# Patient Record
Sex: MALE | Race: WHITE | Employment: OTHER | URBAN - METROPOLITAN AREA
[De-identification: names, ages, dates, MRNs, and addresses within clinical notes are randomized per-mention and may not be internally consistent; named-entity substitution may affect disease eponyms.]

---

## 2017-06-02 ENCOUNTER — TRANSFERRED RECORDS (OUTPATIENT)
Dept: HEALTH INFORMATION MANAGEMENT | Facility: CLINIC | Age: 57
End: 2017-06-02

## 2017-10-30 ENCOUNTER — TRANSFERRED RECORDS (OUTPATIENT)
Dept: HEALTH INFORMATION MANAGEMENT | Facility: CLINIC | Age: 57
End: 2017-10-30

## 2017-11-29 ENCOUNTER — TRANSFERRED RECORDS (OUTPATIENT)
Dept: HEALTH INFORMATION MANAGEMENT | Facility: CLINIC | Age: 57
End: 2017-11-29

## 2017-12-05 ENCOUNTER — TRANSFERRED RECORDS (OUTPATIENT)
Dept: HEALTH INFORMATION MANAGEMENT | Facility: CLINIC | Age: 57
End: 2017-12-05

## 2018-01-30 ENCOUNTER — TRANSFERRED RECORDS (OUTPATIENT)
Dept: HEALTH INFORMATION MANAGEMENT | Facility: CLINIC | Age: 58
End: 2018-01-30

## 2018-02-27 ENCOUNTER — TRANSFERRED RECORDS (OUTPATIENT)
Dept: HEALTH INFORMATION MANAGEMENT | Facility: CLINIC | Age: 58
End: 2018-02-27

## 2018-02-28 ENCOUNTER — TRANSFERRED RECORDS (OUTPATIENT)
Dept: HEALTH INFORMATION MANAGEMENT | Facility: CLINIC | Age: 58
End: 2018-02-28

## 2018-03-12 ENCOUNTER — TRANSFERRED RECORDS (OUTPATIENT)
Dept: HEALTH INFORMATION MANAGEMENT | Facility: CLINIC | Age: 58
End: 2018-03-12

## 2018-03-22 ENCOUNTER — TRANSFERRED RECORDS (OUTPATIENT)
Dept: HEALTH INFORMATION MANAGEMENT | Facility: CLINIC | Age: 58
End: 2018-03-22

## 2018-04-23 ENCOUNTER — TRANSFERRED RECORDS (OUTPATIENT)
Dept: HEALTH INFORMATION MANAGEMENT | Facility: CLINIC | Age: 58
End: 2018-04-23

## 2018-05-18 ENCOUNTER — TRANSFERRED RECORDS (OUTPATIENT)
Dept: HEALTH INFORMATION MANAGEMENT | Facility: CLINIC | Age: 58
End: 2018-05-18

## 2018-05-21 ENCOUNTER — TRANSFERRED RECORDS (OUTPATIENT)
Dept: HEALTH INFORMATION MANAGEMENT | Facility: CLINIC | Age: 58
End: 2018-05-21

## 2018-05-23 ENCOUNTER — TRANSFERRED RECORDS (OUTPATIENT)
Dept: HEALTH INFORMATION MANAGEMENT | Facility: CLINIC | Age: 58
End: 2018-05-23

## 2018-08-03 ENCOUNTER — TRANSFERRED RECORDS (OUTPATIENT)
Dept: HEALTH INFORMATION MANAGEMENT | Facility: CLINIC | Age: 58
End: 2018-08-03

## 2018-08-10 ENCOUNTER — TRANSFERRED RECORDS (OUTPATIENT)
Dept: HEALTH INFORMATION MANAGEMENT | Facility: CLINIC | Age: 58
End: 2018-08-10

## 2018-08-16 ENCOUNTER — TRANSFERRED RECORDS (OUTPATIENT)
Dept: HEALTH INFORMATION MANAGEMENT | Facility: CLINIC | Age: 58
End: 2018-08-16

## 2018-08-17 ENCOUNTER — TRANSFERRED RECORDS (OUTPATIENT)
Dept: HEALTH INFORMATION MANAGEMENT | Facility: CLINIC | Age: 58
End: 2018-08-17

## 2018-08-20 ENCOUNTER — TRANSFERRED RECORDS (OUTPATIENT)
Dept: HEALTH INFORMATION MANAGEMENT | Facility: CLINIC | Age: 58
End: 2018-08-20

## 2018-08-22 ENCOUNTER — TRANSFERRED RECORDS (OUTPATIENT)
Dept: HEALTH INFORMATION MANAGEMENT | Facility: CLINIC | Age: 58
End: 2018-08-22

## 2018-08-28 ENCOUNTER — PRE VISIT (OUTPATIENT)
Dept: ORTHOPEDICS | Facility: CLINIC | Age: 58
End: 2018-08-28

## 2018-08-28 NOTE — TELEPHONE ENCOUNTER
RECORDS RECEIVED FROM:[facility]   DATE RECEIVED: 08/29/2018   NOTES STATUS DETAILS   OFFICE NOTE from referring provider Received    OFFICE NOTE from other specialist Received    DISCHARGE SUMMARY from hospital N/A    DISCHARGE REPORT from the ER N/A    OPERATIVE REPORT Received    MEDICATION LIST Received    IMPLANT RECORD/STICKER N/A    LABS     CBC/DIFF Received    CULTURES N/A    INJECTIONS DONE IN RADIOLOGY Received    MRI Received    CT SCAN received    XRAYS (IMAGES & REPORTS) N/A    TUMOR     PATHOLOGY  Slides & report N/A

## 2018-10-11 DIAGNOSIS — M54.6 PAIN IN THORACIC SPINE: Primary | ICD-10-CM

## 2018-10-12 ENCOUNTER — RADIANT APPOINTMENT (OUTPATIENT)
Dept: GENERAL RADIOLOGY | Facility: CLINIC | Age: 58
End: 2018-10-12
Attending: ORTHOPAEDIC SURGERY
Payer: OTHER MISCELLANEOUS

## 2018-10-12 ENCOUNTER — OFFICE VISIT (OUTPATIENT)
Dept: ORTHOPEDICS | Facility: CLINIC | Age: 58
End: 2018-10-12
Payer: OTHER MISCELLANEOUS

## 2018-10-12 VITALS — WEIGHT: 275.2 LBS | HEIGHT: 70 IN | BODY MASS INDEX: 39.4 KG/M2

## 2018-10-12 DIAGNOSIS — Z13.228 SCREENING FOR ENDOCRINE, NUTRITIONAL, METABOLIC AND IMMUNITY DISORDER: ICD-10-CM

## 2018-10-12 DIAGNOSIS — E66.01 CLASS 3 SEVERE OBESITY DUE TO EXCESS CALORIES WITH SERIOUS COMORBIDITY AND BODY MASS INDEX (BMI) OF 40.0 TO 44.9 IN ADULT (H): ICD-10-CM

## 2018-10-12 DIAGNOSIS — Z13.29 SCREENING FOR ENDOCRINE, NUTRITIONAL, METABOLIC AND IMMUNITY DISORDER: ICD-10-CM

## 2018-10-12 DIAGNOSIS — Z13.21 SCREENING FOR ENDOCRINE, NUTRITIONAL, METABOLIC AND IMMUNITY DISORDER: ICD-10-CM

## 2018-10-12 DIAGNOSIS — E66.813 CLASS 3 SEVERE OBESITY DUE TO EXCESS CALORIES WITH SERIOUS COMORBIDITY AND BODY MASS INDEX (BMI) OF 40.0 TO 44.9 IN ADULT (H): ICD-10-CM

## 2018-10-12 DIAGNOSIS — M54.16 LUMBAR RADICULOPATHY: ICD-10-CM

## 2018-10-12 DIAGNOSIS — Z13.0 SCREENING FOR ENDOCRINE, NUTRITIONAL, METABOLIC AND IMMUNITY DISORDER: ICD-10-CM

## 2018-10-12 DIAGNOSIS — M54.6 PAIN IN THORACIC SPINE: ICD-10-CM

## 2018-10-12 DIAGNOSIS — M85.9 LOW BONE DENSITY: Primary | ICD-10-CM

## 2018-10-12 LAB
ALBUMIN SERPL-MCNC: 3.4 G/DL (ref 3.4–5)
HBA1C MFR BLD: 6.2 % (ref 0–5.6)
MRSA DNA SPEC QL NAA+PROBE: NEGATIVE
SPECIMEN SOURCE: NORMAL

## 2018-10-12 RX ORDER — GABAPENTIN 300 MG/1
800 TABLET, FILM COATED ORAL 4 TIMES DAILY
COMMUNITY
End: 2019-01-11

## 2018-10-12 RX ORDER — AMOXICILLIN 250 MG
1 CAPSULE ORAL 4 TIMES DAILY PRN
COMMUNITY

## 2018-10-12 RX ORDER — PROMETHAZINE HYDROCHLORIDE 25 MG/1
25 TABLET ORAL EVERY MORNING
COMMUNITY

## 2018-10-12 ASSESSMENT — ENCOUNTER SYMPTOMS
POLYDIPSIA: 1
JAUNDICE: 0
DOUBLE VISION: 0
HEARTBURN: 1
BLOATING: 0
EYE PAIN: 0
NAUSEA: 1
FATIGUE: 1
MUSCLE WEAKNESS: 1
MYALGIAS: 1
LOSS OF CONSCIOUSNESS: 0
CHILLS: 0
ABDOMINAL PAIN: 0
CONSTIPATION: 0
PANIC: 0
ALTERED TEMPERATURE REGULATION: 1
INCREASED ENERGY: 1
EYE IRRITATION: 0
WEIGHT GAIN: 1
EYE REDNESS: 0
SEIZURES: 0
MEMORY LOSS: 0
DECREASED CONCENTRATION: 1
POLYPHAGIA: 1
SPEECH CHANGE: 0
TINGLING: 1
ARTHRALGIAS: 0
DISTURBANCES IN COORDINATION: 0
DIZZINESS: 0
BACK PAIN: 1
INSOMNIA: 1
NERVOUS/ANXIOUS: 1
DECREASED APPETITE: 0
HALLUCINATIONS: 0
PARALYSIS: 0
DIARRHEA: 1
HEADACHES: 1
DEPRESSION: 1
NIGHT SWEATS: 0
NUMBNESS: 1
JOINT SWELLING: 0
FEVER: 0
VOMITING: 0
BOWEL INCONTINENCE: 0
NECK PAIN: 0
TREMORS: 0
BLOOD IN STOOL: 0
WEAKNESS: 1
RECTAL PAIN: 0
MUSCLE CRAMPS: 1
STIFFNESS: 1
WEIGHT LOSS: 0

## 2018-10-12 ASSESSMENT — PATIENT HEALTH QUESTIONNAIRE - PHQ9
SUM OF ALL RESPONSES TO PHQ QUESTIONS 1-9: 7
10. IF YOU CHECKED OFF ANY PROBLEMS, HOW DIFFICULT HAVE THESE PROBLEMS MADE IT FOR YOU TO DO YOUR WORK, TAKE CARE OF THINGS AT HOME, OR GET ALONG WITH OTHER PEOPLE: NOT DIFFICULT AT ALL
SUM OF ALL RESPONSES TO PHQ QUESTIONS 1-9: 7

## 2018-10-12 NOTE — NURSING NOTE
"Reason For Visit:   Chief Complaint   Patient presents with     Consult     talk about thoracic spine revision, patient stated there is a screw stuck in the nerve.        Primary MD: Sarah Daly  Ref. MD: Self    ?  No  Occupation disability.  Currently working? No.  Work status?  On disability.  Date of injury: may 1999  Type of injury: acute bent over picking up a case of bleach   Date of surgery: February 27 2018,   Type of surgery: L3- T10. Then screw removal recently   Smoker: Yes  Request smoking cessation information: No    Ht 1.765 m (5' 9.5\")  Wt 124.8 kg (275 lb 3.2 oz)  BMI 40.06 kg/m2    Pain Assessment  Patient Currently in Pain: Yes  0-10 Pain Scale: 9  Primary Pain Location: Back  Pain Orientation: Lower, Right  Pain Descriptors: Throbbing, Aching  Aggravating Factors: Stairs (going to the restroom, twisting)    Oswestry (ANNITA) Questionnaire    OSWESTRY DISABILITY INDEX 10/12/2018   Count 9   Sum 16   Oswestry Score (%) 35.56   Some recent data might be hidden            Neck Disability Index (NDI) Questionnaire    No flowsheet data found.           Visual Analog Pain Scale  Back Pain Scale 0-10: 7.5  Right leg pain: 6  Left leg pain: 3    Promis 10 Assessment    PROMIS 10 10/12/2018   In general, would you say your health is: Good   In general, would you say your quality of life is: Good   In general, how would you rate your physical health? Good   In general, how would you rate your mental health, including your mood and your ability to think? Good   In general, how would you rate your satisfaction with your social activities and relationships? Fair   In general, please rate how well you carry out your usual social activities and roles Good   To what extent are you able to carry out your everyday physical activities such as walking, climbing stairs, carrying groceries, or moving a chair? Moderately   How often have you been bothered by emotional problems such as feeling anxious, " depressed or irritable? Sometimes   How would you rate your fatigue on average? Moderate   How would you rate your pain on average?   0 = No Pain  to  10 = Worst Imaginable Pain 6   In general, would you say your health is: 3   In general, would you say your quality of life is: 3   In general, how would you rate your physical health? 3   In general, how would you rate your mental health, including your mood and your ability to think? 3   In general, how would you rate your satisfaction with your social activities and relationships? 2   In general, please rate how well you carry out your usual social activities and roles. (This includes activities at home, at work and in your community, and responsibilities as a parent, child, spouse, employee, friend, etc.) 3   To what extent are you able to carry out your everyday physical activities such as walking, climbing stairs, carrying groceries, or moving a chair? 3   In the past 7 days, how often have you been bothered by emotional problems such as feeling anxious, depressed, or irritable? 3   In the past 7 days, how would you rate your fatigue on average? 3   In the past 7 days, how would you rate your pain on average, where 0 means no pain, and 10 means worst imaginable pain? 6   Global Mental Health Score 11   Global Physical Health Score 12   PROMIS TOTAL - SUBSCORES 23   Some recent data might be hidden                Kali Ortiz ATC

## 2018-10-12 NOTE — NURSING NOTE
John lives in [facility], and the following referrals were printed out for him to do locally: CT myelograms Thoracic and Lumbar, Dexa, P.T./Pool therapy, Lumbar BRITTNY, Nutritionist.  He will get those done, hand carry a disk of images as backup, and return to see Dr Sebastian after the above same day as PAC for anesthesia workup.  MRSA screen swab was sent to lab, and pt will have endocrine screen labs drawn today.  Pt presently takes 5000u Vit D/day.  Namita Flowers RN

## 2018-10-12 NOTE — LETTER
10/12/2018       RE: John Olvera  [address] [address]     Dear Colleague,    Thank you for referring your patient, John Olvera, to [facility]. Please see a copy of my visit note below.    Spine Surgery Consultation    REFERRING PHYSICIAN: Justin Cisse   PRIMARY CARE PHYSICIAN: Sarah Daly           Chief Complaint:   Consult (talk about thoracic spine revision, patient stated there is a screw stuck in the nerve. )      History of Present Illness:  Symptom Profile Including: location of symptoms, onset, severity, exacerbating/alleviating factors, previous treatments:        John Olvera is a 58 year old male who is referred to me by Dr. Zuhair Cisse in [place] for evaluation of lumbar flat back with a very complicated past procedure.  He has had multiple previous lumbar operations.  Originally an L3 to the S1 level which was subsequently complicated by adjacent segment fracture which was treated with vertebroplasty.  He went on to have continuing pain and subsequently then in February 2018 had an extension of the previous fusion up to the T10 level.  Unfortunately this most recent procedure was complicated in multiple ways.  He did develop a deep infection and had 6 weeks of IV antibiotics.  Furthermore he had missed placed screws and he did have to return to the operating room for screw removal and ever since that time he has had severe right thoracic rib cage radicular complaints.    On interview today he is bothered by 2 things.  First the right thoracic rib cage pain is never gotten better.  He says it was present as soon as he woke up after the February surgery.  In addition he cannot stand up straight.  He feels like he bends forward when he walks.  He has severe low back pain from this.  He denies any radicular complaints into the legs.    He is tried multiple oral medications including gabapentin anti-inflammatories  "and is been doing some physical therapy.  He thinks he is gained 30-40 pounds since the surgery because of inactivity.  He has started smoking again.         Past Medical History:   No past medical history on file.         Past Surgical History:   No past surgical history on file.         Social History:     Social History   Substance Use Topics     Smoking status: Not on file     Smokeless tobacco: Not on file     Alcohol use Not on file            Family History:   No family history on file.         Allergies:   Not on File         Medications:     Current Outpatient Prescriptions   Medication     tiZANidine (ZANAFLEX) 4 MG tablet     No current facility-administered medications for this visit.              Review of Systems:     A 10 point ROS was performed and reviewed. Specific responses to these questions are noted at the end of the document.         Physical Exam:   Vitals: Ht 1.765 m (5' 9.5\")  Wt 124.8 kg (275 lb 3.2 oz)  BMI 40.06 kg/m2  Constitutional: awake, alert, cooperative, no apparent distress, appears stated age.    Eyes: The sclera are white.  Ears, Nose, Throat: The trachea is midline.  Psychiatric: The patient has a normal affect.  Respiratory: breathing non-labored  Cardiovascular: The extremities are warm and perfused.  Skin: no obvious rashes or lesions.  Musculoskeletal, Neurologic, and Spine:      Lumbar Spine:    Appearance - No gross stepoffs or deformities    Motor -     L2-3: Hip flexion 5/5 L and 5/5 R strength          L3/4:  Knee extension L 5/5 and R 5/5 strength         L4/5:  Foot dorsiflexion R 5/5 L 5/5 and       EHL dorsiflexion R 4/5 L 4/5 strength         S1:  Plantarflexion/Peroneal Muscles  L 5/5 and R 5/5 strength    Sensation: intact to light touch L3-S1 distribution BLE      Neurologic:      REFLEXES Left Right                  Patella 1+ 1+   Ankle jerk 1+ 1+   Babinski No upgoing great toe No upgoing great toe   Clonus 0 beats 0 beats     Hip Exam:  No pain with hip " log roll and no tenderness over the greater trochanters.    Alignment:  Patient stands with a neutral standing sagittal balance.           Imaging:   We ordered and independently reviewed new radiographs at this clinic visit. The results were discussed with the patient.  Findings include:    October 12, 2018 AP lateral scoliosis radiographs show evidence of previous extensive thoracolumbar fusion from T10-S1 with a multi-luis angel construct and evidence of fracture at L2 with cement augmentation.  Severe sagittal imbalance with 16 cm positive based on the C7 soraya line.  Lumbar lordosis measures 26 degrees, pelvic incidence measures 40 degrees.  Thoracic kyphosis measures 54 degrees.    August 9, 2018 thoracic level MRI shows no obvious severe stenosis.    August 9, 2018 thoracic and lumbar CT scans: Evidence of previous medially placed screws which are subsequently removed on the right at T12 and L2.  There is also a medial breach at L1 with residual screw at that level which was not removed.  Solid-appearing fusion from L3-S1 but indeterminant fusion above that.         Assessment and Plan:   Assessment:  58 year old male with a right medial screw breach at L1 as well as lumbar flat back after multiply complicated previous lumbar surgeries.     Plan:  1. I do not know for sure if the right L1 screw is the cause of his continued right-sided pain.  To help sort this out I would like him to get an L1 to transforaminal epidural steroid injection.  2. I am also not sure if he could tolerate a major thoracolumbar reconstruction at this point.  If we were to address his problems he would likely need a pedicle subtraction osteotomy.  Certainly it would be very high risk in him.  I think he needs to quit smoking and lose weight before he would be a candidate.  I am going to order a bone density study and anesthesia screening visit for him.  I also sent him for aquatic-based physical therapy.  3. Follow-up with me in 6-8 weeks so  I can assess his response to the injection and follow his progress with weight loss and therapy as well as smoking cessation.      Again, thank you for allowing me to participate in the care of your patient.      Sincerely,    Salazar Sebastian MD

## 2018-10-12 NOTE — PROGRESS NOTES
Spine Surgery Consultation    REFERRING PHYSICIAN: Justin Cisse   PRIMARY CARE PHYSICIAN: Sarah Daly           Chief Complaint:   Consult (talk about thoracic spine revision, patient stated there is a screw stuck in the nerve. )      History of Present Illness:  Symptom Profile Including: location of symptoms, onset, severity, exacerbating/alleviating factors, previous treatments:        John Olvera is a 58 year old male who is referred to me by Dr. Zuhair Cisse in [place] for evaluation of lumbar flat back with a very complicated past procedure.  He has had multiple previous lumbar operations.  Originally an L3 to the S1 level which was subsequently complicated by adjacent segment fracture which was treated with vertebroplasty.  He went on to have continuing pain and subsequently then in February 2018 had an extension of the previous fusion up to the T10 level.  Unfortunately this most recent procedure was complicated in multiple ways.  He did develop a deep infection and had 6 weeks of IV antibiotics.  Furthermore he had missed placed screws and he did have to return to the operating room for screw removal and ever since that time he has had severe right thoracic rib cage radicular complaints.    On interview today he is bothered by 2 things.  First the right thoracic rib cage pain is never gotten better.  He says it was present as soon as he woke up after the February surgery.  In addition he cannot stand up straight.  He feels like he bends forward when he walks.  He has severe low back pain from this.  He denies any radicular complaints into the legs.    He is tried multiple oral medications including gabapentin anti-inflammatories and is been doing some physical therapy.  He thinks he is gained 30-40 pounds since the surgery because of inactivity.  He has started smoking again.         Past Medical History:   No past medical history on file.         Past Surgical History:   No past surgical history  "on file.         Social History:     Social History   Substance Use Topics     Smoking status: Not on file     Smokeless tobacco: Not on file     Alcohol use Not on file            Family History:   No family history on file.         Allergies:   Not on File         Medications:     Current Outpatient Prescriptions   Medication     tiZANidine (ZANAFLEX) 4 MG tablet     No current facility-administered medications for this visit.              Review of Systems:     A 10 point ROS was performed and reviewed. Specific responses to these questions are noted at the end of the document.         Physical Exam:   Vitals: Ht 1.765 m (5' 9.5\")  Wt 124.8 kg (275 lb 3.2 oz)  BMI 40.06 kg/m2  Constitutional: awake, alert, cooperative, no apparent distress, appears stated age.    Eyes: The sclera are white.  Ears, Nose, Throat: The trachea is midline.  Psychiatric: The patient has a normal affect.  Respiratory: breathing non-labored  Cardiovascular: The extremities are warm and perfused.  Skin: no obvious rashes or lesions.  Musculoskeletal, Neurologic, and Spine:      Lumbar Spine:    Appearance - No gross stepoffs or deformities    Motor -     L2-3: Hip flexion 5/5 L and 5/5 R strength          L3/4:  Knee extension L 5/5 and R 5/5 strength         L4/5:  Foot dorsiflexion R 5/5 L 5/5 and       EHL dorsiflexion R 4/5 L 4/5 strength         S1:  Plantarflexion/Peroneal Muscles  L 5/5 and R 5/5 strength    Sensation: intact to light touch L3-S1 distribution BLE      Neurologic:      REFLEXES Left Right                  Patella 1+ 1+   Ankle jerk 1+ 1+   Babinski No upgoing great toe No upgoing great toe   Clonus 0 beats 0 beats     Hip Exam:  No pain with hip log roll and no tenderness over the greater trochanters.    Alignment:  Patient stands with a neutral standing sagittal balance.           Imaging:   We ordered and independently reviewed new radiographs at this clinic visit. The results were discussed with the patient.  " Findings include:    October 12, 2018 AP lateral scoliosis radiographs show evidence of previous extensive thoracolumbar fusion from T10-S1 with a multi-luis angel construct and evidence of fracture at L2 with cement augmentation.  Severe sagittal imbalance with 16 cm positive based on the C7 soraya line.  Lumbar lordosis measures 26 degrees, pelvic incidence measures 40 degrees.  Thoracic kyphosis measures 54 degrees.    August 9, 2018 thoracic level MRI shows no obvious severe stenosis.    August 9, 2018 thoracic and lumbar CT scans: Evidence of previous medially placed screws which are subsequently removed on the right at T12 and L2.  There is also a medial breach at L1 with residual screw at that level which was not removed.  Solid-appearing fusion from L3-S1 but indeterminant fusion above that.         Assessment and Plan:   Assessment:  58 year old male with a right medial screw breach at L1 as well as lumbar flat back after multiply complicated previous lumbar surgeries.     Plan:  1. I do not know for sure if the right L1 screw is the cause of his continued right-sided pain.  To help sort this out I would like him to get an L1 to transforaminal epidural steroid injection.  2. I am also not sure if he could tolerate a major thoracolumbar reconstruction at this point.  If we were to address his problems he would likely need a pedicle subtraction osteotomy.  Certainly it would be very high risk in him.  I think he needs to quit smoking and lose weight before he would be a candidate.  I am going to order a bone density study and anesthesia screening visit for him.  I also sent him for aquatic-based physical therapy.  3. Follow-up with me in 6-8 weeks so I can assess his response to the injection and follow his progress with weight loss and therapy as well as smoking cessation.      Respectfully,  Salazar Sebastian MD  Spine Surgery  [facility]      Answers for HPI/ROS submitted by the patient on  10/12/2018   General Symptoms: Yes  Skin Symptoms: No  HENT Symptoms: No  EYE SYMPTOMS: Yes  HEART SYMPTOMS: No  LUNG SYMPTOMS: No  INTESTINAL SYMPTOMS: Yes  URINARY SYMPTOMS: No  REPRODUCTIVE SYMPTOMS: No  SKELETAL SYMPTOMS: Yes  BLOOD SYMPTOMS: No  NERVOUS SYSTEM SYMPTOMS: Yes  MENTAL HEALTH SYMPTOMS: Yes  Fever: No  Loss of appetite: No  Weight loss: No  Weight gain: Yes  Fatigue: Yes  Night sweats: No  Chills: No  Increased stress: Yes  Excessive hunger: Yes  Excessive thirst: Yes  Feeling hot or cold when others believe the temperature is normal: Yes  Loss of height: No  Post-operative complications: No  Surgical site pain: Yes  Hallucinations: No  Change in or Loss of Energy: Yes  Hyperactivity: No  Confusion: No  Eye pain: No  Vision loss: Yes  Dry eyes: No  Eye bulging: No  Double vision: No  Flashing of lights: No  Spots: No  Floaters: No  Redness: No  Crossed eyes: No  Tunnel Vision: No  Yellowing of eyes: No  Eye irritation: No  Heart burn or indigestion: Yes  Nausea: Yes  Vomiting: No  Abdominal pain: No  Bloating: No  Constipation: No  Diarrhea: Yes  Blood in stool: No  Black stools: No  Rectal or Anal pain: No  Fecal incontinence: No  Yellowing of skin or eyes: No  Vomit with blood: No  Change in stools: No  Back pain: Yes  Muscle aches: Yes  Neck pain: No  Swollen joints: No  Joint pain: No  Bone pain: No  Muscle cramps: Yes  Muscle weakness: Yes  Joint stiffness: Yes  Bone fracture: No  Trouble with coordination: No  Dizziness or trouble with balance: No  Fainting or black-out spells: No  Memory loss: No  Headache: Yes  Seizures: No  Speech problems: No  Tingling: Yes  Tremor: No  Weakness: Yes  Difficulty walking: Yes  Paralysis: No  Numbness: Yes  Nervous or Anxious: Yes  Depression: Yes  Trouble sleeping: Yes  Trouble thinking or concentrating: Yes  Mood changes: Yes  Panic attacks: No  PHQ-2 Score: 4  If you checked off any problems, how difficult have these problems made it for you to do your  work, take care of things at home, or get along with other people?: Not difficult at all  PHQ9 TOTAL SCORE: 7

## 2018-10-12 NOTE — MR AVS SNAPSHOT
After Visit Summary   10/12/2018    John Olvera    MRN: [id number]           Patient Information     Date Of Birth          1960        Visit Information        Provider Department      10/12/2018 11:20 AM Salazar Sebastian [facility]        Today's Diagnoses     Low bone density    -  1    Screening for endocrine, nutritional, metabolic and immunity disorder        Lumbar radiculopathy        Class 3 severe obesity due to excess calories with serious comorbidity and body mass index (BMI) of 40.0 to 44.9 in adult (H)           Follow-ups after your visit        Additional Services     NUTRITION REFERRAL       Your provider has referred you to: OTHER PROVIDERS: Weight loss management with nutritionist    Please be aware that coverage of these services is subject to the terms and limitations of your health insurance plan.  Call member services at your health plan with any benefit or coverage questions.      Please bring the following with you to your appointment:    (1) This referral request  (2) Any documents given to you regarding this referral  (3) Any specific questions you have about diet and/or food choices            PAC Visit Referral (For [facility] Only)       Does this visit require an Anesthesia consult?  Yes - Evaluate for medical necessity related to one of the following conditions:  Morbid Obesity and opioid tolerance    H&P done by:  N/A      Please be aware that coverage of these services is subject to the terms and limitations of your health insurance plan.  Call member services at your health plan with any benefit or coverage questions.      Please bring the following to your appointment:  >>   Any x-rays, CTs or MRIs which have been performed.  Contact the facility where they were done to arrange for  prior to your scheduled appointment.  Any new CT, MRI or other procedures ordered by your specialist must be performed at a [facility] or  coordinated by your clinic's referral office.    >>   List of current medications  >>   This referral request   >>   Any documents/labs given to you for this referral            PHYSICAL THERAPY REFERRAL (External-Prints)       Physical Therapy Referral                  Future tests that were ordered for you today     Open Future Orders        Priority Expected Expires Ordered    X-ray Transforaminal lumbar single inj Routine 10/12/2018 10/12/2019 10/12/2018    PHYSICAL THERAPY REFERRAL (External-Prints) Routine  10/12/2019 10/12/2018    XR Myelogram Thoracic Spine Routine 10/12/2018 10/12/2019 10/12/2018    XR Myelography Lumbar Spine Routine 10/12/2018 10/12/2019 10/12/2018    CT Thoracic spine w/o contrast Routine  10/12/2019 10/12/2018    CT lumbar spine without contrast Routine  10/12/2019 10/12/2018    DX Hip/Pelvis/Spine Routine  10/12/2019 10/12/2018            Who to contact     Please call your clinic at [phone] to:    Ask questions about your health    Make or cancel appointments    Discuss your medicines    Learn about your test results    Speak to your doctor            Additional Information About Your Visit        [employer] Information     [employer] gives you secure access to your electronic health record. If you see a primary care provider, you can also send messages to your care team and make appointments. If you have questions, please call your primary care clinic.  If you do not have a primary care provider, please call [phone] and they will assist you.      [employer] is an electronic gateway that provides easy, online access to your medical records. With [employer], you can request a clinic appointment, read your test results, renew a prescription or communicate with your care team.     To access your existing account, please contact your [facility] or call [phone] for assistance.        Care EveryWhere ID     This is your Care EveryWhere ID. This could be  "used by other organizations to access your [facility] medical records  [id number]        Your Vitals Were     Height BMI (Body Mass Index)                1.765 m (5' 9.5\") 40.06 kg/m2           Blood Pressure from Last 3 Encounters:   No data found for BP    Weight from Last 3 Encounters:   10/12/18 124.8 kg (275 lb 3.2 oz)              We Performed the Following     NUTRITION REFERRAL     PAC Visit Referral (For [facility] Only)       Information about OPIOIDS     PRESCRIPTION OPIOIDS: WHAT YOU NEED TO KNOW   We gave you an opioid (narcotic) pain medicine. It is important to manage your pain, but opioids are not always the best choice. You should first try all the other options your care team gave you. Take this medicine for as short a time (and as few doses) as possible.    Some activities can increase your pain, such as bandage changes or therapy sessions. It may help to take your pain medicine 30 to 60 minutes before these activities. Reduce your stress by getting enough sleep, working on hobbies you enjoy and practicing relaxation or meditation. Talk to your care team about ways to manage your pain beyond prescription opioids.    These medicines have risks:    DO NOT drive when on new or higher doses of pain medicine. These medicines can affect your alertness and reaction times, and you could be arrested for driving under the influence (DUI). If you need to use opioids long-term, talk to your care team about driving.    DO NOT operate heavy machinery    DO NOT do any other dangerous activities while taking these medicines.    DO NOT drink any alcohol while taking these medicines.     If the opioid prescribed includes acetaminophen, DO NOT take with any other medicines that contain acetaminophen. Read all labels carefully. Look for the word  acetaminophen  or  Tylenol.  Ask your pharmacist if you have questions or are unsure.    You can get addicted to pain medicines, especially if you have a history of addiction " (chemical, alcohol or substance dependence). Talk to your care team about ways to reduce this risk.    All opioids tend to cause constipation. Drink plenty of water and eat foods that have a lot of fiber, such as fruits, vegetables, prune juice, apple juice and high-fiber cereal. Take a laxative (Miralax, milk of magnesia, Colace, Senna) if you don t move your bowels at least every other day. Other side effects include upset stomach, sleepiness, dizziness, throwing up, tolerance (needing more of the medicine to have the same effect), physical dependence and slowed breathing.    Store your pills in a secure place, locked if possible. We will not replace any lost or stolen medicine. If you don t finish your medicine, please throw away (dispose) as directed by your pharmacist. The [place] [employer]ution [employer] has more information about safe disposal: https://www.pca.[facility].[place].us/living-green/managing-unwanted-medications         Primary Care Provider Office Phone # Fax #    Sarah Daly [phone]       Aayush Guzmán [facility] [address] [place] [address]        Equal Access to Services     [facility]: Hadii keyon ku hadasho Soomaali, waaxda luqadaha, qaybta kaalmada adelulyada, kristopher méndez . So [facility] [phone].    ATENCIÓN: Si habla español, tiene a greer disposición servicios gratuitos de asistencia lingüística. Arlen al [phone].    We comply with applicable federal civil rights laws and [place] laws. We do not discriminate on the basis of race, color, national origin, age, disability, sex, sexual orientation, or gender identity.            Thank you!     Thank you for choosing [facility]  for your care. Our goal is always to provide you with excellent care. Hearing back from our patients is one way we can continue to improve our services. Please take a few minutes to complete the written survey that you may receive in the mail  after your visit with us. Thank you!             Your Updated Medication List - Protect others around you: Learn how to safely use, store and throw away your medicines at www.disposemymeds.org.          This list is accurate as of 10/12/18  1:53 PM.  Always use your most recent med list.                   Brand Name Dispense Instructions for use Diagnosis    * AMBIEN PO           * AMBIEN CR PO      Take 12.5 mg by mouth At Bedtime        BUSPAR PO      Take 15 mg by mouth daily (with dinner)        DILAUDID PO      Take 8 mg by mouth 4 times daily        gabapentin 300 MG 24 hr tablet    GRALISE     Take 800 mg by mouth 4 times daily        LEXAPRO PO      Take 20 mg by mouth daily        OMEPRAZOLE PO      Take 20 mg by mouth every morning        promethazine 25 MG tablet    PHENERGAN     Take 25 mg by mouth every morning        senna-docusate 8.6-50 MG per tablet    SENOKOT-S;PERICOLACE     Take 1 tablet by mouth 4 times daily as needed for constipation        tiZANidine 4 MG tablet    ZANAFLEX     8 mg 2 times daily        TRAZODONE HCL PO      Take 150 mg by mouth At Bedtime        VITAMIN D (CHOLECALCIFEROL) PO      Take 5,000 Units by mouth daily        * Notice:  This list has 2 medication(s) that are the same as other medications prescribed for you. Read the directions carefully, and ask your doctor or other care provider to review them with you.

## 2018-10-13 LAB — DEPRECATED CALCIDIOL+CALCIFEROL SERPL-MC: 47 UG/L (ref 20–75)

## 2018-10-13 ASSESSMENT — PATIENT HEALTH QUESTIONNAIRE - PHQ9: SUM OF ALL RESPONSES TO PHQ QUESTIONS 1-9: 7

## 2018-10-15 ENCOUNTER — TELEPHONE (OUTPATIENT)
Dept: ORTHOPEDICS | Facility: CLINIC | Age: 58
End: 2018-10-15

## 2018-10-15 NOTE — TELEPHONE ENCOUNTER
John was seen with Dr Sebastian 10/12/18 for his thoracic and lumbar spine, work comp.    Orders for Dexa, Thoracic and Lumbar CT myelograms, Epidural steroid injection.  Rebecca from [facility] phoned to state all orders need work comp approval from our clinic.    [facility] [employer]  Phone [phone], fax [phone].  Forms were filled out for pt's above tests and injection and sent along with clinic note and above orders to  for follow up.    Namita Flowers RN

## 2018-10-17 ENCOUNTER — DOCUMENTATION ONLY (OUTPATIENT)
Dept: ORTHOPEDICS | Facility: CLINIC | Age: 58
End: 2018-10-17

## 2018-10-18 NOTE — TELEPHONE ENCOUNTER
[facility] Call Center    Phone Message    May a detailed message be left on voicemail: yes    Reason for Call: Other: Needs clarification on pre certification form. Please call number provided to discuss.     Action Taken: Message routed to:  [facility] ([facility]): Orthopedics

## 2018-11-27 ENCOUNTER — TRANSFERRED RECORDS (OUTPATIENT)
Dept: HEALTH INFORMATION MANAGEMENT | Facility: CLINIC | Age: 58
End: 2018-11-27

## 2018-12-23 ENCOUNTER — MYC MEDICAL ADVICE (OUTPATIENT)
Dept: ORTHOPEDICS | Facility: CLINIC | Age: 58
End: 2018-12-23

## 2019-01-01 ASSESSMENT — ENCOUNTER SYMPTOMS
PANIC: 1
SPEECH CHANGE: 0
RECTAL PAIN: 0
MUSCLE WEAKNESS: 1
ABDOMINAL PAIN: 1
NIGHT SWEATS: 1
DISTURBANCES IN COORDINATION: 0
DYSURIA: 0
DEPRESSION: 1
WEIGHT LOSS: 1
CHILLS: 0
HYPOTENSION: 0
EYE PAIN: 0
NECK PAIN: 0
EYE IRRITATION: 0
STIFFNESS: 1
HEMATURIA: 0
INCREASED ENERGY: 1
HEADACHES: 0
HYPERTENSION: 0
PARALYSIS: 0
NAUSEA: 0
TINGLING: 0
POLYPHAGIA: 0
LIGHT-HEADEDNESS: 0
ORTHOPNEA: 0
MEMORY LOSS: 1
PALPITATIONS: 0
EXERCISE INTOLERANCE: 1
DECREASED CONCENTRATION: 1
BACK PAIN: 1
NERVOUS/ANXIOUS: 1
DIZZINESS: 1
HEARTBURN: 0
JOINT SWELLING: 0
POOR WOUND HEALING: 0
POLYDIPSIA: 0
DIARRHEA: 1
SYNCOPE: 0
SKIN CHANGES: 0
MYALGIAS: 1
FLANK PAIN: 0
DECREASED APPETITE: 0
WEAKNESS: 1
DOUBLE VISION: 0
LEG PAIN: 1
ARTHRALGIAS: 0
LOSS OF CONSCIOUSNESS: 0
EYE WATERING: 1
TREMORS: 0
FEVER: 0
INSOMNIA: 1
SLEEP DISTURBANCES DUE TO BREATHING: 0
BLOOD IN STOOL: 0
BOWEL INCONTINENCE: 0
NUMBNESS: 1
VOMITING: 0
SEIZURES: 0
NAIL CHANGES: 0
CONSTIPATION: 1
BLOATING: 0
WEIGHT GAIN: 0
JAUNDICE: 0
EYE REDNESS: 0
MUSCLE CRAMPS: 1
ALTERED TEMPERATURE REGULATION: 1
FATIGUE: 1
DIFFICULTY URINATING: 0
HALLUCINATIONS: 0

## 2019-01-11 ENCOUNTER — OFFICE VISIT (OUTPATIENT)
Dept: SURGERY | Facility: CLINIC | Age: 59
End: 2019-01-11
Payer: OTHER MISCELLANEOUS

## 2019-01-11 ENCOUNTER — OFFICE VISIT (OUTPATIENT)
Dept: ORTHOPEDICS | Facility: CLINIC | Age: 59
End: 2019-01-11
Payer: OTHER MISCELLANEOUS

## 2019-01-11 ENCOUNTER — ANESTHESIA EVENT (OUTPATIENT)
Dept: SURGERY | Facility: CLINIC | Age: 59
End: 2019-01-11

## 2019-01-11 VITALS
HEART RATE: 78 BPM | TEMPERATURE: 98.4 F | DIASTOLIC BLOOD PRESSURE: 76 MMHG | OXYGEN SATURATION: 95 % | BODY MASS INDEX: 37.25 KG/M2 | WEIGHT: 260.2 LBS | SYSTOLIC BLOOD PRESSURE: 131 MMHG | HEIGHT: 70 IN

## 2019-01-11 VITALS — HEIGHT: 69 IN | BODY MASS INDEX: 38.44 KG/M2 | WEIGHT: 259.5 LBS

## 2019-01-11 DIAGNOSIS — M40.30 FLATBACK SYNDROME: Primary | ICD-10-CM

## 2019-01-11 DIAGNOSIS — Z87.891 SMOKING HISTORY: ICD-10-CM

## 2019-01-11 DIAGNOSIS — Z01.818 PREOP EXAMINATION: Primary | ICD-10-CM

## 2019-01-11 DIAGNOSIS — M40.36 FLATBACK SYNDROME OF LUMBAR REGION: ICD-10-CM

## 2019-01-11 DIAGNOSIS — M54.16 LUMBAR RADICULOPATHY: Primary | ICD-10-CM

## 2019-01-11 RX ORDER — GABAPENTIN 800 MG/1
800 TABLET ORAL 4 TIMES DAILY
COMMUNITY

## 2019-01-11 RX ORDER — IBUPROFEN 200 MG
400 TABLET ORAL EVERY 8 HOURS PRN
Status: ON HOLD | COMMUNITY
End: 2019-06-17

## 2019-01-11 ASSESSMENT — LIFESTYLE VARIABLES: TOBACCO_USE: 1

## 2019-01-11 ASSESSMENT — MIFFLIN-ST. JEOR
SCORE: 1992.07
SCORE: 2006.51

## 2019-01-11 NOTE — PHARMACY - PREOPERATIVE ASSESSMENT CENTER
PREOPERATIVE PAIN CONSULT FOR POSTOPERATIVE PAIN MANAGEMENT  John Olvera was seen and interviewed during PAC Clinic appointment on January 11, 2019 in preparation for a possible revision T11-pelvis surgery with pedicle subtraction osteotomy.  Patient is here for an anesthesia visit to discuss risk of  Medical morbidtiy with this operation.  Per discussion with patient he definitely will not have surgery in the next month and likely not for ~4 months, but this is yet to be determined.  Given this will not be within 30 days of the operation we spoke briefly about what he can expect for perioperative pain management, but that we would speak in more specifics if the surgery gets planned and is seen in PAC prior to the operation.      - OUTPATIENT MEDICATIONS (related to pain management):  -- Long-acting opioid: none  -- Short-acting opioid: hydromorphone 8 mg PO QID scheduled  -- Oral adjuvant(s): gabapentin 800 mg PO QID, tizanidine 4 mg PO BID PRN, ibuprofen PRN (uses rarely for headaches)  -- Topicals: none  -- Bowel regimen: senna-docusate 1 tab QID PRN   -- Other relevant medications: escitalopram 20 mg daily, buspar 15 mg daily with supper, trazodone 150 mg PO at bedtime, zolpidem CR 12.5 mg at bedtime     ASSESSMENT:    John Olvera has a history of back injury in 1999 after picking up a box of bleach containers at his job.  He has had 5 previous spinal fusion surgeries most recently about 11 months ago which was complicated by infection as well as ongoing R sided radicular back pain that has not resolved.  He is opioid tolerant, on relatively high doses of opioid analgesic at stable doses. Outpatient opioids prescribed by pain specialist in [facility] Dr. Sal Ramírez.  He has been on daily opioid therapy since about 2000 and he has been on dilaudid for the majority of that time.  He is able to walk and does this every day (2-3 miles/day) in an effort to lose weight.  He has not been able to work since the  original accident and he has a hard time getting to sleep, but sleeps well when he does get to sleep (~6-7 hr).  He denies any sedation with the opioids and he does have constipation but this is well managed with his senna regimen.  He denies any history of NICOLE but does snore and has risk factors for NICOLE.  He rarely (1x/month) drinks any alcohol, he denies any recreational drug abuse, and denies any opioid abuse/misuse/diversion.  He is still smoking 3/4 pack per day and this along with his desire to lose more weight prior to surgery are what is pushing the operation back as he needs to quit smoking before surgery.  He has worked with his pain specialist to reduce doses of his opioids prior to surgery in the past and was open to doing this again.  I suggested he discuss this with his pain specialist at their next visit and devise a plan to attempt to reduce opioid doses as able prior to surgery.  Generally John reports he has not had too many issues with postoperative pain control after his other back surgeries and is open to a multimodal pain management strategy.      Outpatient opioid oral morphine equivalent (OME): 128 mg OME/day    Other pain therapies tried in the past (not an all inclusive list):   PCA - patient has received PCA in the past without complication  Oxycodone - doesn't recall exactly, but doesn't think that this worked that well for him in the past  Morphine - tolerated via PCA in the past  Does not report any adverse effects from opioids in the past.   Dilaudid - has worked the best for his pain, would prefer to stay with this medication perioperatively    Pain therapies never tried (not an all inclusive list):   Ketamine - denies seizures, uncontrolled hypertension, cardiac arrhythmias, PTSD, BPAD, schizophrenia, psychiatric disorders (eg. hallucinations/delirium), history of brain cancer, cardiac failure, previous CVA and increased IOP/glaucoma and is open to using ketamine for pain control.      RECOMMENDATIONS:     - PREOPERATIVE:  - Continue to follow with your pain provider and discuss possibility of reducing opioid doses prior to surgery as you have done in the past.   - Recommend being seen in PAC clinic again once surgery scheduled and when within 30 days of the operation to develop formal plan based on current medication regimen.     Derian Soria RPH  January 11, 2019  1:00 PM

## 2019-01-11 NOTE — LETTER
1/11/2019       RE: John Olvera  [address] [facility] [place] [address]     Dear Colleague,    Thank you for referring your patient, John Olvera, to the [facility]. Please see a copy of my visit note below.    Spine Surgery Return Clinic Visit      Chief Complaint:   Follow Up (Thoracic and lumbar spine, work comp. Thoracic spine revision consult. S/P L3-T10 fusion. Patient has a same day PAC appointment  ) and Results (Dexa, Thoracic and Lumbar CT myelograms, Epidural steroid injection.)      Interval HPI:  Symptom Profile Including: location of symptoms, onset, severity, exacerbating/alleviating factors, previous treatments:        John Olvera is a 58 year old male who is referred to me by Dr. Zuhair Cisse in [place] for evaluation of lumbar flat back with a very complicated past procedure.  He has had multiple previous lumbar operations.  Originally an L3 to the S1 level which was subsequently complicated by adjacent segment fracture which was treated with vertebroplasty.  He went on to have continuing pain and subsequently then in February 2018 had an extension of the previous fusion up to the T10 level.  Unfortunately this most recent procedure was complicated in multiple ways.  He did develop a deep infection and had 6 weeks of IV antibiotics.  Furthermore he had missed placed screws and he did have to return to the operating room for screw removal and ever since that time he has had severe right thoracic rib cage radicular complaints.     On interview today he is bothered by 2 things.  First the right thoracic rib cage pain is never gotten better.  He says it was present as soon as he woke up after the February surgery.  In addition he cannot stand up straight.  He feels like he bends forward when he walks.  He has severe low back pain from this.  He denies any radicular complaints into the legs.     He is tried multiple oral medications  including gabapentin anti-inflammatories and is been doing some physical therapy.  He thinks he is gained 30-40 pounds since the surgery because of inactivity.  He has started smoking again.    I last saw him a couple months ago and sent him for nutrition counseling and smoking cessation.  He has lost 13 pounds but has not quit smoking.  He did have a lumbar epidural injection which was a right-sided transforaminal injection.  I order this at L1 and he is not sure if it was done at that level but he thinks that it was.  He says this helped him for about 12 hours and then the symptoms returned.  He is still quite bothered by the burning nerve type pain.              Past Medical History:     Past Medical History:   Diagnosis Date     Anxiety      Chronic back pain      CKD (chronic kidney disease)      Class 3 severe obesity due to excess calories with serious comorbidity and body mass index (BMI) of 40.0 to 44.9 in adult (H) 10/12/2018     Depression      GERD (gastroesophageal reflux disease)      Lumbar radiculopathy 10/12/2018     Prediabetes             Past Surgical History:     Past Surgical History:   Procedure Laterality Date     BACK SURGERY      laminectomy     BACK SURGERY      fixation kyphoplasty lumbar spine     BACK SURGERY      spinal fusion     COLONOSCOPY       EPIDIDYMECTOMY              Social History:     Social History     Tobacco Use     Smoking status: Former Smoker     Packs/day: 1.00     Years: 40.00     Pack years: 40.00     Last attempt to quit: 2017     Years since quittin.1   Substance Use Topics     Alcohol use: Yes     Comment: 1-2 drinks monthly            Family History:     Family History   Problem Relation Age of Onset     Pancreatic Cancer Father      Bladder Cancer Brother             Allergies:   No Known Allergies         Medications:     Current Outpatient Medications   Medication     BusPIRone HCl (BUSPAR PO)     Escitalopram Oxalate (LEXAPRO PO)      "gabapentin (GRALISE) 300 MG 24 hr tablet     HYDROmorphone HCl (DILAUDID PO)     OMEPRAZOLE PO     promethazine (PHENERGAN) 25 MG tablet     senna-docusate (SENOKOT-S;PERICOLACE) 8.6-50 MG per tablet     tiZANidine (ZANAFLEX) 4 MG tablet     TRAZODONE HCL PO     VITAMIN D, CHOLECALCIFEROL, PO     Zolpidem Tartrate (AMBIEN CR PO)     Zolpidem Tartrate (AMBIEN PO)     No current facility-administered medications for this visit.              Review of Systems:   A focused musculoskeletal and neurologic ROS was performed with pertinent positives and negatives noted in the HPI.  Additional systems were also reviewed and are documented at the bottom of the note.         Physical Exam:   Vitals: Ht 1.76 m (5' 9.29\")   Wt 117.7 kg (259 lb 8 oz)   BMI 38.00 kg/m     Musculoskeletal, Neurologic, and Spine:     Lumbar Spine:                          Appearance - No gross stepoffs or deformities                          Motor -                           L2-3: Hip flexion 5/5 L and 5/5 R strength                           L3/4:  Knee extension L 5/5 and R 5/5 strength                          L4/5:  Foot dorsiflexion R 5/5 L 5/5 and                                       EHL dorsiflexion R 4/5 L 4/5 strength                          S1:  Plantarflexion/Peroneal Muscles  L 5/5 and R 5/5 strength                          Sensation: intact to light touch L3-S1 distribution BLE                             Neurologic:                            REFLEXES Left Right                           Patella 1+ 1+   Ankle jerk 1+ 1+   Babinski No upgoing great toe No upgoing great toe   Clonus 0 beats 0 beats      Hip Exam:  No pain with hip log roll and no tenderness over the greater trochanters.     Alignment:  Patient stands with a neutral standing sagittal balance.                     Imaging:   We ordered and independently reviewed new radiographs at this clinic visit. The results were discussed with the patient. Findings include: "     October 12, 2018 AP lateral scoliosis radiographs show evidence of previous extensive thoracolumbar fusion from T10-S1 with a multi-luis angel construct and evidence of fracture at L2 with cement augmentation.  Severe sagittal imbalance with 16 cm positive based on the C7 soraya line.  Lumbar lordosis measures 26 degrees, pelvic incidence measures 40 degrees.  Thoracic kyphosis measures 54 degrees.     August 9, 2018 thoracic level MRI shows no obvious severe stenosis.     August 9, 2018 thoracic and lumbar CT scans: Evidence of previous medially placed screws which are subsequently removed on the right at T12 and L2.  There is also a medial breach at L1 with residual screw at that level which was not removed.  Solid-appearing fusion from L3-S1 but indeterminant fusion above that.           Assessment and Plan:     58 year old male with lumbar flat back and neuropathic type pain along the right lateral abdominal wall possibly from medial screw placement at the time of his previous surgery.    At this point he is not yet quit smoking.  I would like him to keep his anesthesia visit so we could give him an opinion on his medical morbidity risk with this operation.  Most likely this would require a revision T11 to the pelvis with a pedicle subtraction osteotomy.  I quoted him a 70% morbidity rate particularly risk of infection, neurologic injury based on the recent scoliosis 1 risk trial which might be as high as 10-15% at 12 months follow-up, as well as adjacent segment disease and other complications standard for any lumbar fusion.     Risks of this surgery include risk of infection, risk of dural tear resulting in CSF leak which might result in headaches, or possible need for lumbar drain, or possible revision surgery in the setting of a persistent leak. Risk of hematoma or seroma resulting in wound complications.  Possible nerve root injury resulting in numbness weakness or paralysis into the arms or legs. Possible  radiculitis which could result in similar symptoms or could result in significant neurogenic type pain. There is also a risk of delayed onset nerve root pals or radiculitis, which I explained is potentially due to stretch injury or possibly due to delayed onset swelling, and is sometimes temporary but can also be permanent.  Risk of incomplete decompression which might require revision surgery in the future.  Risk of adjacent segment problems requiring surgery in the future. Risk of incomplete relief of symptoms possibly requiring revision surgery in the future. Furthermore, although rare, there are risks of major vessel injury such as to the major vessels anteriorly or to the bowel from the surgery.  Sometimes this can happen if an instrument is passed anteriorly through the disc space. There is a risk of nonunion which might require revision surgery in the future, and risk of hardware complications from the instrumentation.  I do use imaging to help guide the placement of the instrumentation, but even with this there is a chance of implant malposition or problem.  There is a risk of blood clots in the legs or the lungs.  Lastly, although rare, there are certainly risks of the anesthetic including stroke heart attack and death.        We talked about the fact that it is 6-12-month recovery, possibly a 1 week hospital stay in 1 or 2 nights in the ICU and certainly it is a long road in a major surgery.    He is going to think about it.  If he would like to proceed with surgery he will quit smoking first and then contact me and then I would be happy to try and get things arranged for him.    Salazar Sebastian MD    Answers for HPI/ROS submitted by the patient on 1/1/2019   General Symptoms: Yes  Skin Symptoms: Yes  HENT Symptoms: No  EYE SYMPTOMS: Yes  HEART SYMPTOMS: Yes  LUNG SYMPTOMS: No  INTESTINAL SYMPTOMS: Yes  URINARY SYMPTOMS: Yes  REPRODUCTIVE SYMPTOMS: No  SKELETAL SYMPTOMS: Yes  BLOOD SYMPTOMS:  No  NERVOUS SYSTEM SYMPTOMS: Yes  MENTAL HEALTH SYMPTOMS: Yes  Fever: No  Loss of appetite: No  Weight loss: Yes  Weight gain: No  Fatigue: Yes  Night sweats: Yes  Chills: No  Increased stress: Yes  Excessive hunger: No  Excessive thirst: No  Feeling hot or cold when others believe the temperature is normal: Yes  Loss of height: No  Post-operative complications: No  Surgical site pain: No  Hallucinations: No  Change in or Loss of Energy: Yes  Hyperactivity: No  Confusion: No  Changes in hair: No  Changes in moles/birth marks: No  Itching: Yes  Rashes: No  Changes in nails: No  Acne: No  Change in facial hair: No  Warts: No  Non-healing sores: No  Scarring: No  Flaking of skin: No  Color changes of hands/feet in cold : No  Sun sensitivity: No  Skin thickening: No  Eye pain: No  Vision loss: Yes  Dry eyes: No  Watery eyes: Yes  Eye bulging: No  Double vision: No  Flashing of lights: No  Spots: No  Floaters: No  Redness: No  Crossed eyes: No  Tunnel Vision: No  Yellowing of eyes: No  Eye irritation: No  Chest pain or pressure: No  Fast or irregular heartbeat: No  Pain in legs with walking: Yes  Trouble breathing while lying down: No  Fingers or toes appear blue: No  High blood pressure: No  Low blood pressure: No  Fainting: No  Murmurs: No  Pacemaker: No  Varicose veins: No  Edema or swelling: No  Wake up at night with shortness of breath: No  Light-headedness: No  Exercise intolerance: Yes  Heart burn or indigestion: No  Nausea: No  Vomiting: No  Abdominal pain: Yes  Bloating: No  Constipation: Yes  Diarrhea: Yes  Blood in stool: No  Black stools: No  Rectal or Anal pain: No  Fecal incontinence: No  Yellowing of skin or eyes: No  Vomit with blood: No  Change in stools: No  Trouble holding urine or incontinence: No  Pain or burning: No  Trouble starting or stopping: Yes  Increased frequency of urination: No  Blood in urine: No  Decreased frequency of urination: No  Frequent nighttime urination: No  Flank pain:  No  Difficulty emptying bladder: No  Back pain: Yes  Muscle aches: Yes  Neck pain: No  Swollen joints: No  Joint pain: No  Bone pain: No  Muscle cramps: Yes  Muscle weakness: Yes  Joint stiffness: Yes  Bone fracture: No  Trouble with coordination: No  Dizziness or trouble with balance: Yes  Fainting or black-out spells: No  Memory loss: Yes  Headache: No  Seizures: No  Speech problems: No  Tingling: No  Tremor: No  Weakness: Yes  Difficulty walking: Yes  Paralysis: No  Numbness: Yes  Nervous or Anxious: Yes  Depression: Yes  Trouble sleeping: Yes  Trouble thinking or concentrating: Yes  Mood changes: Yes  Panic attacks: Yes

## 2019-01-11 NOTE — PROGRESS NOTES
Spine Surgery Return Clinic Visit      Chief Complaint:   Follow Up (Thoracic and lumbar spine, work comp. Thoracic spine revision consult. S/P L3-T10 fusion. Patient has a same day PAC appointment  ) and Results (Dexa, Thoracic and Lumbar CT myelograms, Epidural steroid injection.)      Interval HPI:  Symptom Profile Including: location of symptoms, onset, severity, exacerbating/alleviating factors, previous treatments:        John Olvera is a 58 year old male who is referred to me by Dr. Zuhair Cisse in [place] for evaluation of lumbar flat back with a very complicated past procedure.  He has had multiple previous lumbar operations.  Originally an L3 to the S1 level which was subsequently complicated by adjacent segment fracture which was treated with vertebroplasty.  He went on to have continuing pain and subsequently then in February 2018 had an extension of the previous fusion up to the T10 level.  Unfortunately this most recent procedure was complicated in multiple ways.  He did develop a deep infection and had 6 weeks of IV antibiotics.  Furthermore he had missed placed screws and he did have to return to the operating room for screw removal and ever since that time he has had severe right thoracic rib cage radicular complaints.     On interview today he is bothered by 2 things.  First the right thoracic rib cage pain is never gotten better.  He says it was present as soon as he woke up after the February surgery.  In addition he cannot stand up straight.  He feels like he bends forward when he walks.  He has severe low back pain from this.  He denies any radicular complaints into the legs.     He is tried multiple oral medications including gabapentin anti-inflammatories and is been doing some physical therapy.  He thinks he is gained 30-40 pounds since the surgery because of inactivity.  He has started smoking again.    I last saw him a couple months ago and sent him for nutrition counseling and smoking  cessation.  He has lost 13 pounds but has not quit smoking.  He did have a lumbar epidural injection which was a right-sided transforaminal injection.  I order this at L1 and he is not sure if it was done at that level but he thinks that it was.  He says this helped him for about 12 hours and then the symptoms returned.  He is still quite bothered by the burning nerve type pain.    Addendum: The patient contacted us to state that he quit smoking in February.              Past Medical History:     Past Medical History:   Diagnosis Date     Anxiety      Chronic back pain      CKD (chronic kidney disease)      Class 3 severe obesity due to excess calories with serious comorbidity and body mass index (BMI) of 40.0 to 44.9 in adult (H) 10/12/2018     Depression      GERD (gastroesophageal reflux disease)      Lumbar radiculopathy 10/12/2018     Prediabetes             Past Surgical History:     Past Surgical History:   Procedure Laterality Date     BACK SURGERY      laminectomy     BACK SURGERY      fixation kyphoplasty lumbar spine     BACK SURGERY      spinal fusion     COLONOSCOPY       EPIDIDYMECTOMY              Social History:     Social History     Tobacco Use     Smoking status: Former Smoker     Packs/day: 1.00     Years: 40.00     Pack years: 40.00     Last attempt to quit: 2017     Years since quittin.1   Substance Use Topics     Alcohol use: Yes     Comment: 1-2 drinks monthly            Family History:     Family History   Problem Relation Age of Onset     Pancreatic Cancer Father      Bladder Cancer Brother             Allergies:   No Known Allergies         Medications:     Current Outpatient Medications   Medication     BusPIRone HCl (BUSPAR PO)     Escitalopram Oxalate (LEXAPRO PO)     gabapentin (GRALISE) 300 MG 24 hr tablet     HYDROmorphone HCl (DILAUDID PO)     OMEPRAZOLE PO     promethazine (PHENERGAN) 25 MG tablet     senna-docusate (SENOKOT-S;PERICOLACE) 8.6-50 MG per tablet  "    tiZANidine (ZANAFLEX) 4 MG tablet     TRAZODONE HCL PO     VITAMIN D, CHOLECALCIFEROL, PO     Zolpidem Tartrate (AMBIEN CR PO)     Zolpidem Tartrate (AMBIEN PO)     No current facility-administered medications for this visit.              Review of Systems:   A focused musculoskeletal and neurologic ROS was performed with pertinent positives and negatives noted in the HPI.  Additional systems were also reviewed and are documented at the bottom of the note.         Physical Exam:   Vitals: Ht 1.76 m (5' 9.29\")   Wt 117.7 kg (259 lb 8 oz)   BMI 38.00 kg/m    Musculoskeletal, Neurologic, and Spine:     Lumbar Spine:                          Appearance - No gross stepoffs or deformities                          Motor -                           L2-3: Hip flexion 5/5 L and 5/5 R strength                           L3/4:  Knee extension L 5/5 and R 5/5 strength                          L4/5:  Foot dorsiflexion R 5/5 L 5/5 and                                       EHL dorsiflexion R 4/5 L 4/5 strength                          S1:  Plantarflexion/Peroneal Muscles  L 5/5 and R 5/5 strength                          Sensation: intact to light touch L3-S1 distribution BLE                             Neurologic:                            REFLEXES Left Right                           Patella 1+ 1+   Ankle jerk 1+ 1+   Babinski No upgoing great toe No upgoing great toe   Clonus 0 beats 0 beats      Hip Exam:  No pain with hip log roll and no tenderness over the greater trochanters.     Alignment:  Patient stands with a neutral standing sagittal balance.                     Imaging:   We ordered and independently reviewed new radiographs at this clinic visit. The results were discussed with the patient. Findings include:     October 12, 2018 AP lateral scoliosis radiographs show evidence of previous extensive thoracolumbar fusion from T10-S1 with a multi-luis angel construct and evidence of fracture at L2 with cement augmentation.  " Severe sagittal imbalance with 16 cm positive based on the C7 soraya line.  Lumbar lordosis measures 26 degrees, pelvic incidence measures 40 degrees.  Thoracic kyphosis measures 54 degrees.     August 9, 2018 thoracic level MRI shows no obvious severe stenosis.     August 9, 2018 thoracic and lumbar CT scans: Evidence of previous medially placed screws which are subsequently removed on the right at T12 and L2.  There is also a medial breach at L1 with residual screw at that level which was not removed.  Solid-appearing fusion from L3-S1 but indeterminant fusion above that.           Assessment and Plan:     58 year old male with lumbar flat back and neuropathic type pain along the right lateral abdominal wall possibly from medial screw placement at the time of his previous surgery.    At this point he is not yet quit smoking.  I would like him to keep his anesthesia visit so we could give him an opinion on his medical morbidity risk with this operation.  Most likely this would require a revision T11 to the pelvis with a pedicle subtraction osteotomy.  I quoted him a 70% morbidity rate particularly risk of infection, neurologic injury based on the recent scoliosis 1 risk trial which might be as high as 10-15% at 12 months follow-up, as well as adjacent segment disease and other complications standard for any lumbar fusion.     Risks of this surgery include risk of infection, risk of dural tear resulting in CSF leak which might result in headaches, or possible need for lumbar drain, or possible revision surgery in the setting of a persistent leak. Risk of hematoma or seroma resulting in wound complications.  Possible nerve root injury resulting in numbness weakness or paralysis into the arms or legs. Possible radiculitis which could result in similar symptoms or could result in significant neurogenic type pain. There is also a risk of delayed onset nerve root pals or radiculitis, which I explained is potentially due  to stretch injury or possibly due to delayed onset swelling, and is sometimes temporary but can also be permanent.  Risk of incomplete decompression which might require revision surgery in the future.  Risk of adjacent segment problems requiring surgery in the future. Risk of incomplete relief of symptoms possibly requiring revision surgery in the future. Furthermore, although rare, there are risks of major vessel injury such as to the major vessels anteriorly or to the bowel from the surgery.  Sometimes this can happen if an instrument is passed anteriorly through the disc space. There is a risk of nonunion which might require revision surgery in the future, and risk of hardware complications from the instrumentation.  I do use imaging to help guide the placement of the instrumentation, but even with this there is a chance of implant malposition or problem.  There is a risk of blood clots in the legs or the lungs.  Lastly, although rare, there are certainly risks of the anesthetic including stroke heart attack and death.        We talked about the fact that it is 6-12-month recovery, possibly a 1 week hospital stay in 1 or 2 nights in the ICU and certainly it is a long road in a major surgery.    He is going to think about it.  If he would like to proceed with surgery he will quit smoking first and then contact me and then I would be happy to try and get things arranged for him.    Addendum:  The patient contacted us and says that he quit smoking in February.  We are going to try to get surgery scheduled for him.    Respectfully,  Salazar Sebastian MD  Spine Surgery  [facility]    Answers for HPI/ROS submitted by the patient on 1/1/2019   General Symptoms: Yes  Skin Symptoms: Yes  HENT Symptoms: No  EYE SYMPTOMS: Yes  HEART SYMPTOMS: Yes  LUNG SYMPTOMS: No  INTESTINAL SYMPTOMS: Yes  URINARY SYMPTOMS: Yes  REPRODUCTIVE SYMPTOMS: No  SKELETAL SYMPTOMS: Yes  BLOOD SYMPTOMS: No  NERVOUS SYSTEM  SYMPTOMS: Yes  MENTAL HEALTH SYMPTOMS: Yes  Fever: No  Loss of appetite: No  Weight loss: Yes  Weight gain: No  Fatigue: Yes  Night sweats: Yes  Chills: No  Increased stress: Yes  Excessive hunger: No  Excessive thirst: No  Feeling hot or cold when others believe the temperature is normal: Yes  Loss of height: No  Post-operative complications: No  Surgical site pain: No  Hallucinations: No  Change in or Loss of Energy: Yes  Hyperactivity: No  Confusion: No  Changes in hair: No  Changes in moles/birth marks: No  Itching: Yes  Rashes: No  Changes in nails: No  Acne: No  Change in facial hair: No  Warts: No  Non-healing sores: No  Scarring: No  Flaking of skin: No  Color changes of hands/feet in cold : No  Sun sensitivity: No  Skin thickening: No  Eye pain: No  Vision loss: Yes  Dry eyes: No  Watery eyes: Yes  Eye bulging: No  Double vision: No  Flashing of lights: No  Spots: No  Floaters: No  Redness: No  Crossed eyes: No  Tunnel Vision: No  Yellowing of eyes: No  Eye irritation: No  Chest pain or pressure: No  Fast or irregular heartbeat: No  Pain in legs with walking: Yes  Trouble breathing while lying down: No  Fingers or toes appear blue: No  High blood pressure: No  Low blood pressure: No  Fainting: No  Murmurs: No  Pacemaker: No  Varicose veins: No  Edema or swelling: No  Wake up at night with shortness of breath: No  Light-headedness: No  Exercise intolerance: Yes  Heart burn or indigestion: No  Nausea: No  Vomiting: No  Abdominal pain: Yes  Bloating: No  Constipation: Yes  Diarrhea: Yes  Blood in stool: No  Black stools: No  Rectal or Anal pain: No  Fecal incontinence: No  Yellowing of skin or eyes: No  Vomit with blood: No  Change in stools: No  Trouble holding urine or incontinence: No  Pain or burning: No  Trouble starting or stopping: Yes  Increased frequency of urination: No  Blood in urine: No  Decreased frequency of urination: No  Frequent nighttime urination: No  Flank pain: No  Difficulty emptying  bladder: No  Back pain: Yes  Muscle aches: Yes  Neck pain: No  Swollen joints: No  Joint pain: No  Bone pain: No  Muscle cramps: Yes  Muscle weakness: Yes  Joint stiffness: Yes  Bone fracture: No  Trouble with coordination: No  Dizziness or trouble with balance: Yes  Fainting or black-out spells: No  Memory loss: Yes  Headache: No  Seizures: No  Speech problems: No  Tingling: No  Tremor: No  Weakness: Yes  Difficulty walking: Yes  Paralysis: No  Numbness: Yes  Nervous or Anxious: Yes  Depression: Yes  Trouble sleeping: Yes  Trouble thinking or concentrating: Yes  Mood changes: Yes  Panic attacks: Yes

## 2019-01-11 NOTE — ANESTHESIA PREPROCEDURE EVALUATION
Anesthesia Pre-Procedure Evaluation    Patient: John Olvera   MRN:     [id number] Gender:   male   Age:    58 year old :      1960        Preoperative Diagnosis: * No surgery found *        Past Medical History:   Diagnosis Date     Anxiety      Chronic back pain      CKD (chronic kidney disease)      Class 3 severe obesity due to excess calories with serious comorbidity and body mass index (BMI) of 40.0 to 44.9 in adult (H) 10/12/2018     Depression      GERD (gastroesophageal reflux disease)      Lumbar radiculopathy 10/12/2018     Prediabetes       Past Surgical History:   Procedure Laterality Date     BACK SURGERY      laminectomy     BACK SURGERY      fixation kyphoplasty lumbar spine     BACK SURGERY      spinal fusion     COLONOSCOPY       EPIDIDYMECTOMY            Anesthesia Evaluation     . Pt has had prior anesthetic. Type: General    No history of anesthetic complications          ROS/MED HX    ENT/Pulmonary:     (+)NICOLE risk factors obese, tobacco use, Current use 3/4 PPD packs/day  , . .    Neurologic:     (+)other neuro Lumbar radiculopathy    Cardiovascular:  - neg cardiovascular ROS   (+) ----. : . . . :. . Previous cardiac testing date:results:date: results:ECG reviewed date:19 results:Normal sinus rhythm date: results:          METS/Exercise Tolerance:  >4 METS   Hematologic:  - neg hematologic  ROS       Musculoskeletal:   (+) , , other musculoskeletal- Low back pain, inability to stand straight      GI/Hepatic:     (+) GERD Asymptomatic on medication,       Renal/Genitourinary:     (+) chronic renal disease, type: CRI, Pt does not require dialysis, Pt has no history of transplant,       Endo:     (+) Obesity, .      Psychiatric:     (+) psychiatric history anxiety and depression      Infectious Disease:  - neg infectious disease ROS       Malignancy:      - no malignancy   Other:    (+) No chance of pregnancy C-spine cleared: N/A, H/O Chronic Pain,H/O chronic opiod use ,  "no other significant disability                        PHYSICAL EXAM:   Mental Status/Neuro: A/A/O; Age Appropriate   Airway: Facies: Feasible  Mallampati: II  Mouth/Opening: Full  TM distance: > 6 cm  Neck ROM: Limited   Respiratory: Auscultation: CTAB     Resp. Rate: Normal     Resp. Effort: Normal      CV: Rhythm: Regular  Heart: Normal Sounds   Comments:      Dental: Normal                  Lab Results   Component Value Date    ALBUMIN 3.4 10/12/2018       Preop Vitals  BP Readings from Last 3 Encounters:   01/11/19 131/76    Pulse Readings from Last 3 Encounters:   01/11/19 78      Resp Readings from Last 3 Encounters:   No data found for Resp    SpO2 Readings from Last 3 Encounters:   01/11/19 95%      Temp Readings from Last 1 Encounters:   01/11/19 98.4  F (36.9  C) (Oral)    Ht Readings from Last 1 Encounters:   01/11/19 1.778 m (5' 10\")      Wt Readings from Last 1 Encounters:   01/11/19 118 kg (260 lb 3.2 oz)    Estimated body mass index is 37.33 kg/m  as calculated from the following:    Height as of this encounter: 1.778 m (5' 10\").    Weight as of this encounter: 118 kg (260 lb 3.2 oz).     LDA:            JJAYROG FV AN PLAN NO PONV RULE         PAC Discussion and Assessment    ASA Classification: 3  [place] [facility] [place]esthetic techniques and relevant risks discussed: GA  Invasive monitoring and risk discussed: No  Types:   Possibility and Risk of blood transfusion discussed: Yes  NPO instructions given:   Additional anesthetic preparation and risks discussed:   Needs early admission to pre-op area:   Other:     PAC Resident/NP Anesthesia Assessment:  See consult note    Reviewed and Signed by PAC Mid-Level Provider/Resident  Mid-Level Provider/Resident: SHIVANI Kulkarni, CNS  Date: 1/11/19  Time: 1:04pm    Attending Anesthesiologist Anesthesia Assessment:  I saw the patient and discussed with the TELLO as above.  Mr. Olvera is a 58 year old male who is being evaluated for possible spine " surgery with Dr. Sebastian. He has a history of obesity (BMI 37), anxiety/depression, CKD (stable), chronic pain, and tobacco abuse. He is relatively active and is able to easily achieve 4 METS of activity. He appears to be optimized at this time from an anesthetic perspective.     Reviewed and Signed by PAC Anesthesiologist  Anesthesiologist: MELISSA  Date: 1/11/19  Time:   Pass/Fail: Pass  Disposition:     PAC Pharmacist Assessment:  PREOPERATIVE PAIN CONSULT FOR POSTOPERATIVE PAIN MANAGEMENT  John Olvera was seen and interviewed during PAC Clinic appointment on January 11, 2019 in preparation for a possible revision T11-pelvis surgery with pedicle subtraction osteotomy.  Patient is here for an anesthesia visit to discuss risk of  Medical morbidtiy with this operation.  Per discussion with patient he definitely will not have surgery in the next month and likely not for ~4 months, but this is yet to be determined.  Given this will not be within 30 days of the operation we spoke briefly about what he can expect for perioperative pain management, but that we would speak in more specifics if the surgery gets planned and is seen in PAC prior to the operation.      - OUTPATIENT MEDICATIONS (related to pain management):  -- Long-acting opioid: none  -- Short-acting opioid: hydromorphone 8 mg PO QID scheduled  -- Oral adjuvant(s): gabapentin 800 mg PO QID, tizanidine 4 mg PO BID PRN, ibuprofen PRN (uses rarely for headaches)  -- Topicals: none  -- Bowel regimen: senna-docusate 1 tab QID PRN   -- Other relevant medications: escitalopram 20 mg daily, buspar 15 mg daily with supper, trazodone 150 mg PO at bedtime, zolpidem CR 12.5 mg at bedtime     ASSESSMENT:    John Olvera has a history of back injury in 1999 after picking up a box of bleach containers at his job.  He has had 5 previous spinal fusion surgeries most recently about 11 months ago which was complicated by infection as well as ongoing R sided radicular back pain  that has not resolved.  He is opioid tolerant, on relatively high doses of opioid analgesic at stable doses. Outpatient opioids prescribed by pain specialist in [facility] Dr. Sal Ramírez.  He has been on daily opioid therapy since about 2000 and he has been on dilaudid for the majority of that time.  He is able to walk and does this every day (2-3 miles/day) in an effort to lose weight.  He has not been able to work since the original accident and he has a hard time getting to sleep, but sleeps well when he does get to sleep (~6-7 hr).  He denies any sedation with the opioids and he does have constipation but this is well managed with his senna regimen.  He denies any history of NICOLE but does snore and has risk factors for NICOLE.  He rarely (1x/month) drinks any alcohol, he denies any recreational drug abuse, and denies any opioid abuse/misuse/diversion.  He is still smoking 3/4 pack per day and this along with his desire to lose more weight prior to surgery are what is pushing the operation back as he needs to quit smoking before surgery.  He has worked with his pain specialist to reduce doses of his opioids prior to surgery in the past and was open to doing this again.  I suggested he discuss this with his pain specialist at their next visit and devise a plan to attempt to reduce opioid doses as able prior to surgery.  Generally John reports he has not had too many issues with postoperative pain control after his other back surgeries and is open to a multimodal pain management strategy.      Outpatient opioid oral morphine equivalent (OME): 128 mg OME/day    Other pain therapies tried in the past (not an all inclusive list):   PCA - patient has received PCA in the past without complication  Oxycodone - doesn't recall exactly, but doesn't think that this worked that well for him in the past  Morphine - tolerated via PCA in the past  Does not report any adverse effects from opioids in the past.   Dilaudid - has  worked the best for his pain, would prefer to stay with this medication perioperatively    Pain therapies never tried (not an all inclusive list):   Ketamine - denies seizures, uncontrolled hypertension, cardiac arrhythmias, PTSD, BPAD, schizophrenia, psychiatric disorders (eg. hallucinations/delirium), history of brain cancer, cardiac failure, previous CVA and increased IOP/glaucoma and is open to using ketamine for pain control.     RECOMMENDATIONS:     - PREOPERATIVE:  - Continue to follow with your pain provider and discuss possibility of reducing opioid doses prior to surgery as you have done in the past.   - Recommend being seen in PAC clinic again once surgery scheduled and when within 30 days of the operation to develop formal plan based on current medication regimen.     Derian Soria [facility]  January 11, 2019  1:00 PM      Reviewed and Signed by PAC Pharmacist  Pharmacist: KATHIA  Date: 1/11/19  Time:1400        SHIVANI Witt

## 2019-01-11 NOTE — CONSULTS
Anesthesia Consult Note      Reason for consult: Flatback syndrome    Date of Encounter: 1/11/2019  Referring Physician: Dr. Sebastian  Primary Care Physician:  Sarah Daly  John Olvera is a 58 year old male who presents for anesthesia consult in preparation for complex thoracolumbar reconstructive surgery with Dr. Sebastian, ameena TBD in the next few months at [facility]. History is obtained from the patient.     Patient was referred to Dr. Sebastian with concerns for persistent right rib pain and inability to stand up straight. This also causes back pain. He is s/p multiple and complicated lumbar procedures in [place]. His surgery in February 2018 was complicated by infection.   He is not able to be active and has gained 30-40 pounds. He has tried multiple conservative measures including medications, physical therapy and more recent right-sided transforaminal injection. None of these have provided lasting relief and he continues to have a burning nerve pain.     Dr. Sebastian is discussing a possible complex spine procedure with patient but has also asked patient to work on weight loss, smoking cessation and nutrition.     His history is otherwise significant for GERD, preDM, CKD, anxiety and depression.     Past Medical History  Past Medical History:   Diagnosis Date     Anxiety      Chronic back pain      CKD (chronic kidney disease)      Class 3 severe obesity due to excess calories with serious comorbidity and body mass index (BMI) of 40.0 to 44.9 in adult (H) 10/12/2018     Depression      GERD (gastroesophageal reflux disease)      Lumbar radiculopathy 10/12/2018     Prediabetes        Past Surgical History  Past Surgical History:   Procedure Laterality Date     BACK SURGERY  2015    laminectomy     BACK SURGERY      fixation kyphoplasty lumbar spine     BACK SURGERY  2001    spinal fusion     COLONOSCOPY       EPIDIDYMECTOMY  1998         Prior to  Admission Medications  Current Outpatient Medications   Medication Sig Dispense Refill     BusPIRone HCl (BUSPAR PO) Take 15 mg by mouth daily (with dinner)       Escitalopram Oxalate (LEXAPRO PO) Take 20 mg by mouth daily       gabapentin (NEURONTIN) 800 MG tablet Take 800 mg by mouth 4 times daily       HYDROmorphone HCl (DILAUDID PO) Take 8 mg by mouth 4 times daily       ibuprofen (ADVIL/MOTRIN) 200 MG tablet Take 400 mg by mouth every 8 hours as needed for mild pain       OMEPRAZOLE PO Take 20 mg by mouth every morning       promethazine (PHENERGAN) 25 MG tablet Take 25 mg by mouth every morning       senna-docusate (SENOKOT-S;PERICOLACE) 8.6-50 MG per tablet Take 1 tablet by mouth 4 times daily as needed for constipation       tiZANidine (ZANAFLEX) 4 MG tablet Take 4 mg by mouth 2 times daily as needed        TRAZODONE HCL PO Take 150 mg by mouth At Bedtime       VITAMIN D, CHOLECALCIFEROL, PO Take 5,000 Units by mouth daily       Zolpidem Tartrate (AMBIEN CR PO) Take 12.5 mg by mouth At Bedtime         Allergies  No Known Allergies    Social History  Social History     Socioeconomic History     Marital status:      Spouse name: Not on file     Number of children: Not on file     Years of education: Not on file     Highest education level: Not on file   Social Needs     Financial resource strain: Not on file     Food insecurity - worry: Not on file     Food insecurity - inability: Not on file     Transportation needs - medical: Not on file     Transportation needs - non-medical: Not on file   Occupational History     Not on file   Tobacco Use     Smoking status: Current Every Day Smoker     Packs/day: 1.00     Years: 40.00     Pack years: 40.00     Last attempt to quit: 2017     Years since quittin.1     Smokeless tobacco: Never Used     Tobacco comment: Now 3/4 PPD   Substance and Sexual Activity     Alcohol use: Yes     Comment: 1-2 drinks monthly     Drug use: No     Sexual activity: Not  "on file   Other Topics Concern     Not on file   Social History Narrative     Not on file       Family History  Family History   Problem Relation Age of Onset     Pancreatic Cancer Father      Bladder Cancer Brother        Review of Systems    The complete review of systems is negative other than noted in the HPI or here.        ROS/MED HX     ENT/Pulmonary:     (+)NICOLE risk factors obese, tobacco use, Current use 3/4 PPD packs/day  , . .    Neurologic:     (+)other neuro Lumbar radiculopathy    Cardiovascular:  - neg cardiovascular ROS   (+) ----. : . . . :. . Previous cardiac testing date:results:date: results:ECG reviewed date:1/11/19 results:Normal sinus rhythm date: results:           METS/Exercise Tolerance:  >4 METS   Hematologic:  - neg hematologic  ROS        Musculoskeletal:   (+) , , other musculoskeletal- Low back pain, inability to stand straight       GI/Hepatic:     (+) GERD Asymptomatic on medication,        Renal/Genitourinary:     (+) chronic renal disease, type: CRI, Pt does not require dialysis, Pt has no history of transplant,        Endo:     (+) Obesity, .       Psychiatric:     (+) psychiatric history anxiety and depression       Infectious Disease:  - neg infectious disease ROS        Malignancy:      - no malignancy   Other:    (+) No chance of pregnancy C-spine cleared: N/A, H/O Chronic Pain,H/O chronic opiod use , no other significant disability               PHYSICAL EXAM:   Mental Status/Neuro: A/A/O; Age Appropriate   Airway: Facies: Feasible  Mallampati: II  Mouth/Opening: Full  TM distance: > 6 cm  Neck ROM: Limited   Respiratory: Auscultation: CTAB     Resp. Rate: Normal     Resp. Effort: Normal      CV: Rhythm: Regular  Heart: Normal Sounds   Comments:        Dental: Normal                     Temp: 98.4  F (36.9  C) Temp src: Oral BP: 131/76 Pulse: 78     SpO2: 95 %         260 lbs 3.2 oz  5' 10\"   Body mass index is 37.33 kg/m .       Physical Exam    Constitutional: Awake, alert, " cooperative, no apparent distress, and appears stated age.  Eyes: Pupils equal, round and reactive to light, extra ocular muscles intact, sclera clear, conjunctiva normal.  HENT: Normocephalic, oral pharynx with moist mucus membranes. Glasses on.  Respiratory: Clear to auscultation bilaterally, no crackles or wheezing. No cough or obvious dyspnea.  Cardiovascular: Regular rate and rhythm, normal S1 and S2, and no murmur noted.  Carotids +2, no bruits. No edema. Palpable pulses to radial  DP and PT arteries.   GI: Normal bowel sounds, soft, non-distended, non-tender, no masses palpated. Difficult exam due to obese abdomen.  Skin: Warm and dry.    Musculoskeletal: There is no redness, warmth, or swelling of the joints. Gross motor strength is normal. Some grimacing with position changes.   Neurologic: Awake, alert, oriented to name, place and time. Cranial nerves II-XII are grossly intact. Gait is cautious with head down posture.   Neuropsychiatric: Calm, cooperative. Normal affect.     Labs / testing: (personally reviewed) OSH 11/27/18  Na 140  K 4.2  Cl 105  Glu 154  Cr 1.30  GFR 57  WBC 6.2  Hgb 13.4  hematocrit 40.1  Platelets 154  INR 0.96  PTT 26  Xray spine complete 10/12/18                                                                   Impression:  1. Postsurgical changes of fusion instrumentation at T10 to S1.  Hardware grossly intact. Compression deformity L2 with cement which is  unchanged.  2. Very positive global sagittal imbalance.    CT Thoracic spine 11/27/18  Impression:   1. Postoperative changes of lower thoracic and lumbar spine with pedicle screws and stabilization rods in place.  2. No high-grade central canal nor neural foraminal stenosis.  3. Previously described minimal compression fractures are stable.  4. Degenerative changes are noted.    CT Lumbar spine 11/17/18  Impression:  1. No high-grade central canal nor neural foraminal stenosis is apparent.  2. There are degenerative changes  present, most prominent at L2-L3.  3. T12 to S1 fusion via pedicle screws and stabilization rods.    MRI thoracic spine 8/9/18  Impression: T9 and T10 interspace significant neural foraminal narrowing.    Imaging reviewed by this provider    Outside records reviewed from: Care Everywhere    ASSESSMENT and PLAN  John Olvera is a 58 year old male being considered for complex thoracolumbar reconstructive surgery with Dr. Sebastian, date TBD. He has the following specific operative considerations:   RCRI: No serious cardiac risks  VTE: 1.8%  NICOLE RF 3/8=Intermediate risk  PONV: 2    Chronic back pain with inability to stand up straight, s/p multiple procedures. Above procedure being considered. Currently on Dilaudid and opioid tolerant. Discussed with Pharm D today. Patient plans to work with local pain specialist to attempt to decrease amount of pain medication prior to a planned surgery. Zanaflex prn. Will take Gabapentin on DOS.  No cardiac history, symptoms or meds. EKG NSR. Activity limited by pain but is walking 2-3 miles daily.    Current smoker.40 pack years. Now 3/4 PPD. Denies pulmonary symptoms. Has a smoking cessation plan prior to surgery.   GERD. Will take Omeprazole on DOS.   Pre DIABETES MELLITUS. Last A1C 6.2. Has lost 17 pounds to date.  CKD. Cr. 1.30. GFR 57.   No history of blood transfusion. Will need type and screen prior to surgery.   Anxiety/depression. Will take Lexapro on DOS. Buspar at HS.     Patient discussed with Dr. Posada.    SHIVANI Witt CNS      Preoperative Assessment Center  [facility]  Office phone: [phone]  Fax: [phone]

## 2019-01-11 NOTE — NURSING NOTE
"Reason For Visit:   Chief Complaint   Patient presents with     Follow Up     Thoracic and lumbar spine, work comp. Thoracic spine revision consult. S/P L3-T10 fusion. Patient has a same day PAC appointment       Results     Dexa, Thoracic and Lumbar CT myelograms, Epidural steroid injection.       Primary MD: Sarah Daly  Ref. MD: Self     ?  No    Occupation disability.  Currently working? No.  Work status?  On disability.    Date of injury: may 1999  Type of injury: acute bent over picking up a case of bleach     Date of surgery: February 27 2018,   Type of surgery: L3- T10. Then screw removal recently   Patient stated that he has a history of 4 back surgeries.     Smoker: Yes  Request smoking cessation information: No      Ht 1.76 m (5' 9.29\")   Wt 117.7 kg (259 lb 8 oz)   BMI 38.00 kg/m      Pain Assessment  Patient Currently in Pain: Yes  0-10 Pain Scale: 8  Primary Pain Location: Back(Right lower-mid)  Pain Descriptors: Aching, Throbbing, Constant  Alleviating Factors: (Sleeping)  Aggravating Factors: (\"Everything\")    Oswestry (ANNITA) Questionnaire    OSWESTRY DISABILITY INDEX 1/1/2019   Count 9   Sum 21   Oswestry Score (%) 46.67   Some recent data might be hidden            Neck Disability Index (NDI) Questionnaire    No flowsheet data found.                Promis 10 Assessment    PROMIS 10 1/1/2019   In general, would you say your health is: Fair   In general, would you say your quality of life is: Fair   In general, how would you rate your physical health? Poor   In general, how would you rate your mental health, including your mood and your ability to think? Fair   In general, how would you rate your satisfaction with your social activities and relationships? Fair   In general, please rate how well you carry out your usual social activities and roles Poor   To what extent are you able to carry out your everyday physical activities such as walking, climbing stairs, carrying groceries, or " moving a chair? A little   How often have you been bothered by emotional problems such as feeling anxious, depressed or irritable? Sometimes   How would you rate your fatigue on average? Severe   How would you rate your pain on average?   0 = No Pain  to  10 = Worst Imaginable Pain 7   In general, would you say your health is: 2   In general, would you say your quality of life is: 2   In general, how would you rate your physical health? 1   In general, how would you rate your mental health, including your mood and your ability to think? 2   In general, how would you rate your satisfaction with your social activities and relationships? 2   In general, please rate how well you carry out your usual social activities and roles. (This includes activities at home, at work and in your community, and responsibilities as a parent, child, spouse, employee, friend, etc.) 1   To what extent are you able to carry out your everyday physical activities such as walking, climbing stairs, carrying groceries, or moving a chair? 2   In the past 7 days, how often have you been bothered by emotional problems such as feeling anxious, depressed, or irritable? 3   In the past 7 days, how would you rate your fatigue on average? 4   In the past 7 days, how would you rate your pain on average, where 0 means no pain, and 10 means worst imaginable pain? 7   Global Mental Health Score 9   Global Physical Health Score 7   PROMIS TOTAL - SUBSCORES 16   Some recent data might be hidden                Roxanna Diaz LPN

## 2019-01-11 NOTE — PROGRESS NOTES
Preoperative Assessment Center medication history for January 11, 2019 is complete.    See Epic admission navigator for prior to admission medications.   Operating room staff will still need to confirm medications and last dose information on day of surgery.     Medication history interview sources:  patient, patient's handwritten med list    Changes made to PTA medication list (reason)  Added: none  Deleted: duplicate ambien, ibuprofen PRN.   Changed: gabapentin dosage form updated    Additional medication history information (including reliability of information, actions taken by pharmacist):  -- No recent (within 30 days) course of antibiotics  -- No recent (within 30 days) course of steroids  -- No recent (within 30 days) chronic daily medications stopped   -- Patient declines being on any other prescription or over-the-counter medications       Prior to Admission medications    Medication Sig Last Dose Taking? Auth Provider   BusPIRone HCl (BUSPAR PO) Take 15 mg by mouth daily (with dinner) Taking Yes Reported, Patient   Escitalopram Oxalate (LEXAPRO PO) Take 20 mg by mouth daily Taking Yes Reported, Patient   gabapentin (NEURONTIN) 800 MG tablet Take 800 mg by mouth 4 times daily Taking Yes Unknown, Entered By History   HYDROmorphone HCl (DILAUDID PO) Take 8 mg by mouth 4 times daily Taking Yes Reported, Patient   ibuprofen (ADVIL/MOTRIN) 200 MG tablet Take 400 mg by mouth every 8 hours as needed for mild pain Taking Yes Unknown, Entered By History   OMEPRAZOLE PO Take 20 mg by mouth every morning Taking Yes Reported, Patient   promethazine (PHENERGAN) 25 MG tablet Take 25 mg by mouth every morning Taking Yes Reported, Patient   senna-docusate (SENOKOT-S;PERICOLACE) 8.6-50 MG per tablet Take 1 tablet by mouth 4 times daily as needed for constipation Taking Yes Reported, Patient   tiZANidine (ZANAFLEX) 4 MG tablet Take 4 mg by mouth 2 times daily as needed  Taking Yes Reported, Patient   TRAZODONE HCL PO Take  150 mg by mouth At Bedtime Taking Yes Reported, Patient   VITAMIN D, CHOLECALCIFEROL, PO Take 5,000 Units by mouth daily Taking Yes Reported, Patient   Zolpidem Tartrate (AMBIEN CR PO) Take 12.5 mg by mouth At Bedtime Taking Yes Reported, Patient          Medication history completed by: Derian Soria [facility]

## 2019-01-15 LAB — INTERPRETATION ECG - MUSE: NORMAL

## 2019-03-02 ENCOUNTER — MYC MEDICAL ADVICE (OUTPATIENT)
Dept: ORTHOPEDICS | Facility: CLINIC | Age: 59
End: 2019-03-02

## 2019-03-05 DIAGNOSIS — F17.200 TOBACCO USE DISORDER: Primary | ICD-10-CM

## 2019-03-05 NOTE — TELEPHONE ENCOUNTER
Left  for patient to call back and schedule PAC and Dr. Sebastian visit on the same day to discuss surgery. Left [phone]

## 2019-03-06 NOTE — TELEPHONE ENCOUNTER
Patient returned call to schedule clinic visits with Dr. Sebastian and PAC clinics to discuss surgery on 4/12/19

## 2019-03-08 PROBLEM — E03.9 HYPOTHYROIDISM: Status: ACTIVE | Noted: 2018-05-21

## 2019-03-08 PROBLEM — M79.606 CHRONIC LEG PAIN: Status: ACTIVE | Noted: 2019-03-08

## 2019-03-08 PROBLEM — M54.40 CHRONIC BILATERAL LOW BACK PAIN WITH SCIATICA: Status: ACTIVE | Noted: 2017-07-10

## 2019-03-08 PROBLEM — G47.00 INSOMNIA: Status: ACTIVE | Noted: 2019-03-08

## 2019-03-08 PROBLEM — G89.29 CHRONIC RIGHT-SIDED THORACIC BACK PAIN: Status: ACTIVE | Noted: 2018-08-16

## 2019-03-08 PROBLEM — M54.9 CHRONIC BACK PAIN: Status: ACTIVE | Noted: 2019-03-08

## 2019-03-08 PROBLEM — T81.49XA POSTOPERATIVE WOUND INFECTION: Status: ACTIVE | Noted: 2018-05-18

## 2019-03-08 PROBLEM — N18.30 CKD (CHRONIC KIDNEY DISEASE), STAGE III (H): Status: ACTIVE | Noted: 2019-03-08

## 2019-03-08 PROBLEM — K21.9 GASTROESOPHAGEAL REFLUX DISEASE: Status: ACTIVE | Noted: 2019-03-08

## 2019-03-08 PROBLEM — E78.5 HYPERLIPIDEMIA: Status: ACTIVE | Noted: 2019-03-08

## 2019-03-08 PROBLEM — Z87.898 HISTORY OF PREDIABETES: Status: ACTIVE | Noted: 2019-03-08

## 2019-03-08 PROBLEM — G89.29 CHRONIC BACK PAIN: Status: ACTIVE | Noted: 2019-03-08

## 2019-03-08 PROBLEM — M54.6 CHRONIC RIGHT-SIDED THORACIC BACK PAIN: Status: ACTIVE | Noted: 2018-08-16

## 2019-03-08 PROBLEM — M47.816 LUMBAR SPONDYLOSIS: Status: ACTIVE | Noted: 2018-02-27

## 2019-03-08 PROBLEM — F32.9 DEPRESSION, MAJOR: Status: ACTIVE | Noted: 2019-03-08

## 2019-03-08 PROBLEM — Z98.1 HISTORY OF LUMBAR FUSION: Status: ACTIVE | Noted: 2017-07-10

## 2019-03-08 PROBLEM — G89.29 CHRONIC BILATERAL LOW BACK PAIN WITH SCIATICA: Status: ACTIVE | Noted: 2017-07-10

## 2019-03-08 PROBLEM — M51.369 DEGENERATIVE DISC DISEASE, LUMBAR: Status: ACTIVE | Noted: 2017-07-10

## 2019-03-08 PROBLEM — G89.29 CHRONIC LEG PAIN: Status: ACTIVE | Noted: 2019-03-08

## 2019-03-08 PROBLEM — F41.9 ANXIETY: Status: ACTIVE | Noted: 2019-03-08

## 2019-03-14 ENCOUNTER — TELEPHONE (OUTPATIENT)
Dept: ORTHOPEDICS | Facility: CLINIC | Age: 59
End: 2019-03-14

## 2019-03-14 NOTE — TELEPHONE ENCOUNTER
The proposed surgery for this patient was denied by his insurance company.  The purpose of this note is to document the circumstances of our appeal.    This patient has had multiple previous spinal operations, culminating most recently in an T10 to the pelvis operation by an outside surgeon.  Unfortunately this surgery by the most recent surgeon was multiply complicated.  They had misplaced hardware which resulted in neurologic injury, as well as infection, and most severely the patient was fused in a position of positive sagittal deformity.  This deformity has resulted in severe sagittal imbalance.  On the patient's standing radiographs dated October 12, 2018 They have 16 cm of positive sagittal imbalance based on a C7 soraya line.    Positive sagittal imbalance is a well known and well except a  of disability, with multiple publications showing that this magnitude of disability approaches the disability seen by patient's on dialysis or those requiring treatment for cancer.  So it has a major impact on the patient's quality of life.  In support of this, his January 2019 ANNITA was 46.67, which correlates with a severe level of disability. Furthermore, the ODG guidelines note that sagittal imbalance is an accepted indication for surgery.  So, the very guidelines which the reviewer cites here do in fact accept this indication.    The letter sent by the insurance company lists a number of conditions which the patient does not have.  This is fine.  He does not need to have multiple reasons for surgery.  He only needs to have one valid reason, and in this case the valid reason is his positive sagittal imbalance which is again a well accepted indication for surgery, and is accepted by the ODG guidelines.  Thus, the fact that the letter states that he does not have a tumor or spondylolisthesis or infection etc., etc., is totally irrelevant, because none of those things have anything to do with the reason we would be  operating on this patient.    Letter also states that removal of the hardware is not indicated.  This is a rediculous statement and indicates that the reviewer has no knowledge of how this procedure is performed.  Revising the patient's previous fusion will require taking out the hardware, because in order to correct sagittal imbalance we will have to do an osteotomy.  The osteotomy can only be done once the hardware has been taken out.  Once it is taken out, and after the osteotomy is complete, we will then put it back in, which is the reason the surgical indication states removal and then reinsertion of instrumentation.  So yes, while taking out the hardware alone would not be indicated, it is absolutely indicated as a part of the entire procedure.  Thus, the reviewer for this case cannot look at the billing codes in isolation, but needs to think about the entire operation, and all of the steps that would be involved and necessary for completing that operation.  Removal and then reinsertion of the instrumentation would be the initial and final steps of this complicated procedure, and would absolutely be necessary.    Letter states that the patient has nonspecific back pain and therefore he is not a candidate for surgery.  This is totally false.  The patient has severe positive sagittal imbalance, which as noted above is a clear indication for surgery and a major  of back pain.  Although he had a previous spine surgeon who did surgery on him, and I agree that those previous surgeries did not help him, it is because those previous surgeries were done in a technically in-adequate way.  Just because those previous surgeries were done poorly, does not preclude the possibility of a well done surgery actually helping him.  The surgery we are proposing for him is a well thought out, well indicated procedure with substantial support in the literature.  It is being offered to correct the deficiencies that are in place  because of the previous operations.  So again, just because the previous surgeries did not help him, that fact alone does not preclude this surgery being successful.    The letter then makes note of a survey of surgeons who are asked about fusions for low nonspecific back pain.  This is totally irrelevant.  As I noted above, this patient does not have nonspecific low back pain.  The patient has a deformity, and again deformity is a well accepted indication.  So, yes, it is true that surgeons do not recommend fusions for nonspecific pain, but that statement has absolutely no bearing for this patient, who in fact has a severe deformity, which is a clear pain generator.     The letter notes that the patient has a BMI over 35.  It is true that he is obese, but this is not a contraindication to surgery.  The patient cannot lose weight because of his severe deformity and disability and he cannot be active in a way that would be necessary for him to burn off calories.  Our institution considers a BMI of 40 to be an acceptable cutoff.  This patient has a BMI of 38, which is within well accepted regional and national norms for this type of operation.     So, in summary, the letter sent by the insurance reviewer makes absolutely no sense.  It indicates a lack of understanding of this patient's condition.  It references many conditions which the patient does not have and which have no applicability to the current situation.  The indication for surgery in this case is positive sagittal imbalance, which will require an initial removal of the prior instrumentation to allow access to the spine, then a pedicle subtraction osteotomy with navigation guidance to help guide the deformity correction, and then finally reinsertion of the instrumentation and a revision fusion to try to get it to heal.  This would all be done in the same operation.      I hope this clarifies the indications for surgery.    Salazar Sebastian MD

## 2019-03-14 NOTE — TELEPHONE ENCOUNTER
Dr Sebastian sent message that a urine nicotine study is needed as evidence that he is off nicotine.  Pt was phoned and voicemail left that the order has been entered.  He can call RN directly with fax where the order can be sent.  Pt was also advised to call us if he has had the nicotine screen already done. We will need the lab results to submit to insurance regarding spine surgery.  Namita Flowers RN  [phone]

## 2019-03-18 ENCOUNTER — TELEPHONE (OUTPATIENT)
Dept: ORTHOPEDICS | Facility: CLINIC | Age: 59
End: 2019-03-18

## 2019-03-19 NOTE — TELEPHONE ENCOUNTER
Pt phoned back to RN and left voicemail asking us to call him regarding the fax for his primary MD.  Pt was phoned by RN and his home number had no voicemail.  Voicemail was left on pt's mobile phone with message to have nicotine screen results faxed to our clinic [phone].  He as also asked to leave fax number of his primary clinic if the urine nicotine screen order is necessary to come from Dr Sebastian.  Namita Flowers RN

## 2019-03-26 ENCOUNTER — TELEPHONE (OUTPATIENT)
Dept: ORTHOPEDICS | Facility: CLINIC | Age: 59
End: 2019-03-26

## 2019-03-27 NOTE — TELEPHONE ENCOUNTER
John is planning lumbar fusion with Dr Sebastian.  Pt called clinic and left voicemail that he had a nicotine screen done at his local clinic on 3/22/19.  Pt states the clinic was to send Dr Sebastian the results.  The lab results have not arrived to our clinic fax.  We will continue to watch for the results and get back to John when they have been received.  Namita Flowers RN

## 2019-04-02 ENCOUNTER — DOCUMENTATION ONLY (OUTPATIENT)
Dept: CARE COORDINATION | Facility: CLINIC | Age: 59
End: 2019-04-02

## 2019-04-05 ENCOUNTER — TELEPHONE (OUTPATIENT)
Dept: ORTHOPEDICS | Facility: CLINIC | Age: 59
End: 2019-04-05

## 2019-04-05 DIAGNOSIS — M54.16 LUMBAR RADICULOPATHY: Primary | ICD-10-CM

## 2019-04-05 NOTE — TELEPHONE ENCOUNTER
"John is awaiting Work Comp approval for spine surgery.  Urine Nicotine screen was done at his local lab in [place] phone [phone], fax [phone].  The Urine Nicotine done 3/22/19 shows \"Consistent with use of a nicotine-containing product within 1 week of specimen collection.\"  Pt states it has been about two months since he smoked, and he denies breathing second hand smoke.  Pt was advised to repeat the urine nicotine screen and have results faxed to Dr Sebastian.  Pt agreed to the plan and an order was faxed to the above lab fax number.  Namita Flowers RN  "

## 2019-04-10 ENCOUNTER — TRANSFERRED RECORDS (OUTPATIENT)
Dept: HEALTH INFORMATION MANAGEMENT | Facility: CLINIC | Age: 59
End: 2019-04-10

## 2019-04-15 ENCOUNTER — PRE VISIT (OUTPATIENT)
Dept: SURGERY | Facility: CLINIC | Age: 59
End: 2019-04-15

## 2019-04-15 ENCOUNTER — PRENATAL OFFICE VISIT (OUTPATIENT)
Dept: SURGERY | Facility: CLINIC | Age: 59
End: 2019-04-15

## 2019-04-15 NOTE — TELEPHONE ENCOUNTER
FUTURE VISIT INFORMATION      SURGERY INFORMATION:  WC- Revision T10-Pelvis Posterior Instrumented fusion with O-Arm/Stealth, BMP, L4 Pedicle Subtraction Osteotomy, removal of prior instrumentation. Dr. Romulo CRAWFORD  RECORDS REQUESTED FROM:       Primary Care Provider:    Pertinent Medical History: Chronic kidney disease    Most recent EKG+ [id number]

## 2019-04-18 ENCOUNTER — TELEPHONE (OUTPATIENT)
Dept: ORTHOPEDICS | Facility: CLINIC | Age: 59
End: 2019-04-18

## 2019-04-18 NOTE — TELEPHONE ENCOUNTER
Left VM for patient to call back [phone] to clarify if appointments for 4/19 need to be canceled or rescheduled due to work comp denining case.

## 2019-04-18 NOTE — TELEPHONE ENCOUNTER
Lab results were received from[facility] In [facility], specimen obtained 4/10/19 showing NEGATIVE nicotine screen.  Lab report was scanned into system and Dr Sebastian and surgery scheduler Costa were informed.  Namita Flowers RN

## 2019-05-06 ENCOUNTER — TELEPHONE (OUTPATIENT)
Dept: ORTHOPEDICS | Facility: CLINIC | Age: 59
End: 2019-05-06

## 2019-05-15 ASSESSMENT — ENCOUNTER SYMPTOMS
NECK MASS: 0
SORE THROAT: 0
MYALGIAS: 1
SINUS PAIN: 0
STIFFNESS: 1
DECREASED CONCENTRATION: 1
FATIGUE: 1
HOARSE VOICE: 0
MUSCLE WEAKNESS: 1
WEIGHT GAIN: 1
DEPRESSION: 1
JOINT SWELLING: 0
INCREASED ENERGY: 1
SMELL DISTURBANCE: 0
HALLUCINATIONS: 0
WEIGHT LOSS: 0
POLYDIPSIA: 1
INSOMNIA: 1
NIGHT SWEATS: 1
TROUBLE SWALLOWING: 1
POLYPHAGIA: 1
TASTE DISTURBANCE: 0
PANIC: 0
NECK PAIN: 0
ARTHRALGIAS: 1
SINUS CONGESTION: 0
CHILLS: 0
FEVER: 0
BACK PAIN: 1
ALTERED TEMPERATURE REGULATION: 1
NERVOUS/ANXIOUS: 1
DECREASED APPETITE: 0
MUSCLE CRAMPS: 1

## 2019-05-28 ENCOUNTER — ANCILLARY PROCEDURE (OUTPATIENT)
Dept: GENERAL RADIOLOGY | Facility: CLINIC | Age: 59
End: 2019-05-28
Attending: ORTHOPAEDIC SURGERY
Payer: OTHER MISCELLANEOUS

## 2019-05-28 ENCOUNTER — ANESTHESIA EVENT (OUTPATIENT)
Dept: SURGERY | Facility: CLINIC | Age: 59
DRG: 457 | End: 2019-05-28
Payer: OTHER MISCELLANEOUS

## 2019-05-28 ENCOUNTER — OFFICE VISIT (OUTPATIENT)
Dept: SURGERY | Facility: CLINIC | Age: 59
End: 2019-05-28
Payer: OTHER MISCELLANEOUS

## 2019-05-28 ENCOUNTER — OFFICE VISIT (OUTPATIENT)
Dept: ORTHOPEDICS | Facility: CLINIC | Age: 59
End: 2019-05-28
Payer: OTHER MISCELLANEOUS

## 2019-05-28 VITALS
TEMPERATURE: 98 F | HEIGHT: 70 IN | OXYGEN SATURATION: 93 % | SYSTOLIC BLOOD PRESSURE: 134 MMHG | DIASTOLIC BLOOD PRESSURE: 85 MMHG | WEIGHT: 279 LBS | BODY MASS INDEX: 39.94 KG/M2 | HEART RATE: 73 BPM

## 2019-05-28 DIAGNOSIS — Z01.818 PREOP EXAMINATION: ICD-10-CM

## 2019-05-28 DIAGNOSIS — M54.16 LUMBAR RADICULOPATHY: Primary | ICD-10-CM

## 2019-05-28 DIAGNOSIS — M54.16 LUMBAR RADICULOPATHY: ICD-10-CM

## 2019-05-28 DIAGNOSIS — Z01.818 PREOP EXAMINATION: Primary | ICD-10-CM

## 2019-05-28 DIAGNOSIS — M40.36 FLATBACK SYNDROME OF LUMBAR REGION: ICD-10-CM

## 2019-05-28 LAB
ALBUMIN SERPL-MCNC: 3.7 G/DL (ref 3.4–5)
ANION GAP SERPL CALCULATED.3IONS-SCNC: 6 MMOL/L (ref 3–14)
BUN SERPL-MCNC: 16 MG/DL (ref 7–30)
CALCIUM SERPL-MCNC: 9 MG/DL (ref 8.5–10.1)
CHLORIDE SERPL-SCNC: 106 MMOL/L (ref 94–109)
CO2 SERPL-SCNC: 27 MMOL/L (ref 20–32)
CREAT SERPL-MCNC: 1.3 MG/DL (ref 0.66–1.25)
ERYTHROCYTE [DISTWIDTH] IN BLOOD BY AUTOMATED COUNT: 13.1 % (ref 10–15)
GFR SERPL CREATININE-BSD FRML MDRD: 60 ML/MIN/{1.73_M2}
GLUCOSE SERPL-MCNC: 109 MG/DL (ref 70–99)
HBA1C MFR BLD: 6.2 % (ref 0–5.6)
HCT VFR BLD AUTO: 42.9 % (ref 40–53)
HGB BLD-MCNC: 14 G/DL (ref 13.3–17.7)
MCH RBC QN AUTO: 29.1 PG (ref 26.5–33)
MCHC RBC AUTO-ENTMCNC: 32.6 G/DL (ref 31.5–36.5)
MCV RBC AUTO: 89 FL (ref 78–100)
MRSA DNA SPEC QL NAA+PROBE: NEGATIVE
PLATELET # BLD AUTO: 150 10E9/L (ref 150–450)
POTASSIUM SERPL-SCNC: 4.1 MMOL/L (ref 3.4–5.3)
RBC # BLD AUTO: 4.81 10E12/L (ref 4.4–5.9)
SODIUM SERPL-SCNC: 139 MMOL/L (ref 133–144)
SPECIMEN SOURCE: NORMAL
WBC # BLD AUTO: 7.1 10E9/L (ref 4–11)

## 2019-05-28 RX ORDER — SODIUM CHLORIDE, SODIUM LACTATE, POTASSIUM CHLORIDE, CALCIUM CHLORIDE 600; 310; 30; 20 MG/100ML; MG/100ML; MG/100ML; MG/100ML
1-3 INJECTION, SOLUTION INTRAVENOUS CONTINUOUS
Status: CANCELLED | OUTPATIENT
Start: 2019-05-28

## 2019-05-28 RX ORDER — PROPOFOL 10 MG/ML
25-150 INJECTION, EMULSION INTRAVENOUS CONTINUOUS
Status: CANCELLED | OUTPATIENT
Start: 2019-05-28

## 2019-05-28 RX ORDER — GABAPENTIN 300 MG/1
300 CAPSULE ORAL ONCE
Status: CANCELLED | OUTPATIENT
Start: 2019-05-28 | End: 2019-05-28

## 2019-05-28 RX ORDER — ALBUTEROL SULFATE 0.83 MG/ML
2.5 SOLUTION RESPIRATORY (INHALATION) ONCE
Status: CANCELLED | OUTPATIENT
Start: 2019-05-28 | End: 2019-05-28

## 2019-05-28 RX ORDER — SODIUM CHLORIDE, SODIUM GLUCONATE, SODIUM ACETATE, POTASSIUM CHLORIDE AND MAGNESIUM CHLORIDE 526; 502; 368; 37; 30 MG/100ML; MG/100ML; MG/100ML; MG/100ML; MG/100ML
1-3 INJECTION, SOLUTION INTRAVENOUS CONTINUOUS
Status: CANCELLED | OUTPATIENT
Start: 2019-05-28

## 2019-05-28 RX ORDER — ACETAMINOPHEN 325 MG/1
975 TABLET ORAL ONCE
Status: CANCELLED | OUTPATIENT
Start: 2019-05-28 | End: 2019-05-28

## 2019-05-28 RX ORDER — MAGNESIUM SULFATE HEPTAHYDRATE 40 MG/ML
2 INJECTION, SOLUTION INTRAVENOUS ONCE
Status: CANCELLED | OUTPATIENT
Start: 2019-05-28 | End: 2019-05-28

## 2019-05-28 SDOH — HEALTH STABILITY: MENTAL HEALTH: HOW OFTEN DO YOU HAVE 6 OR MORE DRINKS ON ONE OCCASION?: LESS THAN MONTHLY

## 2019-05-28 ASSESSMENT — MIFFLIN-ST. JEOR: SCORE: 2086.79

## 2019-05-28 ASSESSMENT — LIFESTYLE VARIABLES: TOBACCO_USE: 1

## 2019-05-28 ASSESSMENT — PAIN SCALES - GENERAL: PAINLEVEL: EXTREME PAIN (8)

## 2019-05-28 NOTE — PATIENT INSTRUCTIONS
Preparing for Your Surgery      Name:  John Olvera   MRN:  [id number]   :  1960   Today's Date:  2019     Arriving for surgery:  Surgery date:  6/10/19  Arrival time:  5:30AM  Please come to:     [facility] [address]  [address] [place]  [address]    - parking is available in front of [facility] from 5:15AM to 8:00PM. If you prefer, park your car in the Green Lot.    -Proceed to the 3rd floor, check in at the Adult Surgery Waiting Lounge. [phone]    If an escort is needed stop at the Information Desk in the lobby. Inform the information person that you are here for surgery. An escort to the Adult Surgery Waiting Lounge will be provided.        What can I eat or drink?  -  You may have solid food or milk products until 8 hours prior to your surgery. 19, 11PM  -  You may have water, apple juice or 7up/Sprite until 2 hours prior to your surgery. 6/10/19, 5AM    Which medicines can I take?  Stop Aspirin, vitamins and supplements one week prior to surgery.  Hold Ibuprofen for 24 hours and/or Naproxen for 48 hours prior to surgery.   -  Do NOT take these medications in the morning, the day of surgery:    Vitamin D      -  Please take these medications the day of surgery:    Escitalopram(Lexapro)  Gabapentin(Neurontin)   Hydromorphone(Dilaudid)  Omeprazole(Prilosec)   Promethazine(Phenergan)     Tizanidine as needed    How do I prepare myself?  -  Take two showers: one the night before surgery; and one the morning of surgery.         Use Scrubcare or Hibiclens to wash from neck down.  You may use your own shampoo and conditioner. No other hair products.   -  Do NOT use lotion, powder, deodorant, or antiperspirant the day of your surgery.  -  Do NOT wear jewelry.  -  Begin using Incentive Spirometer 1 week prior to surgery.  Use 4 times per day, up to 5 breaths each time.  Bring Incentive Spirometer to hospital.  - Do not bring your own  medications to the hospital, except for inhalers and eye drops.  -  Bring your ID and insurance card.    Questions or Concerns:  -If you have questions or concerns regarding the day of surgery, please call [phone].     -For questions after surgery please call your surgeons office.           Enhanced Recovery After Surgery     This is a team effort, including you, to get you back on your feet, eating and drinking normally and out of the hospital as quickly as possible.  The goals are: 1) NO INFECTIONS and   2) RETURN TO NORMAL DIET    How can we achieve these goals?  1) STAY ACTIVE: Walk every day before your surgery; try to increase the amount every day.  Walk after surgery as much as you can-the nurses will help you.  Walking speeds healing and gets you home quicker, you heal better at home and have less risk of infection.     2) INCENTIVE SPIROMETER: Practice your incentive spirometer 4 times per day with 5 repetitions each time.  Using the incentive spirometer can strengthen your muscles between your ribs and help you have a strong cough after surgery.  A more effective cough can help prevent problems with your lungs.    3) STAY HYDRATED: Drink clear liquids up until 2 hours before your surgery. We would like you to purchase a drink such as Gatorade or Ensure Clear (not the milkshake type).  Drink this before bedtime and on the way into the hospital, drink between 8-10 ounces or until you feel hydrated.  Keeping well hydrated leads to your veins being plump, you wake up faster, and you are less likely to be nauseated. Start drinking water as soon as you can after surgery and advance to clear liquids and food as tolerated.  IV fluids contain salt, drinking fluids will minimize the amount of IV fluids you need and decrease the amount of salt you get.    The most common reason for the patient to be readmitted is dehydration. Staying hydrated after you go home from the hospital is very important.  Ensure or  Ensure Clear are good options to keep you hydrated.     4) PAIN MANAGEMENT: If we minimize the amount of opioids and narcotics, and use regional blocks (which numb the area where your surgery is) along with oral pain medications; you will have less side effects of nausea and constipation. Narcotics can slow down your bowels and cause you to stay in the hospital longer.     Our goal is to keep you comfortable; eating and drinking normally and back home safely.     AFTER YOUR SURGERY  Breathing exercises   Breathing exercises help you recover faster. Take deep breaths and let the air out slowly. This will:     Help you wake up after surgery.    Help prevent complications like pneumonia.  Preventing complications will help you go home sooner.   We may give you a breathing device (incentive spirometer) to encourage you to breathe deeply.   Nausea and vomiting   You may feel sick to your stomach after surgery; if so, let your nurse know.    Pain control:  After surgery, you may have pain. Our goal is to help you manage your pain. Pain medicine will help you feel comfortable enough to do activities that will help you heal.  These activities may include breathing exercises, walking and physical therapy.   To help your health care team treat your pain we will ask: 1) If you have pain  2) where it is located 3) describe your pain in your words  Methods of pain control include medications given by mouth, vein or by nerve block for some surgeries.  Sequential Compression Device (SCD) or Pneumo Boots:  You may need to wear SCD S on your legs or feet. These are wraps connected to a machine that pumps in air and releases it. The repeated pumping helps prevent blood clots from forming.     Using an Incentive Spirometer    An incentive spirometer is a device that helps you do deep breathing exercises. These exercises expand your lungs, aid in circulation, and help prevent pneumonia. Deep breathing exercises also help you breathe  better and improve the function of your lungs by:    Keeping your lungs clear    Strengthening your breathing muscles    Helping prevent respiratory complications or problems  The incentive spirometer gives you a way to take an active part in your care. A nurse or therapist will teach you breathing exercises. To do these exercises, you will breathe in through your mouth and not your nose. The incentive spirometer only works correctly if you breathe in through your mouth.    Steps to clear lungs  Step 1. Exhale normally. Then, inhale normally.    Relax and breathe out.  Step 2. Place your lips tightly around the mouthpiece.    Make sure the device is upright and not tilted.  Step 3. Inhale as much air as you can through the mouthpiece (don't breath through your nose).    Inhale slowly and deeply.    Hold your breath long enough to keep the balls or disk raised for at least 3 to 5 seconds, or as instructed by your healthcare provider.  Step 4. Repeat the exercise regularly.  Begin using the Incentive Spirometer one week prior to your surgery, 4 times per day-5 times each.

## 2019-05-28 NOTE — H&P
Pre-Operative H & P     CC:  Preoperative exam to assess for increased cardiopulmonary risk while undergoing surgery and anesthesia.    Date of Encounter: 5/28/2019  Primary Care Physician:  Sarah Daly  Reason for Visit: Flatback syndrome of lumbar region [M40.36]    HPI  John Olvera is a 60 y/o male who presents for pre-operative H&P in preparation for Revision Thoracic 10-Pelvis Posterior Instrumented Fusion With O-Arn.Stealth, Lumbar 4 Pedicle Subtraction Osteotomy, Removal Of Prior Instrumentation with Salazar Sebastian MD, Ubaldo Orellana MD, Jose Armando Ellis MD, Dr. Burns & Dr. Zapata on 6/10/19 at  [facility] for treatment of Flatback syndrome of lumbar region.    Mr. Olvera has a history of back injury in 1999 after picking up a box of bleach containers at his job.  He has had 5 previous spinal fusion surgeries, most recently in early 2018, which was complicated by infection & persistent R sided radicular back pain.  He is opioid tolerant, on dilaudid, since about 2000.  Outpatient opioid oral morphine equivalent (OME) is currently 128 mg OME/day of Dilaudid.    His activity is limited by pain. He had been walking 2-3 miles/day in an effort to lose weight, but now is only able to walk one block or less due to increased pain. He has a 40 pk yr smoking history, quit February 2019 in anticipation of upcoming surgery.    PMH is also significant for GERD (stable on omeprazole), preDM (A1c  on 10/12/18 was 6.2), CKD (creatinine on 8/31/18 was 1.3), anxiety/depression (stable on Buspar & Lexapro).    History was obtained from patient & chart review.    Past Medical History  Past Medical History:   Diagnosis Date     Anxiety      Chronic back pain      CKD (chronic kidney disease)      Class 3 severe obesity due to excess calories with serious comorbidity and body mass index (BMI) of 40.0 to 44.9 in adult (H) 10/12/2018     Depression      GERD (gastroesophageal  "reflux disease)      Lumbar radiculopathy 10/12/2018     Prediabetes        Past Surgical History  Past Surgical History:   Procedure Laterality Date     BACK SURGERY  2015    laminectomy     BACK SURGERY      fixation kyphoplasty lumbar spine     BACK SURGERY  2001    spinal fusion     COLONOSCOPY       EPIDIDYMECTOMY  1998       Hx of Blood transfusions/reactions: no     Hx of abnormal bleeding or anti-platelet use: no    Menstrual history: No LMP for male patient.: N/A    Steroid use in the last year: no    Personal or FH with difficulty with Anesthesia:  Pt has no hx complications. His daughter has PONV & states, \"My heart stopped\" during a procedure.    Prior to Admission Medications  Current Outpatient Medications   Medication Sig Dispense Refill     BusPIRone HCl (BUSPAR PO) Take 15 mg by mouth daily (with dinner)       Escitalopram Oxalate (LEXAPRO PO) Take 20 mg by mouth daily       gabapentin (NEURONTIN) 800 MG tablet Take 800 mg by mouth 4 times daily       HYDROmorphone HCl (DILAUDID PO) Take 8 mg by mouth 4 times daily       ibuprofen (ADVIL/MOTRIN) 200 MG tablet Take 400 mg by mouth every 8 hours as needed for mild pain       OMEPRAZOLE PO Take 20 mg by mouth every morning       promethazine (PHENERGAN) 25 MG tablet Take 25 mg by mouth every morning       senna-docusate (SENOKOT-S;PERICOLACE) 8.6-50 MG per tablet Take 1 tablet by mouth 4 times daily as needed for constipation       tiZANidine (ZANAFLEX) 4 MG tablet Take 4 mg by mouth 2 times daily as needed        TRAZODONE HCL PO Take 150 mg by mouth At Bedtime       VITAMIN D, CHOLECALCIFEROL, PO Take 5,000 Units by mouth daily       Zolpidem Tartrate (AMBIEN CR PO) Take 12.5 mg by mouth At Bedtime         Allergies  Allergies   Allergen Reactions     Diagnostic X-Ray Materials Nausea and Vomiting     Patient vomits every time he has the contrast. Has tried medication for nausea and it did not work     Silver Nitrate Rash       Social History  Social " History     Socioeconomic History     Marital status:      Spouse name: Not on file     Number of children: Not on file     Years of education: Not on file     Highest education level: Not on file   Occupational History     Not on file   Social Needs     Financial resource strain: Not on file     Food insecurity:     Worry: Not on file     Inability: Not on file     Transportation needs:     Medical: Not on file     Non-medical: Not on file   Tobacco Use     Smoking status: Former Smoker     Packs/day: 1.00     Years: 40.00     Pack years: 40.00     Last attempt to quit: 2019     Years since quittin.3     Smokeless tobacco: Never Used     Tobacco comment: Quit 2019   Substance and Sexual Activity     Alcohol use: Yes     Binge frequency: Less than monthly     Drug use: No     Sexual activity: Not on file   Lifestyle     Physical activity:     Days per week: Not on file     Minutes per session: Not on file     Stress: Not on file   Relationships     Social connections:     Talks on phone: Not on file     Gets together: Not on file     Attends [employer] service: Not on file     Active member of club or organization: Not on file     Attends meetings of clubs or organizations: Not on file     Relationship status: Not on file     Intimate partner violence:     Fear of current or ex partner: Not on file     Emotionally abused: Not on file     Physically abused: Not on file     Forced sexual activity: Not on file   Other Topics Concern     Not on file   Social History Narrative     Not on file       Family History  Family History   Problem Relation Age of Onset     Pancreatic Cancer Father      Bladder Cancer Brother      Anesthesia Reaction Daughter         PONV     Deep Vein Thrombosis (DVT) No family hx of        ROS/MED HX  The complete review of systems is negative other than noted in the HPI or here.  Patient denies recent illness, fever and respiratory infection during past month.  Pt denies steroid  use during past year.    ENT/Pulmonary:     (+)tobacco use, Past use 40 pk yr hx, quit 2/2019 packs/day  , . .    Neurologic:  - neg neurologic ROS     Cardiovascular:     (+) ----. : . . . :. . Previous cardiac testing date:results:date: results: date: results:Cath date: 2014 results:[place], symptoms felt to be anxiety          METS/Exercise Tolerance:  1, able to walk 1/2 to 1 block, activity limited by severe back pain. Was walking 2-3 miles/day in Jan/Feb 2019, but now unable to do so. Denies CP.   Hematologic:  - neg hematologic  ROS       Musculoskeletal: Comment: S/P T10-pelvis fusion w/ lumbar radiculopathy        GI/Hepatic: Comment: Taking omeprazole    (+) GERD Asymptomatic on medication,       Renal/Genitourinary: Comment: Creatinine 1.3 on 8/31/18        Endo: Comment: A1c on 10/12/18 was 6.2    (+) Obesity, .      Psychiatric:     (+) psychiatric history depression and anxiety (On Buspar & Lexapro)      Infectious Disease:   (+) Other Infectious Disease 3/2018 spine surgery infection, PICC x6 wks.      Malignancy:      - no malignancy   Other: Comment: Taking 128mg/OME/day of Dilaudid   (+) No chance of pregnancy C-spine cleared: N/A, H/O Chronic Pain,H/O chronic opiod use ,                PHYSICAL EXAM:   Mental Status/Neuro: A/A/O; Age Appropriate   Airway: Facies: Beard (Neck extension minimal due to severe pain)  Mallampati: II  Mouth/Opening: Full  TM distance: > 6 cm  Neck ROM: Immobile   Respiratory: Auscultation: CTAB     Resp. Rate: Normal     Resp. Effort: Normal      CV: Rhythm: Regular  Rate: Age appropriate  Heart: Normal Sounds   Comments:      Dental: Normal                Preop Vitals  BP Readings from Last 3 Encounters:   05/28/19 134/85   01/11/19 131/76    Pulse Readings from Last 3 Encounters:   05/28/19 73   01/11/19 78      Resp Readings from Last 3 Encounters:   No data found for Resp    SpO2 Readings from Last 3 Encounters:   05/28/19 93%   01/11/19 95%      Temp Readings  "from Last 1 Encounters:   05/28/19 98  F (36.7  C) (Oral)    Ht Readings from Last 1 Encounters:   05/28/19 1.778 m (5' 10\")      Wt Readings from Last 1 Encounters:   05/28/19 126.6 kg (279 lb)    Estimated body mass index is 40.03 kg/m  as calculated from the following:    Height as of this encounter: 1.778 m (5' 10\").    Weight as of this encounter: 126.6 kg (279 lb).     Temp: 98  F (36.7  C) Temp src: Oral BP: 134/85 Pulse: 73     SpO2: 93 %         279 lbs 0 oz  5' 10\"   Body mass index is 40.03 kg/m .    Physical Exam  Constitutional: Awake, alert, cooperative, and appears stated age. Seated upright in chair, slightly leaning forward, bracing self on arm rest w/ elbow.  Eyes: Pupils equal, round and reactive to light, extra ocular muscles intact, sclera clear, conjunctiva normal.  HENT: Normocephalic, oral pharynx with moist mucus membranes, good dentition. No goiter appreciated. No removable dental hardware.  Respiratory: Clear to auscultation bilaterally, no crackles or wheezing. BS somewhat distant.  Cardiovascular: Regular rate and rhythm, normal S1 and S2, and no murmur noted.  Carotids +2, no bruits. No edema. Palpable pulses to radial, DP and PT arteries.   GI: Normal bowel sounds, soft, obese, non-tender, no masses palpated.   Lymph/Hematologic: No cervical lymphadenopathy and no supraclavicular lymphadenopathy. Pt winces in pain w/ light palpation of cervical nodes, stating \"it's nerve pain.\"  Genitourinary:  deferred  Skin: Warm and dry.  No rashes at anticipated surgical site. Prior surgical scar well healed.  Musculoskeletal: No extension ROM of neck, normal flexion ROM. There is no redness, warmth, or swelling of the joints. Gross motor strength is normal.    Neurologic: Awake, alert, oriented to name, place and time. Cranial nerves II-XII are grossly intact. Gait is antalgic. Unable to lie supine.  Neuropsychiatric: Calm, cooperative. Normal affect. Pleasant. Answers questions appropriately, " follows commands w/o difficulty.    PRIOR LABS/DIAGNOSTIC STUDIES:  All labs and imaging personally reviewed    Xray spine complete 10/12/18:     Impression:  1. Postsurgical changes of fusion instrumentation at T10 to S1.  Hardware grossly intact. Compression deformity L2 with cement which is  unchanged.  2. Very positive global sagittal imbalance.     CT Thoracic spine 11/27/18:  Impression:   1. Postoperative changes of lower thoracic and lumbar spine with pedicle screws and stabilization rods in place.  2. No high-grade central canal nor neural foraminal stenosis.  3. Previously described minimal compression fractures are stable.  4. Degenerative changes are noted.     CT Lumbar spine 11/17/18:  Impression:  1. No high-grade central canal nor neural foraminal stenosis is apparent.  2. There are degenerative changes present, most prominent at L2-L3.  3. T12 to S1 fusion via pedicle screws and stabilization rods.     MRI thoracic spine 8/9/18:  Impression: T9 and T10 interspace significant neural foraminal narrowing.     Labs today: (personally reviewed)  BMP, CBC, A1c, albumin, T&S, MRSA    Outside records reviewed from: Care Everywhere    ASSESSMENT and PLAN  John Olvera is a 59 year old male scheduled to undergo Revision Thoracic 10-Pelvis Posterior Instrumented Fusion With O-Arn.Stealth, Lumbar 4 Pedicle Subtraction Osteotomy, Removal Of Prior Instrumentation with Salazar Sebastian MD, Ubaldo Orellana MD, Jose Armando Ellis MD, Dr. Burns & Dr. Zapata on 6/10/19 at  [facility] for treatment of Flatback syndrome of lumbar region.     He has the following specific operative considerations:      - Pt has had prior anesthetic. Type: Regional and General - no complications  - Anesthesia considerations:  Refer to PAC assessment in anesthesia records  - Risk of PONV score = 1.  If > 2, anti-emetic intervention recommended.  - Morphine equivalent = 128mg/OME/day of Dilaudid (if  > 60mg/24 hr--> needs pain team consult)    CARDIAC: METS 1,able to walk 1/2 to 1 block, activity limited by severe back pain. Was walking 2-3 miles/day in Jan/Feb 2019, but now unable to do so. Denies CP.       RCRI : No serious cardiac risks.  0.4% risk of major adverse cardiac event.     PULMONARY: NICOLE # of risks 4/8 = intermediate risk     Former smoker, 40 pk yr hx, quit 2/2019     No asthma or inhaler use    GI: GERD, taking omeprazole    RENAL: CKD per chart review (creatinine on 8/31/18 was 1.3). Will recheck today.    ENDO: BMI 40     PreDM, A1c on 10/12/18 was 6.2. Will recheck today.     Obesity: Recommend careful positioning to prevent airway/ventilatory compromise, or tissue injury.     HEME: VTE risk: 1.8%       ORTHO: Minimal extension Neck ROM due to pain, no TMJ      S/P T10-pelvis fusion complicated by infection & ongoing radiculopathy.     Chronic pain: Recommend careful positioning throughout the perioperative period to prevent injury or exacerbation of pain.       Patient was discussed with Dr Ellis. Patient is optimized and is acceptable candidate for the proposed procedure, provided labs today are within acceptable range. No further diagnostic evaluation is needed.    Arrival time, NPO, shower and medication instructions provided by nursing staff today.  Preparing For Your Surgery handout given.      Lety Cadena PA-C  Preoperative Assessment Center  [facility]  Clinic and Surgery Center  Phone: [phone]  Fax: [phone]

## 2019-05-28 NOTE — PROGRESS NOTES
Preoperative Assessment Center medication history for May 28, 2019 is complete.    See Epic admission navigator for prior to admission medications.   Operating room staff will still need to confirm medications and last dose information on day of surgery.     Medication history interview sources:  Patient Interview with his medication list    Changes made to PTA medication list (reason)  Added: None    Deleted: None    Changed: None    Additional medication history information (including reliability of information, actions taken by pharmacist):    -- No recent (within 30 days) course of antibiotics  -- No recent (within 30 days) course of steroids  -- No recent (within 30 days) chronic daily medications stopped   -- Patient declines being on any other prescription or over-the-counter medications    Prior to Admission medications    Medication Sig Last Dose Taking? Auth Provider   BusPIRone HCl (BUSPAR PO) Take 15 mg by mouth daily (with dinner) Taking Yes Reported, Patient   Escitalopram Oxalate (LEXAPRO PO) Take 20 mg by mouth daily Taking Yes Reported, Patient   gabapentin (NEURONTIN) 800 MG tablet Take 800 mg by mouth 4 times daily Taking Yes Unknown, Entered By History   HYDROmorphone HCl (DILAUDID PO) Take 8 mg by mouth 4 times daily Taking Yes Reported, Patient   ibuprofen (ADVIL/MOTRIN) 200 MG tablet Take 400 mg by mouth every 8 hours as needed for mild pain Taking Yes Unknown, Entered By History   OMEPRAZOLE PO Take 20 mg by mouth every morning Taking Yes Reported, Patient   promethazine (PHENERGAN) 25 MG tablet Take 25 mg by mouth every morning Taking Yes Reported, Patient   senna-docusate (SENOKOT-S;PERICOLACE) 8.6-50 MG per tablet Take 1 tablet by mouth 4 times daily as needed for constipation Taking Yes Reported, Patient   tiZANidine (ZANAFLEX) 4 MG tablet Take 4 mg by mouth 2 times daily as needed  Taking Yes Reported, Patient   TRAZODONE HCL PO Take 150 mg by mouth At Bedtime Taking Yes Reported,  Patient   VITAMIN D, CHOLECALCIFEROL, PO Take 5,000 Units by mouth daily Taking Yes Reported, Patient   Zolpidem Tartrate (AMBIEN CR PO) Take 12.5 mg by mouth At Bedtime Taking Yes Reported, Patient         Medication history completed by: Ritchie Davis RPH

## 2019-05-28 NOTE — PHARMACY - PREOPERATIVE ASSESSMENT CENTER
PREOPERATIVE PAIN CONSULT FOR POSTOPERATIVE PAIN MANAGEMENT  John Olvera was seen and interviewed during PAC Clinic appointment on May 28, 2019 in preparation for the planned procedure with Dr. Sebastian on 6/10/19 at the [facility] for Revision Thoracic 10-Pelvis Posterior Instrumented Fusion With O-Arn.Stealth, Lumbar 4 Pedicle Subtraction Osteotomy, Removal Of Prior Instrumentation.    These recommendations are intended for patients admitted to the hospital after a procedure and are only valid for 30 days from the date of service. If there are significant changes in opioid dosing between today and day of procedure the below recommendations may have to be adjusted. Based on [place] Prescription Monitoring Profile and patient interview:     - OUTPATIENT MEDICATIONS (related to pain management):  -- Long-acting opioid: None  -- Short-acting opioid: HYDROmorphone 8mg by mouth four times daily  -- Intrathecal pump: None  -- Oral adjuvant(s): gabapentin 800mg by mouth four times daily, tizanidine 4mg by mouth twice daily prn   -- Topicals: None  -- Bowel regimen: Senokot-S, 1 tablet by mouth four times daily as needed      Verbal consent was given by patient to access pharmacy records and [place] Prescription Monitoring Profile: Yes    ASSESSMENT:    John Olvera has a history of depression and chronic back pain. He has been dealing with back pain since 1999 when he was picking up a box of bleach containers at his job. He has undergone numerous back surgeries with the most recent on in 2018. He has tried many opioids in the past and the most effective was the HYDROmorphone. Today, he rated his pain at a 8/10 on a 0-10 scale, which has been the best it's gotten since his 2018 surgery. He describes the pain as predominantly nerve pain but doesn't radiate down his legs, it stays localized to his right back. He's able to walk and tries to walk about a block each day but sometimes is unable to  do so. He is opioid tolerant, on relatively high doses of opioid analgesic at stable doses. Outpatient opioids prescribed by Dr. Ramírez, his chronic pain provider.    He denies a history of NICOLE. He drinks very little and doesn't smoke tobacco. He denies any use of illicit drugs and reports never attending chemical dependency. He sleeps on average about 6 hours/night. He has a bowel movement almost every day.     Outpatient opioid oral morphine equivalent (OME): 128mg/day     Other pain therapies tried in the past (not an all inclusive list):   PCA - patient has received HYDROmorphone PCA in the past without complication  -- oxyCODONE = ineffective  -- morphine = tolearted  -- HYDROmorphone = works best    Pain therapies never tried (not an all inclusive list):   -- Ketamine - denies seizures, uncontrolled hypertension, cardiac arrhythmias, PTSD, BPAD, schizophrenia, psychiatric disorders (eg. hallucinations/delirium), history of brain cancer, cardiac failure, previous CVA and increased IOP/glaucoma and is open to using ketamine for pain control.     RECOMMENDATIONS:   The following pain management recommendations are made based on information from today's visit and should not replace medical decision-making based on patient condition at the time of procedure or postoperatively.      - PREOPERATIVE:  -- Short acting opioid - HYDROmorphone 8mg by mouth four times daily   -- Adjuvants/nonopioid analgesics - gabapentin 800mg by mouth four times daily, tizanidine 4mg by mouth twice daily as needed muscle spasms   --  Before surgery recommend gabapentin 300 mg PO x 1 dose in pre-op area (Written in pre-op by PAC TELLO)   -- Before procedure recommend acetaminophen 975 mg PO in pre-op (Written in pre-op by PAC TELLO)    - INTRAOPERATIVE (Anesthesiologist/CRNA to consider):   -- Regional anesthesia - Defer to RAPS team   -- Ketamine IV intraoperatively  -- Avoid remifentanil - to reduce risk of developing hyperalgesia    -  POSTOPERATIVE MANAGEMENT:  Place inpatient pain management consult to assist with acute postoperative pain management.    Opioid analgesic:  + Note: if neuraxial opioid or other long acting opioid is given during procedure contact on-call pain provider for adjustment in opioid plan  + Note if regional approach includes an opioid via the epidural then defer opioid management to RAPS team.            -- HYDROmorphone (Dilaudid) PCA dose range 0.3-0.5 mg Q 10 min lockout interval with continuous rate 0.1 mg/hr.  The 0.1 mg/hr continuous rate accounts for patient's outpatient opioid use.  The PCA doses are for acute postoperative pain.  Start with 0.3mg PCA dose and increase dose by 0.1 mg every 1 hour as necessary for pain control or improvement in physical function, hold or reduce dose for sedation. Notify provider to assess for uncontrolled pain or sedation and adjust dose as necessary. (will plan to keep him on for at leaset 24 hours then transition to oral HYDROmorphone) PLEASE Verify that patient did NOT receive any long acting opioid in OR before starting a continuous rate.    -- Initiate continuous IV ketamine at 5mg/hr with low tolerance to increase to 7.5mg/hr after 4 hours if patient tolerates         -- Hold other PO and IV opioids while on PCA    Nonopioid analgesics:   + Defer use of NSAID to surgeon  + Start acetaminophen 975 mg PO every 6 hr scheduled if no concern about masking fevers  + Continue gabapentin 800 mg PO every 8 hours scheduled    Muscle Relaxant:   + tizanidine 4 mg PO Q8 hr PRN muscle spasm     Stool softeners/Laxatives:   + When appropriate start senna-docusate 1-2 tabs PO BID and Miralax 17 g daily to prevent opioid induced constipation.     Other:  + Recommend close monitoring of respiratory status postoperatively with capnography and continuous SpO2 monitoring. Would recommend continuing capnography beyond the usual 24 hr due to patient's opioid tolerance.     + If patient gets  admitted to ICU, consider Dexmedetomidine 0.2-0.7 mcg/kg/hr IV continuous infusion. Goal for this is NOT sedation. Goal is to reduce opioid tolerance/needs. Patient needs to be awake and alert enough to effectively use their PCA. This can only be initiated if patient is in the ICU setting.    + Ketamine IV infusion.  If needed for acute postop uncontrolled pain, the primary service may decide to start ketamine infusion at 5mg/hr.  Ketamine for acute pain management will be used as an analgesic medication in addition to opioids when usual analgesics are suboptimal.Only start ketamine IV if patient is stable from a cardiovascular standpoint.    If using Sigma pump to deliver: Obtain acrylic lockbox (stored in med room) and obtain portless tubing [stored in med rooms or call sterile stores and ask for people soft product number [id number] (69 inch tube) or [id number] (102 inch tube)]    Low dose ketamine may be administered on a regular nursing unit according to [facility] ketamine-low dose policy.  Please see: [email or web address]     Absolute Contraindications (do not start ketamine):     Intracranial hypertension    Uncontrolled hypertension    Known or suspected schizophrenia (even if currently stable or controlled with medications)  Relative contraindications:     Cardiac failure    Previous cerebrovascular accidents    Severe psychiatric disorders associated with hallucinations or delirium  Monitoring while on IV ketamine:    Vital signs 15 min after first dose and after each dose increase    Reassess minimally every 4 hours while patient is receiving stable (unchanging) dose of ketamine  Common adverse effects of ketamine:  Psychotomimetic effects (hallucinations or dreamlike feelings), dysphoria, anxiousness, restlessness, cognitive impairment and mild sedation are most common adverse effect at these low doses, but even this adverse event doesn t  happen all the time.  The psychomimetic effects may be reduced or prevented by co-administration of a benzodiazepine:  e.g. lorazepam or diazepam    Dose related and therefore less common at  low doses: hypertension (or hypotension), excessive sedation    Monitor for respiratory depression when given with other medications that cause sedation.   Discontinuation of ketamine:    Tapering down the dose for pain control may be needed with higher doses prior to discontinuation, however the infusion can be stopped abruptly with no formal weaning if patient is experiencing side effects      The inpatient pain service will follow up on patient after consult is placed and offer further recommendations for pain management.  If immediate assistance is needed please contact the pain service at the number below.     Ritchie Davis RPH  May 28, 2019  11:42 AM    If questions or concerns, please contact the Inpatient Pain Management Service:  Call [phone] after hours, weekends and holidays.   Page [phone] from 8 AM - 3 PM Mon - Fri.

## 2019-05-28 NOTE — PROGRESS NOTES
Spine Surgery Return Clinic Visit      Chief Complaint:   RECHECK (PAC review and discuss surgery )      Interval HPI:  Symptom Profile Including: location of symptoms, onset, severity, exacerbating/alleviating factors, previous treatments:        John Olvera is a 58 year old male who is referred to me by Dr. Zuhair Cisse in [place] for evaluation of lumbar flat back with a very complicated past procedure.  He has had multiple previous lumbar operations.  Originally an L3 to the S1 level which was subsequently complicated by adjacent segment fracture which was treated with vertebroplasty.  He went on to have continuing pain and subsequently then in February 2018 had an extension of the previous fusion up to the T10 level.  Unfortunately this most recent procedure was complicated in multiple ways.  He did develop a deep infection and had 6 weeks of IV antibiotics.  Furthermore he had missed placed screws and he did have to return to the operating room for screw removal and ever since that time he has had severe right thoracic rib cage radicular complaints.     On interview today he is bothered by 2 things.  First the right thoracic rib cage pain is never gotten better.  He says it was present as soon as he woke up after the February surgery.  In addition he cannot stand up straight.  He feels like he bends forward when he walks.  He has severe low back pain from this.  He denies any radicular complaints into the legs.     He is tried multiple oral medications including gabapentin anti-inflammatories and is been doing some physical therapy.  He thinks he is gained 30-40 pounds since the surgery because of inactivity.  He has started smoking again.     I last saw him a couple months ago and sent him for nutrition counseling and smoking cessation.  He has lost 13 pounds but as of January had not quit smoking.  He did have a lumbar epidural injection which was a right-sided transforaminal injection.  I order this at  L1 and he is not sure if it was done at that level but he thinks that it was.  He says this helped him for about 12 hours and then the symptoms returned.  He is still quite bothered by the burning nerve type pain.    Since his last visit with me he reports that he quit smoking and he did complete a urine nicotine test which was negative.  He returns today for further imaging review and surgical decision making.            Past Medical History:     Past Medical History:   Diagnosis Date     Anxiety      Chronic back pain      CKD (chronic kidney disease)      Class 3 severe obesity due to excess calories with serious comorbidity and body mass index (BMI) of 40.0 to 44.9 in adult (H) 10/12/2018     Depression      GERD (gastroesophageal reflux disease)      Lumbar radiculopathy 10/12/2018     Prediabetes             Past Surgical History:     Past Surgical History:   Procedure Laterality Date     BACK SURGERY      laminectomy     BACK SURGERY      fixation kyphoplasty lumbar spine     BACK SURGERY      spinal fusion     COLONOSCOPY       EPIDIDYMECTOMY              Social History:     Social History     Tobacco Use     Smoking status: Former Smoker     Packs/day: 1.00     Years: 40.00     Pack years: 40.00     Last attempt to quit: 2019     Years since quittin.3     Smokeless tobacco: Never Used     Tobacco comment: Quit 2019   Substance Use Topics     Alcohol use: Yes     Binge frequency: Less than monthly            Family History:     Family History   Problem Relation Age of Onset     Pancreatic Cancer Father      Bladder Cancer Brother      Anesthesia Reaction Daughter         PONV     Deep Vein Thrombosis (DVT) No family hx of             Allergies:     Allergies   Allergen Reactions     Diagnostic X-Ray Materials Nausea and Vomiting     Patient vomits every time he has the contrast. Has tried medication for nausea and it did not work     Silver Nitrate Rash            Medications:      Current Outpatient Medications   Medication     BusPIRone HCl (BUSPAR PO)     Escitalopram Oxalate (LEXAPRO PO)     gabapentin (NEURONTIN) 800 MG tablet     HYDROmorphone HCl (DILAUDID PO)     ibuprofen (ADVIL/MOTRIN) 200 MG tablet     OMEPRAZOLE PO     promethazine (PHENERGAN) 25 MG tablet     senna-docusate (SENOKOT-S;PERICOLACE) 8.6-50 MG per tablet     tiZANidine (ZANAFLEX) 4 MG tablet     TRAZODONE HCL PO     VITAMIN D, CHOLECALCIFEROL, PO     Zolpidem Tartrate (AMBIEN CR PO)     No current facility-administered medications for this visit.              Review of Systems:   A focused musculoskeletal and neurologic ROS was performed with pertinent positives and negatives noted in the HPI.  Additional systems were also reviewed and are documented at the bottom of the note.         Physical Exam:   Vitals: There were no vitals taken for this visit.  Musculoskeletal, Neurologic, and Spine:     Lumbar Spine:                          Appearance - No gross stepoffs or deformities                          Motor -                           L2-3: Hip flexion 5/5 L and 5/5 R strength                           L3/4:  Knee extension L 5/5 and R 5/5 strength                          L4/5:  Foot dorsiflexion R 5/5 L 5/5 and                                       EHL dorsiflexion R 4/5 L 4/5 strength                          S1:  Plantarflexion/Peroneal Muscles  L 5/5 and R 5/5 strength                          Sensation: intact to light touch L3-S1 distribution BLE                             Neurologic:     REFLEXES Left Right                           Patella 1+ 1+   Ankle jerk 1+ 1+   Babinski No upgoing great toe No upgoing great toe   Clonus 0 beats 0 beats      Hip Exam:  No pain with hip log roll and no tenderness over the greater trochanters.     Alignment:  Patient stands with a neutral standing sagittal balance.    Neuropathic tenderness noted along right flank.             Imaging:   We ordered and independently  reviewed new radiographs at this clinic visit. The results were discussed with the patient. Findings include:      October 12, 2018 AP lateral scoliosis radiographs show evidence of previous extensive thoracolumbar fusion from T10-S1 with a multi-luis angel construct and evidence of fracture at L2 with cement augmentation.  Severe sagittal imbalance with 16 cm positive based on the C7 soraya line.  Lumbar lordosis measures 26 degrees, pelvic incidence measures 40 degrees.  Thoracic kyphosis measures 54 degrees.     August 9, 2018 thoracic level MRI shows no obvious severe stenosis.     August 9, 2018 thoracic and lumbar CT scans: Evidence of previous medially placed screws which are subsequently removed on the right at T12 and L2.  There is also a medial breach at L1 with residual screw at that level which was not removed.  Solid-appearing fusion from L3-S1 but indeterminant fusion above that.     Standing scoliosis radiographs May 28, 2019.  I ordered these to update his surgical plan before surgery.  Unchanged alignment compared to October 2018.         Assessment and Plan:      58 year old male with lumbar flat back and neuropathic type pain along the right lateral abdominal wall possibly from medial screw placement at the time of his previous surgery.    We again talked about options today.  At this point I think he has failed conservative management.  He has appropriately quit smoking.  If he were to proceed with surgery, most likely this would require a revision T11 to the pelvis with a pedicle subtraction osteotomy at L4.  I quoted him a 70% morbidity rate particularly risk of infection, neurologic injury based on the recent scoliosis 1 risk trial which might be as high as 10-15% at 12 months follow-up, as well as adjacent segment disease and other complications standard for any lumbar fusion.     Risks of this surgery include risk of infection, risk of dural tear resulting in CSF leak which might result in  headaches, or possible need for lumbar drain, or possible revision surgery in the setting of a persistent leak. Risk of hematoma or seroma resulting in wound complications.  Possible nerve root injury resulting in numbness weakness or paralysis into the arms or legs. Possible radiculitis which could result in similar symptoms or could result in significant neurogenic type pain. There is also a risk of delayed onset nerve root pals or radiculitis, which I explained is potentially due to stretch injury or possibly due to delayed onset swelling, and is sometimes temporary but can also be permanent.  Risk of incomplete decompression which might require revision surgery in the future.  Risk of adjacent segment problems requiring surgery in the future. Risk of incomplete relief of symptoms possibly requiring revision surgery in the future. Furthermore, although rare, there are risks of major vessel injury such as to the major vessels anteriorly or to the bowel from the surgery.  Sometimes this can happen if an instrument is passed anteriorly through the disc space. There is a risk of nonunion which might require revision surgery in the future, and risk of hardware complications from the instrumentation.  I do use imaging to help guide the placement of the instrumentation, but even with this there is a chance of implant malposition or problem.  There is a risk of blood clots in the legs or the lungs.  Lastly, although rare, there are certainly risks of the anesthetic including stroke heart attack and death.       I also spent time talking about the risk of spinal fluid leak and the need for lumbar drain placement given that this is a revision procedure.    We talked about the fact that it is 6-12-month recove furthermore I explained that our main goal of surgery is to help somewhat with the back pain.  He does have neuropathic pain on the right side from previous screw malposition and I am not sure that this will get  better or not.  We would certainly evaluate those screws and try to reposition any residual misplaced screws but this may not take away the right-sided flank pain.  Possibly a 1 week hospital stay in 1 or 2 nights in the ICU and certainly it is a long road in a major surgery.    He understands and he would like to proceed.  We will get this arranged for him.  I am going to do this as a co-surgeon case.    His previous implants are Globus screws and Synthes cross connectors and rods.     Respectfully,  Salazar Sebastian MD  Spine Surgery  [facility]    Answers for HPI/ROS submitted by the patient on 5/15/2019   General Symptoms: Yes  Skin Symptoms: No  HENT Symptoms: Yes  EYE SYMPTOMS: No  HEART SYMPTOMS: No  LUNG SYMPTOMS: No  INTESTINAL SYMPTOMS: No  URINARY SYMPTOMS: No  REPRODUCTIVE SYMPTOMS: No  SKELETAL SYMPTOMS: Yes  BLOOD SYMPTOMS: No  NERVOUS SYSTEM SYMPTOMS: No  MENTAL HEALTH SYMPTOMS: Yes  Fever: No  Loss of appetite: No  Weight loss: No  Weight gain: Yes  Fatigue: Yes  Night sweats: Yes  Chills: No  Increased stress: Yes  Excessive hunger: Yes  Excessive thirst: Yes  Feeling hot or cold when others believe the temperature is normal: Yes  Loss of height: No  Post-operative complications: No  Surgical site pain: No  Hallucinations: No  Change in or Loss of Energy: Yes  Hyperactivity: No  Confusion: No  Ear pain: No  Ear discharge: No  Hearing loss: No  Tinnitus: Yes  Nosebleeds: No  Congestion: No  Sinus pain: No  Trouble swallowing: Yes   Voice hoarseness: No  Mouth sores: No  Sore throat: No  Tooth pain: No  Gum tenderness: No  Bleeding gums: No  Change in taste: No  Change in sense of smell: No  Dry mouth: Yes  Hearing aid used: No  Neck lump: No  Back pain: Yes  Muscle aches: Yes  Neck pain: No  Swollen joints: No  Joint pain: Yes  Bone pain: Yes  Muscle cramps: Yes  Muscle weakness: Yes  Joint stiffness: Yes  Bone fracture: No  Nervous or Anxious: Yes  Depression: Yes  Trouble  sleeping: Yes  Trouble thinking or concentrating: Yes  Mood changes: Yes  Panic attacks: No

## 2019-05-28 NOTE — LETTER
5/28/2019       RE: John Olvera  [address] [facility] [address]     Dear Colleague,    Thank you for referring your patient, John Olvera, to the [facility]. Please see a copy of my visit note below.    Spine Surgery Return Clinic Visit      Chief Complaint:   RECHECK (PAC review and discuss surgery )      Interval HPI:  Symptom Profile Including: location of symptoms, onset, severity, exacerbating/alleviating factors, previous treatments:        John Olvera is a 58 year old male who is referred to me by Dr. Zuhair Cisse in [place] for evaluation of lumbar flat back with a very complicated past procedure.  He has had multiple previous lumbar operations.  Originally an L3 to the S1 level which was subsequently complicated by adjacent segment fracture which was treated with vertebroplasty.  He went on to have continuing pain and subsequently then in February 2018 had an extension of the previous fusion up to the T10 level.  Unfortunately this most recent procedure was complicated in multiple ways.  He did develop a deep infection and had 6 weeks of IV antibiotics.  Furthermore he had missed placed screws and he did have to return to the operating room for screw removal and ever since that time he has had severe right thoracic rib cage radicular complaints.     On interview today he is bothered by 2 things.  First the right thoracic rib cage pain is never gotten better.  He says it was present as soon as he woke up after the February surgery.  In addition he cannot stand up straight.  He feels like he bends forward when he walks.  He has severe low back pain from this.  He denies any radicular complaints into the legs.     He is tried multiple oral medications including gabapentin anti-inflammatories and is been doing some physical therapy.  He thinks he is gained 30-40 pounds since the surgery because of inactivity.  He has started smoking  again.     I last saw him a couple months ago and sent him for nutrition counseling and smoking cessation.  He has lost 13 pounds but as of January had not quit smoking.  He did have a lumbar epidural injection which was a right-sided transforaminal injection.  I order this at L1 and he is not sure if it was done at that level but he thinks that it was.  He says this helped him for about 12 hours and then the symptoms returned.  He is still quite bothered by the burning nerve type pain.    Since his last visit with me he reports that he quit smoking and he did complete a urine nicotine test which was negative.  He returns today for further imaging review and surgical decision making.            Past Medical History:     Past Medical History:   Diagnosis Date     Anxiety      Chronic back pain      CKD (chronic kidney disease)      Class 3 severe obesity due to excess calories with serious comorbidity and body mass index (BMI) of 40.0 to 44.9 in adult (H) 10/12/2018     Depression      GERD (gastroesophageal reflux disease)      Lumbar radiculopathy 10/12/2018     Prediabetes             Past Surgical History:     Past Surgical History:   Procedure Laterality Date     BACK SURGERY      laminectomy     BACK SURGERY      fixation kyphoplasty lumbar spine     BACK SURGERY      spinal fusion     COLONOSCOPY       EPIDIDYMECTOMY              Social History:     Social History     Tobacco Use     Smoking status: Former Smoker     Packs/day: 1.00     Years: 40.00     Pack years: 40.00     Last attempt to quit: 2019     Years since quittin.3     Smokeless tobacco: Never Used     Tobacco comment: Quit 2019   Substance Use Topics     Alcohol use: Yes     Binge frequency: Less than monthly            Family History:     Family History   Problem Relation Age of Onset     Pancreatic Cancer Father      Bladder Cancer Brother      Anesthesia Reaction Daughter         PONV     Deep Vein Thrombosis (DVT) No  family hx of             Allergies:     Allergies   Allergen Reactions     Diagnostic X-Ray Materials Nausea and Vomiting     Patient vomits every time he has the contrast. Has tried medication for nausea and it did not work     Silver Nitrate Rash            Medications:     Current Outpatient Medications   Medication     BusPIRone HCl (BUSPAR PO)     Escitalopram Oxalate (LEXAPRO PO)     gabapentin (NEURONTIN) 800 MG tablet     HYDROmorphone HCl (DILAUDID PO)     ibuprofen (ADVIL/MOTRIN) 200 MG tablet     OMEPRAZOLE PO     promethazine (PHENERGAN) 25 MG tablet     senna-docusate (SENOKOT-S;PERICOLACE) 8.6-50 MG per tablet     tiZANidine (ZANAFLEX) 4 MG tablet     TRAZODONE HCL PO     VITAMIN D, CHOLECALCIFEROL, PO     Zolpidem Tartrate (AMBIEN CR PO)     No current facility-administered medications for this visit.              Review of Systems:   A focused musculoskeletal and neurologic ROS was performed with pertinent positives and negatives noted in the HPI.  Additional systems were also reviewed and are documented at the bottom of the note.         Physical Exam:   Vitals: There were no vitals taken for this visit.  Musculoskeletal, Neurologic, and Spine:     Lumbar Spine:                          Appearance - No gross stepoffs or deformities                          Motor -                           L2-3: Hip flexion 5/5 L and 5/5 R strength                           L3/4:  Knee extension L 5/5 and R 5/5 strength                          L4/5:  Foot dorsiflexion R 5/5 L 5/5 and                                       EHL dorsiflexion R 4/5 L 4/5 strength                          S1:  Plantarflexion/Peroneal Muscles  L 5/5 and R 5/5 strength                          Sensation: intact to light touch L3-S1 distribution BLE                             Neurologic:     REFLEXES Left Right                           Patella 1+ 1+   Ankle jerk 1+ 1+   Babinski No upgoing great toe No upgoing great toe   Clonus 0 beats 0  beats      Hip Exam:  No pain with hip log roll and no tenderness over the greater trochanters.     Alignment:  Patient stands with a neutral standing sagittal balance.    Neuropathic tenderness noted along right flank.             Imaging:   We ordered and independently reviewed new radiographs at this clinic visit. The results were discussed with the patient. Findings include:      October 12, 2018 AP lateral scoliosis radiographs show evidence of previous extensive thoracolumbar fusion from T10-S1 with a multi-luis angel construct and evidence of fracture at L2 with cement augmentation.  Severe sagittal imbalance with 16 cm positive based on the C7 soraya line.  Lumbar lordosis measures 26 degrees, pelvic incidence measures 40 degrees.  Thoracic kyphosis measures 54 degrees.     August 9, 2018 thoracic level MRI shows no obvious severe stenosis.     August 9, 2018 thoracic and lumbar CT scans: Evidence of previous medially placed screws which are subsequently removed on the right at T12 and L2.  There is also a medial breach at L1 with residual screw at that level which was not removed.  Solid-appearing fusion from L3-S1 but indeterminant fusion above that.     Standing scoliosis radiographs May 28, 2019.  I ordered these to update his surgical plan before surgery.  Unchanged alignment compared to October 2018.         Assessment and Plan:      58 year old male with lumbar flat back and neuropathic type pain along the right lateral abdominal wall possibly from medial screw placement at the time of his previous surgery.    We again talked about options today.  At this point I think he has failed conservative management.  He has appropriately quit smoking.  If he were to proceed with surgery, most likely this would require a revision T11 to the pelvis with a pedicle subtraction osteotomy at L4.  I quoted him a 70% morbidity rate particularly risk of infection, neurologic injury based on the recent scoliosis 1 risk trial  which might be as high as 10-15% at 12 months follow-up, as well as adjacent segment disease and other complications standard for any lumbar fusion.     Risks of this surgery include risk of infection, risk of dural tear resulting in CSF leak which might result in headaches, or possible need for lumbar drain, or possible revision surgery in the setting of a persistent leak. Risk of hematoma or seroma resulting in wound complications.  Possible nerve root injury resulting in numbness weakness or paralysis into the arms or legs. Possible radiculitis which could result in similar symptoms or could result in significant neurogenic type pain. There is also a risk of delayed onset nerve root pals or radiculitis, which I explained is potentially due to stretch injury or possibly due to delayed onset swelling, and is sometimes temporary but can also be permanent.  Risk of incomplete decompression which might require revision surgery in the future.  Risk of adjacent segment problems requiring surgery in the future. Risk of incomplete relief of symptoms possibly requiring revision surgery in the future. Furthermore, although rare, there are risks of major vessel injury such as to the major vessels anteriorly or to the bowel from the surgery.  Sometimes this can happen if an instrument is passed anteriorly through the disc space. There is a risk of nonunion which might require revision surgery in the future, and risk of hardware complications from the instrumentation.  I do use imaging to help guide the placement of the instrumentation, but even with this there is a chance of implant malposition or problem.  There is a risk of blood clots in the legs or the lungs.  Lastly, although rare, there are certainly risks of the anesthetic including stroke heart attack and death.       I also spent time talking about the risk of spinal fluid leak and the need for lumbar drain placement given that this is a revision procedure.    We  talked about the fact that it is 6-12-month recove furthermore I explained that our main goal of surgery is to help somewhat with the back pain.  He does have neuropathic pain on the right side from previous screw malposition and I am not sure that this will get better or not.  We would certainly evaluate those screws and try to reposition any residual misplaced screws but this may not take away the right-sided flank pain.  Possibly a 1 week hospital stay in 1 or 2 nights in the ICU and certainly it is a long road in a major surgery.    He understands and he would like to proceed.  We will get this arranged for him.  I am going to do this as a co-surgeon case.    His previous implants are Globus screws and S[employer]s cross connectors and rods.     Respectfully,  Salazar Sebastian MD  Spine Surgery  [facility]

## 2019-05-28 NOTE — ANESTHESIA PREPROCEDURE EVALUATION
Anesthesia Pre-Procedure Evaluation    Patient: John Olvera   MRN:     [id number] Gender:   male   Age:    59 year old :      1960        Preoperative Diagnosis: Lumbar Stenosis and Neurogenic Claudication,Flatbak Syndrome Of Lumbar Region   Procedure(s):  Revision Thoracic 10-Pelvis Posterior Instrumented Fusion With O-Arn.Stealth, Lumbar 4 Pedicle Subtraction Osteotomy, Removal Of Prior Instrumentation     Past Medical History:   Diagnosis Date     Anxiety      Chronic back pain      CKD (chronic kidney disease)      Class 3 severe obesity due to excess calories with serious comorbidity and body mass index (BMI) of 40.0 to 44.9 in adult (H) 10/12/2018     Depression      GERD (gastroesophageal reflux disease)      Lumbar radiculopathy 10/12/2018     Prediabetes       Past Surgical History:   Procedure Laterality Date     BACK SURGERY      laminectomy     BACK SURGERY      fixation kyphoplasty lumbar spine     BACK SURGERY      spinal fusion     COLONOSCOPY       EPIDIDYMECTOMY            Anesthesia Evaluation     . Pt has had prior anesthetic. Type: Regional and General           ROS/MED HX    ENT/Pulmonary:     (+)tobacco use, Past use 40 pk yr hx, quit 2019 packs/day  , . .    Neurologic:  - neg neurologic ROS     Cardiovascular:     (+) ----. : . . . :. . Previous cardiac testing date:results:date: results: date: results:Cath date:  results:[facility], symptoms felt to be anxiety          METS/Exercise Tolerance: Comment: Was walking 2-3 miles/day in /Feb, but now unable due to pain. Able to walk 1/2-1 block. Denies CP. 1 - Eating, dressing   Hematologic:  - neg hematologic  ROS       Musculoskeletal: Comment: S/P T10-pelvis fusion w/ lumbar radiculopathy        GI/Hepatic: Comment: Taking omeprazole    (+) GERD Asymptomatic on medication,       Renal/Genitourinary: Comment: Creatinine 1.3 on 18        Endo: Comment: A1c on 10/12/18 was 6.2    (+) Obesity, .      Psychiatric:  "    (+) psychiatric history depression and anxiety (On Buspar & Lexapro)      Infectious Disease:   (+) Other Infectious Disease 3/2018 spine surgery infection, PICC x6 wks.      Malignancy:      - no malignancy   Other: Comment: Taking 128mg/OME/day of Dilaudid   (+) No chance of pregnancy C-spine cleared: N/A, H/O Chronic Pain,H/O chronic opiod use ,                        PHYSICAL EXAM:   Mental Status/Neuro: A/A/O; Age Appropriate   Airway: Facies: Beard (Neck extension minimal due to severe pain)  Mallampati: II  Mouth/Opening: Full  TM distance: > 6 cm  Neck ROM: Immobile   Respiratory: Auscultation: CTAB     Resp. Rate: Normal     Resp. Effort: Normal      CV: Rhythm: Regular  Rate: Age appropriate  Heart: Normal Sounds   Comments:      Dental: Normal                  Lab Results   Component Value Date    ALBUMIN 3.4 10/12/2018       Preop Vitals  BP Readings from Last 3 Encounters:   05/28/19 134/85   01/11/19 131/76    Pulse Readings from Last 3 Encounters:   05/28/19 73   01/11/19 78      Resp Readings from Last 3 Encounters:   No data found for Resp    SpO2 Readings from Last 3 Encounters:   05/28/19 93%   01/11/19 95%      Temp Readings from Last 1 Encounters:   05/28/19 98  F (36.7  C) (Oral)    Ht Readings from Last 1 Encounters:   05/28/19 1.778 m (5' 10\")      Wt Readings from Last 1 Encounters:   05/28/19 126.6 kg (279 lb)    Estimated body mass index is 40.03 kg/m  as calculated from the following:    Height as of this encounter: 1.778 m (5' 10\").    Weight as of this encounter: 126.6 kg (279 lb).     LDA:            Assessment:   ASA SCORE: 3    NPO Status: > 6 hours since completed [employer]   Documentation: H&P complete; Preop Testing complete; Consents complete   Proceeding: Proceed without further delay  Tobacco Use:  NO Active use of Tobacco/UNKNOWN Tobacco use status     Plan:   Anes. Type:  General   Pre-Induction: Acetaminophen PO; Gabapentin PO   Induction:  IV (Standard)   Airway: Oral " ETT; CMAC/VL   Access/Monitoring: PIV; 2nd PIV; A-Line   Maintenance: Balanced   Emergence: Procedure Site   Logistics: Same Day Surgery     Postop Pain/Sedation Strategy:  Standard-Options: Opioids PRN     PONV Management:  Adult Risk Factors:, Non-Smoker, Postop Opioids     CONSENT: Direct conversation   Plan and risks discussed with: Patient   Blood Products: Consented (ALL Blood Products)                  PAC Discussion and Assessment    ASA Classification: 3  Case is suitable for: [place]  Anesthetic techniques and relevant risks discussed: GA  Invasive monitoring and risk discussed: No  Types:   Possibility and Risk of blood transfusion discussed: No  NPO instructions given:   Additional anesthetic preparation and risks discussed:   Needs early admission to pre-op area:   Other:     PAC Resident/NP Anesthesia Assessment:  John Olvera is a 59 year old male scheduled to undergo Revision Thoracic 10-Pelvis Posterior Instrumented Fusion With DAVID-Arn.Stealth, Lumbar 4 Pedicle Subtraction Osteotomy, Removal Of Prior Instrumentation with Salazar Sebastian MD, Ubaldo Orellana MD, Jose Armando Ellis MD, Dr. Burns & Dr. Zapata on 6/10/19 at  [facility] for treatment of Flatback syndrome of lumbar region.     He has the following specific operative considerations:      - Pt has had prior anesthetic. Type: Regional and General - no complications  - Anesthesia considerations:  Refer to PAC assessment in anesthesia records  - Risk of PONV score = 1.  If > 2, anti-emetic intervention recommended.  - Morphine equivalent = 128mg/OME/day of Dilaudid (if > 60mg/24 hr--> needs pain team consult)    CARDIAC: METS 1,able to walk 1/2 to 1 block, activity limited by severe back pain. Was walking 2-3 miles/day in Jan/Feb 2019, but now unable to do so. Denies CP.       RCRI : No serious cardiac risks.  0.4% risk of major adverse cardiac event.     PULMONARY: NICOLE # of risks 4/8 = intermediate  risk     Former smoker, 40 pk yr hx, quit 2/2019     No asthma or inhaler use    GI: GERD, taking omeprazole    RENAL: CKD per chart review (creatinine on 8/31/18 was 1.3). Will recheck today.    ENDO: BMI 40     PreDM, A1c on 10/12/18 was 6.2. Will recheck today.     Obesity: Recommend careful positioning to prevent airway/ventilatory compromise, or tissue injury.     HEME: VTE risk: 1.8%       ORTHO: Minimal extension Neck ROM due to pain, no TMJ      S/P T10-pelvis fusion complicated by infection & ongoing radiculopathy.     Chronic pain: Recommend careful positioning throughout the perioperative period to prevent injury or exacerbation of pain.       Patient was discussed with Dr Ellis. Patient is optimized and is acceptable candidate for the proposed procedure, provided labs today are within acceptable range. No further diagnostic evaluation is needed.        Reviewed and Signed by PAC Mid-Level Provider/Resident  Mid-Level Provider/Resident: Lety Cadena PA-C  Date: 5/28/19  Time: 1248    Attending Anesthesiologist Anesthesia Assessment:  I have examined the patient and reviewed the medical record.  I have discussed the patient with the TELLO and concur with her assessment  The patient is scheduled for revision of recent T10-L3 fusion to T10-pelvis for chronic neuropathic pain  He has had no anesthesia complications in the past (records from two surgeries within last year were reviewed - no difficulty with intubation or hemodynamics noted.  The patient has very limited exercise tolerance secondary to pain but is asymptomatic and had cardiac cath for CP 5 years ago that demonstrated no obstruction (he has had no recurrence of chest pain)  He has significant opioid use secondary to back pain    PE:  Obese male in obvious pain.  Difficulty in extending neck secondary to pain.  MPC 3, six cm TMD, thick neck  Lungs clear  CV  RRR without murmur    No further testing necessary per protocol.  Complex spine orders  entered  Final plan per attending anesthesiologist the day of surgery.      Reviewed and Signed by PAC Anesthesiologist  Anesthesiologist: Oracio Ellis MD  Date: 5/28/2019  Time:   Pass/Fail:   Disposition:     PAC Pharmacist Assessment:  PREOPERATIVE PAIN CONSULT FOR POSTOPERATIVE PAIN MANAGEMENT  John Olvera was seen and interviewed during PAC Clinic appointment on May 28, 2019 in preparation for the planned procedure with Dr. Sebastian on 6/10/19 at the [facility] for Revision Thoracic 10-Pelvis Posterior Instrumented Fusion With O-Arn.Stealth, Lumbar 4 Pedicle Subtraction Osteotomy, Removal Of Prior Instrumentation.    These recommendations are intended for patients admitted to the hospital after a procedure and are only valid for 30 days from the date of service. If there are significant changes in opioid dosing between today and day of procedure the below recommendations may have to be adjusted. Based on [place] Prescription Monitoring Profile and patient interview:     - OUTPATIENT MEDICATIONS (related to pain management):  -- Long-acting opioid: None  -- Short-acting opioid: HYDROmorphone 8mg by mouth four times daily  -- Intrathecal pump: None  -- Oral adjuvant(s): gabapentin 800mg by mouth four times daily, tizanidine 4mg by mouth twice daily prn   -- Topicals: None  -- Bowel regimen: Senokot-S, 1 tablet by mouth four times daily as needed      Verbal consent was given by patient to access pharmacy records and [place] Prescription Monitoring Profile: Yes    ASSESSMENT:    John Olvera has a history of depression and chronic back pain. He has been dealing with back pain since 1999 when he was picking up a box of bleach containers at his job. He has undergone numerous back surgeries with the most recent on in 2018. He has tried many opioids in the past and the most effective was the HYDROmorphone. Today, he rated his pain at a 8/10 on a 0-10 scale, which has been the best  it's gotten since his 2018 surgery. He describes the pain as predominantly nerve pain but doesn't radiate down his legs, it stays localized to his right back. He's able to walk and tries to walk about a block each day but sometimes is unable to do so. He is opioid tolerant, on relatively high doses of opioid analgesic at stable doses. Outpatient opioids prescribed by Dr. Ramírez, his chronic pain provider.    He denies a history of NICOLE. He drinks very little and doesn't smoke tobacco. He denies any use of illicit drugs and reports never attending chemical dependency. He sleeps on average about 6 hours/night. He has a bowel movement almost every day.     Outpatient opioid oral morphine equivalent (OME): 128mg/day     Other pain therapies tried in the past (not an all inclusive list):   PCA - patient has received HYDROmorphone PCA in the past without complication  -- oxyCODONE = ineffective  -- morphine = tolearted  -- HYDROmorphone = works best    Pain therapies never tried (not an all inclusive list):   -- Ketamine - denies seizures, uncontrolled hypertension, cardiac arrhythmias, PTSD, BPAD, schizophrenia, psychiatric disorders (eg. hallucinations/delirium), history of brain cancer, cardiac failure, previous CVA and increased IOP/glaucoma and is open to using ketamine for pain control.     RECOMMENDATIONS:   The following pain management recommendations are made based on information from today's visit and should not replace medical decision-making based on patient condition at the time of procedure or postoperatively.      - PREOPERATIVE:  -- Short acting opioid - HYDROmorphone 8mg by mouth four times daily   -- Adjuvants/nonopioid analgesics - gabapentin 800mg by mouth four times daily, tizanidine 4mg by mouth twice daily as needed muscle spasms   --  Before surgery recommend gabapentin 300 mg PO x 1 dose in pre-op area (Written in pre-op by PAC TELLO)   -- Before procedure recommend acetaminophen 975 mg PO in  pre-op (Written in pre-op by PAC TELLO)    - INTRAOPERATIVE (Anesthesiologist/CRNA to consider):   -- Regional anesthesia - Defer to RAPS team   -- Ketamine IV intraoperatively  -- Avoid remifentanil - to reduce risk of developing hyperalgesia    - POSTOPERATIVE MANAGEMENT:  Place inpatient pain management consult to assist with acute postoperative pain management.    Opioid analgesic:  + Note: if neuraxial opioid or other long acting opioid is given during procedure contact on-call pain provider for adjustment in opioid plan  + Note if regional approach includes an opioid via the epidural then defer opioid management to RAPS team.            -- HYDROmorphone (Dilaudid) PCA dose range 0.3-0.5 mg Q 10 min lockout interval with continuous rate 0.1 mg/hr.  The 0.1 mg/hr continuous rate accounts for patient's outpatient opioid use.  The PCA doses are for acute postoperative pain.  Start with 0.3mg PCA dose and increase dose by 0.1 mg every 1 hour as necessary for pain control or improvement in physical function, hold or reduce dose for sedation. Notify provider to assess for uncontrolled pain or sedation and adjust dose as necessary. (will plan to keep him on for at leaset 24 hours then transition to oral HYDROmorphone)    -- Initiate continuous IV ketamine at 5mg/hr with low tolerance to increase to 7.5mg/hr after 4 hours if patient tolerates         -- Hold other PO and IV opioids while on PCA    Nonopioid analgesics:   + Defer use of NSAID to surgeon  + Start acetaminophen 975 mg PO every 6 hr scheduled if no concern about masking fevers  + Continue gabapentin 800 mg PO every 8 hours scheduled    Muscle Relaxant:   + tizanidine 4 mg PO Q8 hr PRN muscle spasm     Stool softeners/Laxatives:   + When appropriate start senna-docusate 1-2 tabs PO BID and Miralax 17 g daily to prevent opioid induced constipation.     Other:  + Recommend close monitoring of respiratory status postoperatively with capnography and continuous  SpO2 monitoring. Would recommend continuing capnography beyond the usual 24 hr due to patient's opioid tolerance.     + If patient gets admitted to ICU, consider Dexmedetomidine 0.2-0.7 mcg/kg/hr IV continuous infusion. Goal for this is NOT sedation. Goal is to reduce opioid tolerance/needs. Patient needs to be awake and alert enough to effectively use their PCA. This can only be initiated if patient is in the ICU setting.    + Ketamine IV infusion.  If needed for acute postop uncontrolled pain, the primary service may decide to start ketamine infusion at 5mg/hr.  Ketamine for acute pain management will be used as an analgesic medication in addition to opioids when usual analgesics are suboptimal.Only start ketamine IV if patient is stable from a cardiovascular standpoint.    If using [employer] pump to deliver: Obtain acrylic lockbox (stored in med room) and obtain portless tubing (stored in med rooms or call sterile stores and ask for people soft product number [id number] (69 inch tube) or [id number] (102 inch tube))    Low dose ketamine may be administered on a regular nursing unit according to [facility] ketamine-low dose policy.  Please see: [email or web address]     Absolute Contraindications (do not start ketamine):   Intracranial hypertension  Uncontrolled hypertension  Known or suspected schizophrenia (even if currently stable or controlled with medications)  Relative contraindications:   Cardiac failure  Previous cerebrovascular accidents  Severe psychiatric disorders associated with hallucinations or delirium  Monitoring while on IV ketamine:  Vital signs 15 min after first dose and after each dose increase  Reassess minimally every 4 hours while patient is receiving stable (unchanging) dose of ketamine  Common adverse effects of ketamine:  Psychotomimetic effects (hallucinations or dreamlike feelings), dysphoria, anxiousness, restlessness,  cognitive impairment and mild sedation are most common adverse effect at these low doses, but even this adverse event doesn't happen all the time.  The psychomimetic effects may be reduced or prevented by co-administration of a benzodiazepine:  e.g. lorazepam or diazepam  Dose related and therefore less common at  low doses: hypertension (or hypotension), excessive sedation  Monitor for respiratory depression when given with other medications that cause sedation.   Discontinuation of ketamine:  Tapering down the dose for pain control may be needed with higher doses prior to discontinuation, however the infusion can be stopped abruptly with no formal weaning if patient is experiencing side effects      The inpatient pain service will follow up on patient after consult is placed and offer further recommendations for pain management.  If immediate assistance is needed please contact the pain service at the number below.     Ritchie Davis RPH  May 28, 2019  11:42 AM    If questions or concerns, please contact the Inpatient Pain Management Service:  Call [phone] after hours, weekends and holidays.   Page [phone] from 8 AM - 3 PM Mon - Fri.        Reviewed and Signed by PAC Pharmacist  Pharmacist: Ritchie Davis PharmD  Date: 5/28/19  Time:12:39        Lety Cadena PA-C

## 2019-05-28 NOTE — NURSING NOTE
Reason For Visit:   Chief Complaint   Patient presents with     RECHECK     PAC review and discuss surgery        There were no vitals taken for this visit.         Kali Ortiz ATC

## 2019-05-30 LAB — COTININE UR QL: NEGATIVE NG/ML

## 2019-06-08 RX ORDER — ACETAMINOPHEN 325 MG/1
975 TABLET ORAL ONCE
Status: CANCELLED | OUTPATIENT
Start: 2019-06-08 | End: 2019-06-08

## 2019-06-08 RX ORDER — GABAPENTIN 300 MG/1
300 CAPSULE ORAL
Status: CANCELLED | OUTPATIENT
Start: 2019-06-08

## 2019-06-10 ENCOUNTER — ANESTHESIA (OUTPATIENT)
Dept: SURGERY | Facility: CLINIC | Age: 59
DRG: 457 | End: 2019-06-10
Payer: OTHER MISCELLANEOUS

## 2019-06-10 ENCOUNTER — HOSPITAL ENCOUNTER (INPATIENT)
Facility: CLINIC | Age: 59
LOS: 8 days | Discharge: HOME | DRG: 457 | End: 2019-06-18
Attending: ORTHOPAEDIC SURGERY | Admitting: INTERNAL MEDICINE
Payer: OTHER MISCELLANEOUS

## 2019-06-10 ENCOUNTER — APPOINTMENT (OUTPATIENT)
Dept: GENERAL RADIOLOGY | Facility: CLINIC | Age: 59
DRG: 457 | End: 2019-06-10
Attending: ORTHOPAEDIC SURGERY
Payer: OTHER MISCELLANEOUS

## 2019-06-10 DIAGNOSIS — Z98.890 S/P SPINAL SURGERY: Primary | ICD-10-CM

## 2019-06-10 LAB
ABO + RH BLD: NORMAL
ABO + RH BLD: NORMAL
ALBUMIN SERPL-MCNC: 2.9 G/DL (ref 3.4–5)
ALP SERPL-CCNC: 39 U/L (ref 40–150)
ALT SERPL W P-5'-P-CCNC: 42 U/L (ref 0–70)
ANION GAP SERPL CALCULATED.3IONS-SCNC: 8 MMOL/L (ref 3–14)
ANION GAP SERPL CALCULATED.3IONS-SCNC: 9 MMOL/L (ref 3–14)
APTT PPP: 30 SEC (ref 22–37)
AST SERPL W P-5'-P-CCNC: 71 U/L (ref 0–45)
BASE DEFICIT BLDA-SCNC: 1 MMOL/L
BASE DEFICIT BLDA-SCNC: 6 MMOL/L
BASE EXCESS BLDA CALC-SCNC: 1.5 MMOL/L
BILIRUB SERPL-MCNC: 0.6 MG/DL (ref 0.2–1.3)
BLD GP AB SCN SERPL QL: NORMAL
BLD PROD TYP BPU: NORMAL
BLOOD BANK CMNT PATIENT-IMP: NORMAL
BUN SERPL-MCNC: 15 MG/DL (ref 7–30)
CA-I BLD-MCNC: 3.8 MG/DL (ref 4.4–5.2)
CA-I BLD-MCNC: 4 MG/DL (ref 4.4–5.2)
CA-I BLD-MCNC: 4.2 MG/DL (ref 4.4–5.2)
CA-I BLD-MCNC: 4.3 MG/DL (ref 4.4–5.2)
CALCIUM SERPL-MCNC: 7.6 MG/DL (ref 8.5–10.1)
CHLORIDE SERPL-SCNC: 108 MMOL/L (ref 94–109)
CHLORIDE SERPL-SCNC: 110 MMOL/L (ref 94–109)
CO2 SERPL-SCNC: 22 MMOL/L (ref 20–32)
CO2 SERPL-SCNC: 24 MMOL/L (ref 20–32)
CREAT SERPL-MCNC: 1.23 MG/DL (ref 0.66–1.25)
FIBRINOGEN PPP-MCNC: 235 MG/DL (ref 200–420)
GFR SERPL CREATININE-BSD FRML MDRD: 64 ML/MIN/{1.73_M2}
GLUCOSE BLD-MCNC: 128 MG/DL (ref 70–99)
GLUCOSE BLD-MCNC: 159 MG/DL (ref 70–99)
GLUCOSE BLD-MCNC: 166 MG/DL (ref 70–99)
GLUCOSE BLDC GLUCOMTR-MCNC: 113 MG/DL (ref 70–99)
GLUCOSE SERPL-MCNC: 172 MG/DL (ref 70–99)
HCO3 BLD-SCNC: 20 MMOL/L (ref 21–28)
HCO3 BLD-SCNC: 23 MMOL/L (ref 21–28)
HCO3 BLD-SCNC: 26 MMOL/L (ref 21–28)
HCT VFR BLD AUTO: 35.7 % (ref 40–53)
HGB BLD-MCNC: 11.2 G/DL (ref 13.3–17.7)
HGB BLD-MCNC: 11.3 G/DL (ref 13.3–17.7)
HGB BLD-MCNC: 11.5 G/DL (ref 13.3–17.7)
HGB BLD-MCNC: 13.4 G/DL (ref 13.3–17.7)
INR PPP: 1.16 (ref 0.86–1.14)
LACTATE BLD-SCNC: 2.9 MMOL/L (ref 0.7–2)
LACTATE BLD-SCNC: 3.6 MMOL/L (ref 0.7–2)
LACTATE BLD-SCNC: 3.8 MMOL/L (ref 0.7–2)
LACTATE BLD-SCNC: 4.4 MMOL/L (ref 0.7–2)
NUM BPU REQUESTED: 2
O2/TOTAL GAS SETTING VFR VENT: 27 %
O2/TOTAL GAS SETTING VFR VENT: ABNORMAL %
O2/TOTAL GAS SETTING VFR VENT: ABNORMAL %
PCO2 BLD: 37 MM HG (ref 35–45)
PCO2 BLD: 38 MM HG (ref 35–45)
PCO2 BLD: 40 MM HG (ref 35–45)
PH BLD: 7.31 PH (ref 7.35–7.45)
PH BLD: 7.41 PH (ref 7.35–7.45)
PH BLD: 7.44 PH (ref 7.35–7.45)
PO2 BLD: 100 MM HG (ref 80–105)
PO2 BLD: 111 MM HG (ref 80–105)
PO2 BLD: 117 MM HG (ref 80–105)
POTASSIUM BLD-SCNC: 3.3 MMOL/L (ref 3.4–5.3)
POTASSIUM BLD-SCNC: 3.5 MMOL/L (ref 3.4–5.3)
POTASSIUM BLD-SCNC: 3.9 MMOL/L (ref 3.4–5.3)
POTASSIUM SERPL-SCNC: 3.8 MMOL/L (ref 3.4–5.3)
POTASSIUM SERPL-SCNC: 4.7 MMOL/L (ref 3.4–5.3)
PROT SERPL-MCNC: 5.2 G/DL (ref 6.8–8.8)
SODIUM BLD-SCNC: 138 MMOL/L (ref 133–144)
SODIUM BLD-SCNC: 139 MMOL/L (ref 133–144)
SODIUM BLD-SCNC: 140 MMOL/L (ref 133–144)
SODIUM SERPL-SCNC: 140 MMOL/L (ref 133–144)
SODIUM SERPL-SCNC: 141 MMOL/L (ref 133–144)
SPECIMEN EXP DATE BLD: NORMAL

## 2019-06-10 PROCEDURE — 25800030 ZZH RX IP 258 OP 636

## 2019-06-10 PROCEDURE — 8E0WXBF COMPUTER ASSISTED PROCEDURE OF TRUNK REGION, WITH FLUOROSCOPY: ICD-10-PCS | Performed by: ORTHOPAEDIC SURGERY

## 2019-06-10 PROCEDURE — 25000125 ZZHC RX 250: Performed by: PHYSICIAN ASSISTANT

## 2019-06-10 PROCEDURE — 40000170 ZZH STATISTIC PRE-PROCEDURE ASSESSMENT II: Performed by: ORTHOPAEDIC SURGERY

## 2019-06-10 PROCEDURE — 36415 COLL VENOUS BLD VENIPUNCTURE: CPT | Performed by: ANESTHESIOLOGY

## 2019-06-10 PROCEDURE — 0SG3071 FUSION OF LUMBOSACRAL JOINT WITH AUTOLOGOUS TISSUE SUBSTITUTE, POSTERIOR APPROACH, POSTERIOR COLUMN, OPEN APPROACH: ICD-10-PCS | Performed by: ORTHOPAEDIC SURGERY

## 2019-06-10 PROCEDURE — 87176 TISSUE HOMOGENIZATION CULTR: CPT | Performed by: ORTHOPAEDIC SURGERY

## 2019-06-10 PROCEDURE — 25000125 ZZHC RX 250: Performed by: ANESTHESIOLOGY

## 2019-06-10 PROCEDURE — 80053 COMPREHEN METABOLIC PANEL: CPT | Performed by: ANESTHESIOLOGY

## 2019-06-10 PROCEDURE — 0SG1071 FUSION OF 2 OR MORE LUMBAR VERTEBRAL JOINTS WITH AUTOLOGOUS TISSUE SUBSTITUTE, POSTERIOR APPROACH, POSTERIOR COLUMN, OPEN APPROACH: ICD-10-PCS | Performed by: ORTHOPAEDIC SURGERY

## 2019-06-10 PROCEDURE — 25000128 H RX IP 250 OP 636: Performed by: PHYSICIAN ASSISTANT

## 2019-06-10 PROCEDURE — 25000566 ZZH SEVOFLURANE, EA 15 MIN: Performed by: ORTHOPAEDIC SURGERY

## 2019-06-10 PROCEDURE — P9041 ALBUMIN (HUMAN),5%, 50ML: HCPCS | Performed by: NURSE ANESTHETIST, CERTIFIED REGISTERED

## 2019-06-10 PROCEDURE — 0SP004Z REMOVAL OF INTERNAL FIXATION DEVICE FROM LUMBAR VERTEBRAL JOINT, OPEN APPROACH: ICD-10-PCS | Performed by: ORTHOPAEDIC SURGERY

## 2019-06-10 PROCEDURE — 25000128 H RX IP 250 OP 636: Performed by: NURSE ANESTHETIST, CERTIFIED REGISTERED

## 2019-06-10 PROCEDURE — 84295 ASSAY OF SERUM SODIUM: CPT | Performed by: ORTHOPAEDIC SURGERY

## 2019-06-10 PROCEDURE — 37000009 ZZH ANESTHESIA TECHNICAL FEE, EACH ADDTL 15 MIN: Performed by: ORTHOPAEDIC SURGERY

## 2019-06-10 PROCEDURE — 25800030 ZZH RX IP 258 OP 636: Performed by: PHYSICIAN ASSISTANT

## 2019-06-10 PROCEDURE — C1713 ANCHOR/SCREW BN/BN,TIS/BN: HCPCS | Performed by: ORTHOPAEDIC SURGERY

## 2019-06-10 PROCEDURE — 85384 FIBRINOGEN ACTIVITY: CPT | Performed by: ORTHOPAEDIC SURGERY

## 2019-06-10 PROCEDURE — 25800030 ZZH RX IP 258 OP 636: Performed by: NURSE ANESTHETIST, CERTIFIED REGISTERED

## 2019-06-10 PROCEDURE — 71000017 ZZH RECOVERY PHASE 1 LEVEL 3 EA ADDTL HR: Performed by: ORTHOPAEDIC SURGERY

## 2019-06-10 PROCEDURE — 87075 CULTR BACTERIA EXCEPT BLOOD: CPT | Performed by: ORTHOPAEDIC SURGERY

## 2019-06-10 PROCEDURE — 82947 ASSAY GLUCOSE BLOOD QUANT: CPT | Performed by: ORTHOPAEDIC SURGERY

## 2019-06-10 PROCEDURE — 85730 THROMBOPLASTIN TIME PARTIAL: CPT | Performed by: ORTHOPAEDIC SURGERY

## 2019-06-10 PROCEDURE — 71000016 ZZH RECOVERY PHASE 1 LEVEL 3 FIRST HR: Performed by: ORTHOPAEDIC SURGERY

## 2019-06-10 PROCEDURE — 25000128 H RX IP 250 OP 636

## 2019-06-10 PROCEDURE — 40000278 XR SURGERY CARM FLUORO LESS THAN 5 MIN: Mod: TC

## 2019-06-10 PROCEDURE — 25800030 ZZH RX IP 258 OP 636: Performed by: ANESTHESIOLOGY

## 2019-06-10 PROCEDURE — 25000125 ZZHC RX 250: Performed by: NURSE ANESTHETIST, CERTIFIED REGISTERED

## 2019-06-10 PROCEDURE — 00000146 ZZHCL STATISTIC GLUCOSE BY METER IP

## 2019-06-10 PROCEDURE — 83605 ASSAY OF LACTIC ACID: CPT | Performed by: ORTHOPAEDIC SURGERY

## 2019-06-10 PROCEDURE — 0QH304Z INSERTION OF INTERNAL FIXATION DEVICE INTO LEFT PELVIC BONE, OPEN APPROACH: ICD-10-PCS | Performed by: ORTHOPAEDIC SURGERY

## 2019-06-10 PROCEDURE — 95940 IONM IN OPERATNG ROOM 15 MIN: CPT | Performed by: ORTHOPAEDIC SURGERY

## 2019-06-10 PROCEDURE — 27210995 ZZH RX 272: Performed by: ORTHOPAEDIC SURGERY

## 2019-06-10 PROCEDURE — 85610 PROTHROMBIN TIME: CPT | Performed by: ORTHOPAEDIC SURGERY

## 2019-06-10 PROCEDURE — 25800030 ZZH RX IP 258 OP 636: Performed by: ORTHOPAEDIC SURGERY

## 2019-06-10 PROCEDURE — 85018 HEMOGLOBIN: CPT | Performed by: ANESTHESIOLOGY

## 2019-06-10 PROCEDURE — 25000128 H RX IP 250 OP 636: Performed by: ANESTHESIOLOGY

## 2019-06-10 PROCEDURE — 27211022 ZZHC OR IOM SUPPLIES OPNP: Performed by: ORTHOPAEDIC SURGERY

## 2019-06-10 PROCEDURE — 25000132 ZZH RX MED GY IP 250 OP 250 PS 637: Performed by: ANESTHESIOLOGY

## 2019-06-10 PROCEDURE — 0RGA071 FUSION OF THORACOLUMBAR VERTEBRAL JOINT WITH AUTOLOGOUS TISSUE SUBSTITUTE, POSTERIOR APPROACH, POSTERIOR COLUMN, OPEN APPROACH: ICD-10-PCS | Performed by: ORTHOPAEDIC SURGERY

## 2019-06-10 PROCEDURE — 27210794 ZZH OR GENERAL SUPPLY STERILE: Performed by: ORTHOPAEDIC SURGERY

## 2019-06-10 PROCEDURE — 0RPA04Z REMOVAL OF INTERNAL FIXATION DEVICE FROM THORACOLUMBAR VERTEBRAL JOINT, OPEN APPROACH: ICD-10-PCS | Performed by: ORTHOPAEDIC SURGERY

## 2019-06-10 PROCEDURE — 37000008 ZZH ANESTHESIA TECHNICAL FEE, 1ST 30 MIN: Performed by: ORTHOPAEDIC SURGERY

## 2019-06-10 PROCEDURE — 25000128 H RX IP 250 OP 636: Performed by: ORTHOPAEDIC SURGERY

## 2019-06-10 PROCEDURE — 40000014 ZZH STATISTIC ARTERIAL MONITORING DAILY

## 2019-06-10 PROCEDURE — 82803 BLOOD GASES ANY COMBINATION: CPT | Performed by: ORTHOPAEDIC SURGERY

## 2019-06-10 PROCEDURE — 84132 ASSAY OF SERUM POTASSIUM: CPT | Performed by: ORTHOPAEDIC SURGERY

## 2019-06-10 PROCEDURE — C1762 CONN TISS, HUMAN(INC FASCIA): HCPCS | Performed by: ORTHOPAEDIC SURGERY

## 2019-06-10 PROCEDURE — 0RP604Z REMOVAL OF INTERNAL FIXATION DEVICE FROM THORACIC VERTEBRAL JOINT, OPEN APPROACH: ICD-10-PCS | Performed by: ORTHOPAEDIC SURGERY

## 2019-06-10 PROCEDURE — 80051 ELECTROLYTE PANEL: CPT | Performed by: ORTHOPAEDIC SURGERY

## 2019-06-10 PROCEDURE — 85014 HEMATOCRIT: CPT | Performed by: ORTHOPAEDIC SURGERY

## 2019-06-10 PROCEDURE — 0RG7071 FUSION OF 2 TO 7 THORACIC VERTEBRAL JOINTS WITH AUTOLOGOUS TISSUE SUBSTITUTE, POSTERIOR APPROACH, POSTERIOR COLUMN, OPEN APPROACH: ICD-10-PCS | Performed by: ORTHOPAEDIC SURGERY

## 2019-06-10 PROCEDURE — 82330 ASSAY OF CALCIUM: CPT | Performed by: ORTHOPAEDIC SURGERY

## 2019-06-10 PROCEDURE — 0QH204Z INSERTION OF INTERNAL FIXATION DEVICE INTO RIGHT PELVIC BONE, OPEN APPROACH: ICD-10-PCS | Performed by: ORTHOPAEDIC SURGERY

## 2019-06-10 PROCEDURE — 36000076 ZZH SURGERY LEVEL 6 EA 15 ADDTL MIN - UMMC: Performed by: ORTHOPAEDIC SURGERY

## 2019-06-10 PROCEDURE — 27211020 ZZHC OR CELL SAVER OPNP: Performed by: ORTHOPAEDIC SURGERY

## 2019-06-10 PROCEDURE — 25000125 ZZHC RX 250: Performed by: ORTHOPAEDIC SURGERY

## 2019-06-10 PROCEDURE — 12000004 ZZH R&B IMCU UMMC

## 2019-06-10 PROCEDURE — 82805 BLOOD GASES W/O2 SATURATION: CPT | Performed by: ANESTHESIOLOGY

## 2019-06-10 PROCEDURE — 36000078 ZZH SURGERY LEVEL 6 W FLUORO 1ST 30 MIN - UMMC: Performed by: ORTHOPAEDIC SURGERY

## 2019-06-10 PROCEDURE — 83605 ASSAY OF LACTIC ACID: CPT | Performed by: ANESTHESIOLOGY

## 2019-06-10 PROCEDURE — 25000565 ZZH ISOFLURANE, EA 15 MIN: Performed by: ORTHOPAEDIC SURGERY

## 2019-06-10 PROCEDURE — 82330 ASSAY OF CALCIUM: CPT | Performed by: ANESTHESIOLOGY

## 2019-06-10 PROCEDURE — 0SP304Z REMOVAL OF INTERNAL FIXATION DEVICE FROM LUMBOSACRAL JOINT, OPEN APPROACH: ICD-10-PCS | Performed by: ORTHOPAEDIC SURGERY

## 2019-06-10 PROCEDURE — 4A1134G MONITORING OF PERIPHERAL NERVOUS ELECTRICAL ACTIVITY, INTRAOPERATIVE, PERCUTANEOUS APPROACH: ICD-10-PCS | Performed by: ORTHOPAEDIC SURGERY

## 2019-06-10 PROCEDURE — P9041 ALBUMIN (HUMAN),5%, 50ML: HCPCS | Performed by: ANESTHESIOLOGY

## 2019-06-10 PROCEDURE — 87070 CULTURE OTHR SPECIMN AEROBIC: CPT | Performed by: ORTHOPAEDIC SURGERY

## 2019-06-10 DEVICE — IMP SCR MEDT 5.5/6.0MM SOLERA 8.5X55MM MA 55840008555: Type: IMPLANTABLE DEVICE | Site: SPINE LUMBAR | Status: FUNCTIONAL

## 2019-06-10 DEVICE — IMP SCR MEDT 5.5/6.0MM SOLERA 8.5X50MM MA 55840008550: Type: IMPLANTABLE DEVICE | Site: SPINE LUMBAR | Status: FUNCTIONAL

## 2019-06-10 DEVICE — IMP SCR MEDT 5.5/6.0MM SOLERA 7.5X50MM MA 55840007550: Type: IMPLANTABLE DEVICE | Site: SPINE LUMBAR | Status: FUNCTIONAL

## 2019-06-10 DEVICE — IMP SCR MEDT 5.5/6.0MM SOLERA 8.5X60MM MA 55840008560: Type: IMPLANTABLE DEVICE | Site: SPINE LUMBAR | Status: FUNCTIONAL

## 2019-06-10 DEVICE — IMP SCR MEDT 5.5/6.0MM SOLERA 6.5X55MM MA 55840006555: Type: IMPLANTABLE DEVICE | Site: SPINE LUMBAR | Status: FUNCTIONAL

## 2019-06-10 DEVICE — IMP SCR SET MEDT SOLERA BREAK OFF 5.5MM TI 5540030: Type: IMPLANTABLE DEVICE | Site: SPINE LUMBAR | Status: FUNCTIONAL

## 2019-06-10 DEVICE — GRAFT BONE CRUSH CANC 30ML 400080: Type: IMPLANTABLE DEVICE | Site: SPINE LUMBAR | Status: FUNCTIONAL

## 2019-06-10 DEVICE — IMP SCR MEDT 5.5/6.0MM SOLERA 7.5X55MM MA 55840007555: Type: IMPLANTABLE DEVICE | Site: SPINE LUMBAR | Status: FUNCTIONAL

## 2019-06-10 DEVICE — IMP ROD MEDT SOLERA LINED 5.5X500MM CHR 1555200500: Type: IMPLANTABLE DEVICE | Site: SPINE LUMBAR | Status: FUNCTIONAL

## 2019-06-10 RX ORDER — ALBUMIN, HUMAN INJ 5% 5 %
250 SOLUTION INTRAVENOUS ONCE
Status: COMPLETED | OUTPATIENT
Start: 2019-06-10 | End: 2019-06-10

## 2019-06-10 RX ORDER — CALCIUM CHLORIDE 100 MG/ML
1 INJECTION INTRAVENOUS; INTRAVENTRICULAR ONCE
Status: COMPLETED | OUTPATIENT
Start: 2019-06-10 | End: 2019-06-10

## 2019-06-10 RX ORDER — CEFAZOLIN SODIUM 2 G/100ML
2 INJECTION, SOLUTION INTRAVENOUS EVERY 8 HOURS
Status: DISCONTINUED | OUTPATIENT
Start: 2019-06-10 | End: 2019-06-14

## 2019-06-10 RX ORDER — FENTANYL CITRATE 50 UG/ML
25-50 INJECTION, SOLUTION INTRAMUSCULAR; INTRAVENOUS
Status: DISCONTINUED | OUTPATIENT
Start: 2019-06-10 | End: 2019-06-10 | Stop reason: HOSPADM

## 2019-06-10 RX ORDER — METOCLOPRAMIDE 10 MG/1
10 TABLET ORAL EVERY 6 HOURS PRN
Status: DISCONTINUED | OUTPATIENT
Start: 2019-06-10 | End: 2019-06-18 | Stop reason: HOSPADM

## 2019-06-10 RX ORDER — OXYCODONE HYDROCHLORIDE 5 MG/1
5 TABLET ORAL EVERY 4 HOURS PRN
Status: DISCONTINUED | OUTPATIENT
Start: 2019-06-10 | End: 2019-06-11

## 2019-06-10 RX ORDER — PROMETHAZINE HYDROCHLORIDE 25 MG/1
25 TABLET ORAL EVERY MORNING
Status: DISCONTINUED | OUTPATIENT
Start: 2019-06-11 | End: 2019-06-18 | Stop reason: HOSPADM

## 2019-06-10 RX ORDER — ALBUMIN, HUMAN INJ 5% 5 %
SOLUTION INTRAVENOUS CONTINUOUS PRN
Status: DISCONTINUED | OUTPATIENT
Start: 2019-06-10 | End: 2019-06-10

## 2019-06-10 RX ORDER — SODIUM CHLORIDE 9 MG/ML
INJECTION, SOLUTION INTRAVENOUS CONTINUOUS PRN
Status: DISCONTINUED | OUTPATIENT
Start: 2019-06-10 | End: 2019-06-10

## 2019-06-10 RX ORDER — HYDROMORPHONE HYDROCHLORIDE 1 MG/ML
.3-.5 INJECTION, SOLUTION INTRAMUSCULAR; INTRAVENOUS; SUBCUTANEOUS
Status: DISCONTINUED | OUTPATIENT
Start: 2019-06-10 | End: 2019-06-10

## 2019-06-10 RX ORDER — SODIUM CHLORIDE, SODIUM GLUCONATE, SODIUM ACETATE, POTASSIUM CHLORIDE AND MAGNESIUM CHLORIDE 526; 502; 368; 37; 30 MG/100ML; MG/100ML; MG/100ML; MG/100ML; MG/100ML
1-3 INJECTION, SOLUTION INTRAVENOUS CONTINUOUS
Status: DISCONTINUED | OUTPATIENT
Start: 2019-06-10 | End: 2019-06-10 | Stop reason: HOSPADM

## 2019-06-10 RX ORDER — CEFAZOLIN SODIUM 1 G/3ML
1 INJECTION, POWDER, FOR SOLUTION INTRAMUSCULAR; INTRAVENOUS SEE ADMIN INSTRUCTIONS
Status: DISCONTINUED | OUTPATIENT
Start: 2019-06-10 | End: 2019-06-10 | Stop reason: HOSPADM

## 2019-06-10 RX ORDER — DEXTROSE MONOHYDRATE, SODIUM CHLORIDE, AND POTASSIUM CHLORIDE 50; 1.49; 4.5 G/1000ML; G/1000ML; G/1000ML
INJECTION, SOLUTION INTRAVENOUS CONTINUOUS
Status: DISCONTINUED | OUTPATIENT
Start: 2019-06-10 | End: 2019-06-12

## 2019-06-10 RX ORDER — SODIUM CHLORIDE 9 MG/ML
INJECTION, SOLUTION INTRAVENOUS CONTINUOUS
Status: DISCONTINUED | OUTPATIENT
Start: 2019-06-10 | End: 2019-06-13

## 2019-06-10 RX ORDER — ONDANSETRON 2 MG/ML
4 INJECTION INTRAMUSCULAR; INTRAVENOUS EVERY 6 HOURS PRN
Status: DISCONTINUED | OUTPATIENT
Start: 2019-06-10 | End: 2019-06-18 | Stop reason: HOSPADM

## 2019-06-10 RX ORDER — ONDANSETRON 4 MG/1
4 TABLET, ORALLY DISINTEGRATING ORAL EVERY 30 MIN PRN
Status: DISCONTINUED | OUTPATIENT
Start: 2019-06-10 | End: 2019-06-10 | Stop reason: HOSPADM

## 2019-06-10 RX ORDER — ONDANSETRON 4 MG/1
4 TABLET, ORALLY DISINTEGRATING ORAL EVERY 6 HOURS PRN
Status: DISCONTINUED | OUTPATIENT
Start: 2019-06-10 | End: 2019-06-18 | Stop reason: HOSPADM

## 2019-06-10 RX ORDER — DEXAMETHASONE SODIUM PHOSPHATE 4 MG/ML
INJECTION, SOLUTION INTRA-ARTICULAR; INTRALESIONAL; INTRAMUSCULAR; INTRAVENOUS; SOFT TISSUE PRN
Status: DISCONTINUED | OUTPATIENT
Start: 2019-06-10 | End: 2019-06-10

## 2019-06-10 RX ORDER — FENTANYL CITRATE 50 UG/ML
INJECTION, SOLUTION INTRAMUSCULAR; INTRAVENOUS PRN
Status: DISCONTINUED | OUTPATIENT
Start: 2019-06-10 | End: 2019-06-10

## 2019-06-10 RX ORDER — DIPHENHYDRAMINE HCL 25 MG
25 CAPSULE ORAL EVERY 6 HOURS PRN
Status: DISCONTINUED | OUTPATIENT
Start: 2019-06-10 | End: 2019-06-18 | Stop reason: HOSPADM

## 2019-06-10 RX ORDER — ONDANSETRON 4 MG/1
4 TABLET, ORALLY DISINTEGRATING ORAL EVERY 6 HOURS PRN
Status: DISCONTINUED | OUTPATIENT
Start: 2019-06-10 | End: 2019-06-10

## 2019-06-10 RX ORDER — HYDROMORPHONE HYDROCHLORIDE 1 MG/ML
.3-.5 INJECTION, SOLUTION INTRAMUSCULAR; INTRAVENOUS; SUBCUTANEOUS EVERY 5 MIN PRN
Status: DISCONTINUED | OUTPATIENT
Start: 2019-06-10 | End: 2019-06-10 | Stop reason: HOSPADM

## 2019-06-10 RX ORDER — GABAPENTIN 100 MG/1
300 CAPSULE ORAL ONCE
Status: DISCONTINUED | OUTPATIENT
Start: 2019-06-10 | End: 2019-06-10 | Stop reason: HOSPADM

## 2019-06-10 RX ORDER — NALOXONE HYDROCHLORIDE 0.4 MG/ML
.1-.4 INJECTION, SOLUTION INTRAMUSCULAR; INTRAVENOUS; SUBCUTANEOUS
Status: DISCONTINUED | OUTPATIENT
Start: 2019-06-10 | End: 2019-06-10

## 2019-06-10 RX ORDER — BISACODYL 10 MG
10 SUPPOSITORY, RECTAL RECTAL DAILY PRN
Status: DISCONTINUED | OUTPATIENT
Start: 2019-06-10 | End: 2019-06-18 | Stop reason: HOSPADM

## 2019-06-10 RX ORDER — CEFAZOLIN SODIUM IN 0.9 % NACL 3 G/100 ML
3 INTRAVENOUS SOLUTION, PIGGYBACK (ML) INTRAVENOUS
Status: COMPLETED | OUTPATIENT
Start: 2019-06-10 | End: 2019-06-10

## 2019-06-10 RX ORDER — NALOXONE HYDROCHLORIDE 0.4 MG/ML
.1-.4 INJECTION, SOLUTION INTRAMUSCULAR; INTRAVENOUS; SUBCUTANEOUS
Status: DISCONTINUED | OUTPATIENT
Start: 2019-06-10 | End: 2019-06-18 | Stop reason: HOSPADM

## 2019-06-10 RX ORDER — PROPOFOL 10 MG/ML
INJECTION, EMULSION INTRAVENOUS PRN
Status: DISCONTINUED | OUTPATIENT
Start: 2019-06-10 | End: 2019-06-10

## 2019-06-10 RX ORDER — BUSPIRONE HYDROCHLORIDE 7.5 MG/1
15 TABLET ORAL
Status: DISCONTINUED | OUTPATIENT
Start: 2019-06-10 | End: 2019-06-18 | Stop reason: HOSPADM

## 2019-06-10 RX ORDER — LIDOCAINE HYDROCHLORIDE 20 MG/ML
INJECTION, SOLUTION INFILTRATION; PERINEURAL PRN
Status: DISCONTINUED | OUTPATIENT
Start: 2019-06-10 | End: 2019-06-10

## 2019-06-10 RX ORDER — ALBUTEROL SULFATE 0.83 MG/ML
2.5 SOLUTION RESPIRATORY (INHALATION) ONCE
Status: COMPLETED | OUTPATIENT
Start: 2019-06-10 | End: 2019-06-10

## 2019-06-10 RX ORDER — ACETAMINOPHEN 325 MG/1
975 TABLET ORAL ONCE
Status: COMPLETED | OUTPATIENT
Start: 2019-06-10 | End: 2019-06-10

## 2019-06-10 RX ORDER — DOCUSATE SODIUM 100 MG/1
100 CAPSULE, LIQUID FILLED ORAL 2 TIMES DAILY
Status: DISCONTINUED | OUTPATIENT
Start: 2019-06-10 | End: 2019-06-18 | Stop reason: HOSPADM

## 2019-06-10 RX ORDER — ESCITALOPRAM OXALATE 20 MG/1
20 TABLET ORAL DAILY
Status: DISCONTINUED | OUTPATIENT
Start: 2019-06-11 | End: 2019-06-18 | Stop reason: HOSPADM

## 2019-06-10 RX ORDER — SODIUM CHLORIDE, SODIUM LACTATE, POTASSIUM CHLORIDE, CALCIUM CHLORIDE 600; 310; 30; 20 MG/100ML; MG/100ML; MG/100ML; MG/100ML
1-3 INJECTION, SOLUTION INTRAVENOUS CONTINUOUS
Status: DISCONTINUED | OUTPATIENT
Start: 2019-06-10 | End: 2019-06-10 | Stop reason: HOSPADM

## 2019-06-10 RX ORDER — ACETAMINOPHEN 325 MG/1
975 TABLET ORAL EVERY 6 HOURS
Status: DISCONTINUED | OUTPATIENT
Start: 2019-06-10 | End: 2019-06-11

## 2019-06-10 RX ORDER — METOCLOPRAMIDE HYDROCHLORIDE 5 MG/ML
10 INJECTION INTRAMUSCULAR; INTRAVENOUS EVERY 6 HOURS PRN
Status: DISCONTINUED | OUTPATIENT
Start: 2019-06-10 | End: 2019-06-18 | Stop reason: HOSPADM

## 2019-06-10 RX ORDER — PROPOFOL 10 MG/ML
INJECTION, EMULSION INTRAVENOUS CONTINUOUS PRN
Status: DISCONTINUED | OUTPATIENT
Start: 2019-06-10 | End: 2019-06-10

## 2019-06-10 RX ORDER — GABAPENTIN 400 MG/1
800 CAPSULE ORAL 3 TIMES DAILY
Status: DISCONTINUED | OUTPATIENT
Start: 2019-06-10 | End: 2019-06-11

## 2019-06-10 RX ORDER — PROCHLORPERAZINE MALEATE 5 MG
10 TABLET ORAL EVERY 6 HOURS PRN
Status: DISCONTINUED | OUTPATIENT
Start: 2019-06-10 | End: 2019-06-18 | Stop reason: HOSPADM

## 2019-06-10 RX ORDER — HYDROMORPHONE HYDROCHLORIDE 1 MG/ML
0.5 INJECTION, SOLUTION INTRAMUSCULAR; INTRAVENOUS; SUBCUTANEOUS ONCE
Status: COMPLETED | OUTPATIENT
Start: 2019-06-11 | End: 2019-06-11

## 2019-06-10 RX ORDER — EPHEDRINE SULFATE 50 MG/ML
INJECTION, SOLUTION INTRAMUSCULAR; INTRAVENOUS; SUBCUTANEOUS PRN
Status: DISCONTINUED | OUTPATIENT
Start: 2019-06-10 | End: 2019-06-10

## 2019-06-10 RX ORDER — HYDROMORPHONE HYDROCHLORIDE 2 MG/1
4 TABLET ORAL ONCE
Status: COMPLETED | OUTPATIENT
Start: 2019-06-10 | End: 2019-06-10

## 2019-06-10 RX ORDER — ONDANSETRON 2 MG/ML
INJECTION INTRAMUSCULAR; INTRAVENOUS PRN
Status: DISCONTINUED | OUTPATIENT
Start: 2019-06-10 | End: 2019-06-10

## 2019-06-10 RX ORDER — AMOXICILLIN 250 MG
2 CAPSULE ORAL 2 TIMES DAILY
Status: DISCONTINUED | OUTPATIENT
Start: 2019-06-10 | End: 2019-06-18 | Stop reason: HOSPADM

## 2019-06-10 RX ORDER — DIPHENHYDRAMINE HYDROCHLORIDE 50 MG/ML
25 INJECTION INTRAMUSCULAR; INTRAVENOUS EVERY 6 HOURS PRN
Status: DISCONTINUED | OUTPATIENT
Start: 2019-06-10 | End: 2019-06-18 | Stop reason: HOSPADM

## 2019-06-10 RX ORDER — TIZANIDINE 2 MG/1
2 TABLET ORAL EVERY 8 HOURS PRN
Status: DISCONTINUED | OUTPATIENT
Start: 2019-06-10 | End: 2019-06-11

## 2019-06-10 RX ORDER — SODIUM CHLORIDE, SODIUM LACTATE, POTASSIUM CHLORIDE, CALCIUM CHLORIDE 600; 310; 30; 20 MG/100ML; MG/100ML; MG/100ML; MG/100ML
INJECTION, SOLUTION INTRAVENOUS CONTINUOUS
Status: DISCONTINUED | OUTPATIENT
Start: 2019-06-10 | End: 2019-06-10 | Stop reason: HOSPADM

## 2019-06-10 RX ORDER — POLYETHYLENE GLYCOL 3350 17 G/17G
17 POWDER, FOR SOLUTION ORAL DAILY
Status: DISCONTINUED | OUTPATIENT
Start: 2019-06-11 | End: 2019-06-18 | Stop reason: HOSPADM

## 2019-06-10 RX ORDER — ONDANSETRON 2 MG/ML
4 INJECTION INTRAMUSCULAR; INTRAVENOUS EVERY 6 HOURS PRN
Status: DISCONTINUED | OUTPATIENT
Start: 2019-06-10 | End: 2019-06-10

## 2019-06-10 RX ORDER — VANCOMYCIN HYDROCHLORIDE 1 G/20ML
INJECTION, POWDER, LYOPHILIZED, FOR SOLUTION INTRAVENOUS PRN
Status: DISCONTINUED | OUTPATIENT
Start: 2019-06-10 | End: 2019-06-10 | Stop reason: HOSPADM

## 2019-06-10 RX ORDER — LIDOCAINE 40 MG/G
CREAM TOPICAL
Status: DISCONTINUED | OUTPATIENT
Start: 2019-06-10 | End: 2019-06-18 | Stop reason: HOSPADM

## 2019-06-10 RX ORDER — ONDANSETRON 2 MG/ML
4 INJECTION INTRAMUSCULAR; INTRAVENOUS EVERY 30 MIN PRN
Status: DISCONTINUED | OUTPATIENT
Start: 2019-06-10 | End: 2019-06-10 | Stop reason: HOSPADM

## 2019-06-10 RX ORDER — PROPOFOL 10 MG/ML
25-150 INJECTION, EMULSION INTRAVENOUS CONTINUOUS
Status: DISCONTINUED | OUTPATIENT
Start: 2019-06-10 | End: 2019-06-10 | Stop reason: HOSPADM

## 2019-06-10 RX ADMIN — ROCURONIUM BROMIDE 50 MG: 10 INJECTION INTRAVENOUS at 07:16

## 2019-06-10 RX ADMIN — PHENYLEPHRINE HYDROCHLORIDE 0.5 MCG/KG/MIN: 10 INJECTION INTRAVENOUS at 08:29

## 2019-06-10 RX ADMIN — Medication: at 19:10

## 2019-06-10 RX ADMIN — Medication 3 G: at 07:57

## 2019-06-10 RX ADMIN — ROCURONIUM BROMIDE 15 MG: 10 INJECTION INTRAVENOUS at 08:31

## 2019-06-10 RX ADMIN — HYDROMORPHONE HYDROCHLORIDE 0.3 MG: 1 INJECTION, SOLUTION INTRAMUSCULAR; INTRAVENOUS; SUBCUTANEOUS at 16:53

## 2019-06-10 RX ADMIN — SUFENTANIL CITRATE 0.2 MCG/KG/HR: 50 INJECTION EPIDURAL; INTRAVENOUS at 07:48

## 2019-06-10 RX ADMIN — PHENYLEPHRINE HYDROCHLORIDE 100 MCG: 10 INJECTION INTRAVENOUS at 09:28

## 2019-06-10 RX ADMIN — KETAMINE HYDROCHLORIDE 10 MG/HR: 100 INJECTION, SOLUTION, CONCENTRATE INTRAMUSCULAR; INTRAVENOUS at 08:03

## 2019-06-10 RX ADMIN — ONDANSETRON 4 MG: 2 INJECTION INTRAMUSCULAR; INTRAVENOUS at 13:30

## 2019-06-10 RX ADMIN — CALCIUM CHLORIDE 1 G: 100 INJECTION INTRAVENOUS; INTRAVENTRICULAR at 17:54

## 2019-06-10 RX ADMIN — PHENYLEPHRINE HYDROCHLORIDE 0.5 MCG/KG/MIN: 10 INJECTION INTRAVENOUS at 09:25

## 2019-06-10 RX ADMIN — ALBUMIN HUMAN 250 ML: 0.05 INJECTION, SOLUTION INTRAVENOUS at 17:00

## 2019-06-10 RX ADMIN — TRANEXAMIC ACID 1270 MG: 100 INJECTION, SOLUTION INTRAVENOUS at 07:48

## 2019-06-10 RX ADMIN — PROPOFOL 290 MG: 10 INJECTION, EMULSION INTRAVENOUS at 07:16

## 2019-06-10 RX ADMIN — Medication 15 MG: at 07:26

## 2019-06-10 RX ADMIN — Medication 1 G: at 14:01

## 2019-06-10 RX ADMIN — Medication 14.6 MG: at 08:13

## 2019-06-10 RX ADMIN — FENTANYL CITRATE 25 MCG: 50 INJECTION INTRAMUSCULAR; INTRAVENOUS at 16:23

## 2019-06-10 RX ADMIN — FENTANYL CITRATE 25 MCG: 50 INJECTION INTRAMUSCULAR; INTRAVENOUS at 16:11

## 2019-06-10 RX ADMIN — SODIUM CHLORIDE, POTASSIUM CHLORIDE, SODIUM LACTATE AND CALCIUM CHLORIDE: 600; 310; 30; 20 INJECTION, SOLUTION INTRAVENOUS at 09:19

## 2019-06-10 RX ADMIN — FENTANYL CITRATE 25 MCG: 50 INJECTION INTRAMUSCULAR; INTRAVENOUS at 16:40

## 2019-06-10 RX ADMIN — Medication 10 MG: at 10:05

## 2019-06-10 RX ADMIN — DEXAMETHASONE SODIUM PHOSPHATE 5 MG: 4 INJECTION, SOLUTION INTRAMUSCULAR; INTRAVENOUS at 09:53

## 2019-06-10 RX ADMIN — SODIUM CHLORIDE, POTASSIUM CHLORIDE, SODIUM LACTATE AND CALCIUM CHLORIDE: 600; 310; 30; 20 INJECTION, SOLUTION INTRAVENOUS at 07:05

## 2019-06-10 RX ADMIN — SODIUM BICARBONATE 50 MEQ: 84 INJECTION INTRAVENOUS at 17:29

## 2019-06-10 RX ADMIN — FENTANYL CITRATE 25 MCG: 50 INJECTION INTRAMUSCULAR; INTRAVENOUS at 16:06

## 2019-06-10 RX ADMIN — PHENYLEPHRINE HYDROCHLORIDE 100 MCG: 10 INJECTION INTRAVENOUS at 14:52

## 2019-06-10 RX ADMIN — HYDROMORPHONE HYDROCHLORIDE 0.5 MG: 1 INJECTION, SOLUTION INTRAMUSCULAR; INTRAVENOUS; SUBCUTANEOUS at 18:48

## 2019-06-10 RX ADMIN — SUGAMMADEX 200 MG: 100 INJECTION, SOLUTION INTRAVENOUS at 13:32

## 2019-06-10 RX ADMIN — SODIUM CHLORIDE, POTASSIUM CHLORIDE, SODIUM LACTATE AND CALCIUM CHLORIDE: 600; 310; 30; 20 INJECTION, SOLUTION INTRAVENOUS at 14:39

## 2019-06-10 RX ADMIN — PHENYLEPHRINE HYDROCHLORIDE 100 MCG: 10 INJECTION INTRAVENOUS at 09:31

## 2019-06-10 RX ADMIN — Medication 2 G: at 08:05

## 2019-06-10 RX ADMIN — SODIUM CHLORIDE 5 MG/KG/HR: 9 INJECTION, SOLUTION INTRAVENOUS at 08:33

## 2019-06-10 RX ADMIN — LIDOCAINE HYDROCHLORIDE 100 MG: 20 INJECTION, SOLUTION INFILTRATION; PERINEURAL at 07:16

## 2019-06-10 RX ADMIN — ALBUMIN HUMAN: 0.05 INJECTION, SOLUTION INTRAVENOUS at 12:13

## 2019-06-10 RX ADMIN — SUFENTANIL CITRATE 0.2 MCG/KG/HR: 50 INJECTION EPIDURAL; INTRAVENOUS at 11:47

## 2019-06-10 RX ADMIN — ALBUTEROL SULFATE 2.5 MG: 2.5 SOLUTION RESPIRATORY (INHALATION) at 06:39

## 2019-06-10 RX ADMIN — PROPOFOL 150 MCG/KG/MIN: 10 INJECTION, EMULSION INTRAVENOUS at 07:53

## 2019-06-10 RX ADMIN — PHENYLEPHRINE HYDROCHLORIDE 0.6 MCG/KG/MIN: 10 INJECTION INTRAVENOUS at 14:10

## 2019-06-10 RX ADMIN — Medication 5 MG/HR: at 15:40

## 2019-06-10 RX ADMIN — FENTANYL CITRATE 25 MCG: 50 INJECTION INTRAMUSCULAR; INTRAVENOUS at 16:51

## 2019-06-10 RX ADMIN — FENTANYL CITRATE 150 MCG: 50 INJECTION, SOLUTION INTRAMUSCULAR; INTRAVENOUS at 07:16

## 2019-06-10 RX ADMIN — SODIUM CHLORIDE: 9 INJECTION, SOLUTION INTRAVENOUS at 10:14

## 2019-06-10 RX ADMIN — Medication 1 G: at 12:00

## 2019-06-10 RX ADMIN — Medication 10 MG: at 07:41

## 2019-06-10 RX ADMIN — PHENYLEPHRINE HYDROCHLORIDE 100 MCG: 10 INJECTION INTRAVENOUS at 13:47

## 2019-06-10 RX ADMIN — ACETAMINOPHEN 975 MG: 325 TABLET, FILM COATED ORAL at 06:38

## 2019-06-10 RX ADMIN — Medication 10 MG: at 13:49

## 2019-06-10 RX ADMIN — HYDROMORPHONE HYDROCHLORIDE 4 MG: 2 TABLET ORAL at 17:58

## 2019-06-10 RX ADMIN — Medication 1 G: at 10:00

## 2019-06-10 RX ADMIN — PHENYLEPHRINE HYDROCHLORIDE 100 MCG: 10 INJECTION INTRAVENOUS at 14:54

## 2019-06-10 ASSESSMENT — MIFFLIN-ST. JEOR: SCORE: 2097.25

## 2019-06-10 NOTE — PROGRESS NOTES
"Orthopaedic Surgery  Progress Note     Subjective: No acute events overnight. Pain controlled.     Denies fever or chills, CP, SOB, numbness or tingling, motor dysfunction or weakness.     Objective:  /55 (BP Location: Right arm)   Pulse 85   Temp 99.5  F (37.5  C) (Oral)   Resp 16   Ht 1.778 m (5' 10\")   Wt 127.6 kg (281 lb 4.9 oz)   SpO2 96%   BMI 40.36 kg/m    General: NAD, alert and oriented, cooperative with exam.  Cardio: RRR, extremities wwp.   Respiratory: Non-labored breathing.  MSK: Dressing c/d/i. DP and PT 2+. Blisters on the anterior shoulders.     Sensation: intact to light touch L3-S1 distribution BLE    Lower Extremity Strength Left Right   L2-3: Hip flexion 5/5 5/5   L4:  Knee extension  5/5 5/5   L5:  Foot / EHL dorsiflexion 5/5 5/5   S1:  Plantarflexion  5/5 5/5       Labs:  Recent Labs   Lab 06/11/19  0455 06/11/19  0035 06/10/19  1650 06/10/19  1344 06/10/19  1129 06/10/19  0848   HGB 9.7* 9.7* 11.3* 13.4 11.5* 11.2*   WBC 9.3 9.8  --   --   --   --        I/O last 3 completed shifts:  In: 240 [P.O.:240]  Out: 3528 [Urine:2900; Drains:628]    All cultures:  Recent Labs   Lab 06/11/19  0036 06/11/19  0035 06/10/19  0942 06/10/19  0940   CULT No growth after 17 hours No growth after 17 hours Culture negative monitoring continues  Culture negative monitoring continues Culture negative monitoring continues  Culture negative monitoring continues       Assessment:  John Olvera is a 59 year old male with PMH including chronic pain, depression, pre-diabetes, history of wound infections now s/p Revision Thoracic 10-Pelvis Posterior Instrumented Fusion With O-Arm.Stealth, Lumbar 4 Pedicle Subtraction Osteotomy, Removal Of Prior Instrumentation on 6/10 with Dr. Sebastian and Dr. Orellana.      Orthopaedics Primary; Hoapitalist comanagement  Activity: Up with assist until independent. No excessive bending or twisting. No lifting >10 lbs x 6 weeks. No Marine lift for transfers  Weight bearing " status: WBAT  Pain management: No NSAIDs   Antibiotics: Ancef until drains removed  Diet: Begin with clear fluids and progress diet as tolerated  DVT prophylaxis: SCDs only. No chemical DVT ppx needed.  Imaging: XR Upright throraco-lumbar PTDC - ordered.  Labs: Hgb POD#1.  Bracing/Splinting: None.  Dressings: Keep Aquacel c/d/i x 7 days.  Drains: Hemovac and STAN drains. Document output per shift.  Leon catheter: Remove POD#1.  Physical Therapy/Occupational Therapy: Eval and treat.  Cultures: Pending, follow culture results closely.  Aerobic and anaerobic  Consults: Hospitalist, Pain management    Follow-up: Clinic with Dr. Sebastian in 6 weeks with repeat x-rays    Disposition: Pending progress with therapies, pain control on orals, and medical stability, anticipate discharge to home Thursday vs Friday    Henrry Holguin MD  Orthopaedic Resident, PGY-4  Pager: [phone]

## 2019-06-10 NOTE — ANESTHESIA PROCEDURE NOTES
Arterial Line Procedure Note  Staff:     Anesthesiologist:  Socrates Dover MD  Location: In OR After Induction  Procedure Start/Stop Times:     patient identified, IV checked, site marked, risks and benefits discussed, informed consent, monitors and equipment checked, pre-op evaluation and at physician/surgeon's request      Correct Patient: Yes      Correct Position: Yes      Correct Site: Yes      Correct Procedure: Yes      Correct Laterality:  Yes    Site Marked:  Yes  Line Placement:     Procedure:  Arterial Line    Insertion Site:  Radial    Insertion laterality:  Left    Skin Prep: Chloraprep      Patient Prep: patient draped, mask, sterile gloves, hat and hand hygiene      Local skin infiltration:  None    Ultrasound Guided?: Yes      Artery evaluated via ultrasound confirming patency.   Using realtime imaging, the artery was punctured and the needle was observed entering the artery.      A permanent image is NOT entered into the patient's record.      Catheter size:  20 gauge, Quick cath    Cath secured with: other (comment)      Dressing:  Tegaderm    Complications:  None obvious    Arterial waveform: Yes      IBP within 10% of NIBP: Yes    Assessment/Narrative:      Secured with benzoin, occlusive tegaderm and tape

## 2019-06-10 NOTE — ANESTHESIA CARE TRANSFER NOTE
Patient: John Olvera    Procedure(s):  Revision Thoracic 10-Pelvis Posterior Instrumented Fusion With O-Arm.Stealth, Lumbar 4 Pedicle Subtraction Osteotomy, Removal Of Prior Instrumentation    Diagnosis: Lumbar Stenosis and Neurogenic Claudication,Flatbak Syndrome Of Lumbar Region  Diagnosis Additional Information: No value filed.    Anesthesia Type:   No value filed.     Note:  Airway :Oral Airway and Face Mask  Patient transferred to:PACU  Comments: In PACU, VSS, no c/o pain or nausea or vomiting.  Exchanging well. Report to RN. Handoff Report: Identifed the Patient, Identified the Reponsible Provider, Reviewed the pertinent medical history, Discussed the surgical course, Reviewed Intra-OP anesthesia mangement and issues during anesthesia, Set expectations for post-procedure period and Allowed opportunity for questions and acknowledgement of understanding      Vitals: (Last set prior to Anesthesia Care Transfer)    CRNA VITALS  6/10/2019 1507 - 6/10/2019 1548      6/10/2019             Pulse:  86    SpO2:  92 %    Resp Rate (observed):  4  (Abnormal)                 Electronically Signed By: SHIVANI Hebert CRNA  Tere 10, 2019  3:48 PM

## 2019-06-10 NOTE — BRIEF OP NOTE
Brief Operative Note    Preop Dx:   Lumbar Stenosis and Neurogenic Claudication,Flatbak Syndrome Of Lumbar Region  Post op Dx:   * No post-op diagnosis entered *  Procedure:    Procedure(s):  Revision Thoracic 10-Pelvis Posterior Instrumented Fusion With O-Arm.Stealth, Lumbar 4 Pedicle Subtraction Osteotomy, Removal Of Prior Instrumentation  Surgeon:      Salazar Sebastian MD, Ubaldo Orellana MD  Assistants:    Shahnaz Maharaj PA-C  Anesthesia:   General  EBL:    2245mL with 1000mL returned with cell saver  Total IV Fluids:  See Anesthesia Record  Specimens:   Deep tissue sent for aerobic and anaerobic culture  Findings:   See Operative Dictation      Assessment and Plan: John Olvera is a 59 year old male with PMH including chronic pain, depression, pre-diabetes, history of wound infections now s/p Revision Thoracic 10-Pelvis Posterior Instrumented Fusion With O-Arm.Stealth, Lumbar 4 Pedicle Subtraction Osteotomy, Removal Of Prior Instrumentation on 6/10/19 with Dr. Sebastian and Dr. Orellana.     Romulo Primary  Activity: Up with assist until independent. No excessive bending or twisting. No lifting >10 lbs x 6 weeks. No Marine lift for transfers.   Weight bearing status: WBAT.  Pain management: Transition from IV to PO as tolerated. No NSAIDs   Antibiotics: Ancef until drains removed  Diet: Begin with clear fluids and progress diet as tolerated.   DVT prophylaxis: SCDs only. No chemical DVT ppx needed.  Imaging: XR Upright throraco-lumbar PTDC - ordered.  Labs: Hgb POD#1.  Bracing/Splinting: None.  Dressings: Keep Aquacel c/d/i x 7 days.  Drains: Hemovac and STAN drains. Document output per shift, will be discontinued at Orthopedic Surgery discretion.  Leon catheter: Remove POD#1.   Physical Therapy/Occupational Therapy: Eval and treat.  Cultures: Pending, follow culture results closely.  Aerobic and anaerobic  Consults: Hospitalist, Pain management  Follow-up: Clinic with Dr. Sebastian in 6 weeks with repeat  x-rays.   Disposition: Pending progress with therapies, pain control on orals, and medical stability, anticipate discharge to home on POD #3-5.        I assisted with positioning, prepping and draping, graft preparation and placement and closure.    Shahnaz Maharaj PA-C  Pager #[phone]    Please page me at [phone] with any questions/concerns during regular weekday hours before 4pm. If there is no response, if it is a weekend, or if it is during evening hours then please page the orthopaedic surgery resident on call.

## 2019-06-10 NOTE — OP NOTE
Date of Service:  6/10/2019       Surgeon:  Ubaldo Orellana MD   Co-surgeon:  Salazar Sebastian MD.  Dr. Sebastian asked me to be co-surgeon given procedural complexity (3-column osteotomy) in setting of multiple previous spinal surgeries.  Assistant:  Shahnaz Maharaj PA-C.      Preoperative Diagnosis:    1.  Flatback syndrome or acquired kyphosis.  2.  Pseudarthrosis T10-11, L2-3.  3.  Multiple previous spinal surgeries, s/p T10-S1 fusion, with retained bilateral posterior segmental instrumentation T10-S1.  4.  History of postoperative spinal infection.      Postoperative Diagnosis:  Same      Procedures:    1.  Pedicle subtraction osteotomy L4.  2.  Revision posterior spinal fusion T10-S1, using local autograft, and crushed cancellous allograft.  3.  Revision posterior bilateral segmental pedicle screw fixation T10-S1; with removal of pre-existing bilateral segmental posterior instrumentation T10-S1.  4.  Bilateral pelvic (S2AI) screw fixation.  5.  Exploration of fusion mass T10-S1.  6.  Computer navigation using O-arm and  Stealth.    7.  Intraoperative neuromonitoring using SSEPs and tcMEPs ([employer]     A -22 modifier is justified for use in this case given multiple previous surgeries, extensive bony overgrowth covering previous instrumentation, and mixed variety of different screw systems that made it very difficult to remove.  We spent significantly more time and effort > 50% usual to remove previous instrumentation and overall performance of the procedure.   Anesthesia:  General endotracheal.   EBL:  2,245 mL (1000 mL cellsaver return)  Complications:  None apparent.  Pro-Axis table break adjustment prior to luis angel placement:  From 0 degrees to 5 degrees to 0 degrees (0 degree arc).  Implants removed:  Bilateral pedicle screws T10-L4 (Globus Creo and [place] systemts) with set screws, rods and connectors.   Implants/equipment used:    1.  [employer]tronic Solera and Ballast screw system:  T10 = 8.5 x 55 mm  bilateral; T11 = 7.5 x 55 mm right, 7.5 x 50 mm left; T12 = 7.5 x 50 mm bilateral; L1 = none on right, 7.5 x 55 mm left; L2 = 6.5 x 55 mm right, 7.5 x 55 mm left; L3 = 8.5 x 55 mm dual-headed right, 7.5 x 55 mm dual-headed left; L4 = none; L5 = 8.5 x 50 mm dual headed right, 8.5 x 50 mm left; S1 = 8.5 x 50 mm right, 8.5 x 50 mm dual-headed left.  Iliac screws = 9.5 x 100 mm bilateral.  Four CoCr rods.  2.  Crushed cancellous allograft 30 mL.  3.  Vancomycin powder 3 gm.  4.  Tranexamic acid 30 mg/kg LD then 10 mg/kg/hr.      Indications:  59 year old male with multiple previous spinal surgeries, culminating in T10-S1 fusion; at least one of the surgeries was complicated by wound infection.  Also had previous medially malpositioned screws that had been removed/revised.  Also with previous L2 kyphoplasty.  Continues to have significant back-related symptoms.  Imaging revealed flatback syndrome, with decreased lumbar lordosis and positive sagittal imbalance.  Also with pseudarthrosis at some levels.  Tried nonoperative treatment, continues to have significant disabling symptoms.  Was seen by Dr. Sebastian, who offered surgery in form of pedicle subtraction osteotomy L4, with pseudarthrosis repair, and pelvic fixatrion.  Consented after thorough discussion of the rationale, risks, benefits and alternatives.  Dr. Sebastian asked me to be co-surgeon for the case given case complexity.      Details:  Properly identified in preop area, site marked, informed consent signed.  Wheeled to OR.  Brief earlier performed.  General anesthesia administered.  Leon inserted.  Neuromonitoring leads placed by [facility].  Antibiotic given: Cefazolin 3gm IV.  Positioned prone on Pro-Axis bed.  Lumbar region squared off, prepped with ChloraPrep and draped in sterile fashion. Surgical timeout performed.  Both Dr. Sebastian and myself were present.  Baseline spinal cord signals taken; showed good signals.     Incised along previous posterior  midline surgical scar.  Revision posterior exposure performed; (+) scarring from previous surgeries.  Exposed previously implanted hardware at known levels (T10-S1); levels also agreed upon by 2 attending surgeons (Dr. Sebastian and myself); thus radiologist confirmation not necessary.  Exposed from T10-pelvis.  There was significant bony overgrowth around the hardware and exposing the screwheads was very difficult, requiring use of osteotomes and nate.  After exposure, we removed posterior instrumentation.  The proximal levels (T10-L2) were Globus Creo screws; we had the instruments for this and were able to remove them easily.  Of note, bilateral T10 screws were grossly loose.  The lower screws, however, were of a different system.  We had a lot more difficulty removing these.  We opened the screw removal set, and tried many different drivers.  We were able to remove the outer locking nuts using either a wrench, a vise , or a female hex .  However, we could not remove the screws.  Some of the screws were fixed head screws, and these were easily removed with convex saddle drivers.  However, the polyaxial screws turned out to be very problematic.  After a long time, we were able to figure out they were [place] screws.  We obtained a removal set from a different hospital, got it flash autoclaved, and were able to use the instruments and remove the screws.    Exploration of fusion mass revealed motion at L2-3 and at T10-11, indicating pseudarthrosis or nonunion.    We placed revision screws T10-S1, including pelvic fixation.  Spinous process clamp with gregg frame attached at ~ T12.  O-arm 3D scan obtained T11-L3 (we thought we got up to T10 but did not).  Used navigation to placed bilateral pedicle screws T11-L3.  Most of the pedicles had previous tracks.  We confirmed with gregg probe.  If the tracks seemed acceptable, we used them and placed a wider screw than previous.  Used stealth to maximize screw length.   If the tracks were medial, we redirected them using gregg drill.  Undertapped by 1 size, to depth of screw.  At right L1, the previous track was medial; we tried to revise, but the new screw tended to go either lateral or medial.  We decided to abort screw placement at right L1.  At L2, there was previous kyphoplasty.  We drilled thru the cement and placed the screw in similar fashion, using gregg.  Check scan showed good screw placement T11-L3.  We also used this scan to placed T10 bilateral screws using gregg.  Since we basically used the same old screw tracks, we did not do a check scan on these screws anymore.    3D scan of L5-pelvis taken.  Placed bilateral L5 screws.  We had wanted dual headed screws bilaterally; able to do so easily on right side.  However, on the left, we had to redirect the old screw track to a more medial trajectory.  There was significant soft tissue that was getting caught with the dual head; however, when we loosened the 's  on the head, the screw tended to follow the old more lateral trajectory.  We thus aborted and ended up placing a conventional screw at left L5.  We instead placed a dual headed screw at left S1; single-head at right S1.  We used old S1 screw tracks.  Placed bilateral L3 dual-headed screws.  Placed bilateral iliac screws.  Because of shape of pelvis, these screws were not really purchasing the sacrum.  Used gregg awl to creat  hole and track; deepened using gregg drill with 90 mm stop.  Tapped with 6.5 and 9.5mm, to depth of screw.  Placed 9.5 x 100 mm screws using gregg .  Check scan showed good screw placement.  Spinal cord signals stable.     Pedicle subtraction osteotomy L4 performed.  Using the screws as landmarks, we first removed dorsal bone from below the L3 pedicles to the top of the L5 pedicles.  There was robust and solid dorsal fusion mass.  However, there did not seem to have been previous decompression performed; thus, there was good plane  around the dura, without significant epidural scarring.  We completely unroofed bilateral L3-4 and L4-5 foramina ensuring good decompression of bilateral L3 and L4 nerve roots.  We skeletonized bilateral L4 pedicles.  We removed the lamina / dorsal fusion mass and underlying ligamentum flavum, thus completely unroofing the spinal canal and thecal sac.  Bone saved for grafting.  Epidural bleeders controlled using bipolar.  Resected bilateral pedicles flush to the posterior vertebral body.  We released bilateral transverse processes.  We dissected the lateral vertebral body walls and placed square gelfoam.  Placed right spoon retractor; however, we noted that this was putting tension on the exiting L3 nerve root, so we decided not to use this anymore.  We used osteotomes to make our vertebral body cuts, to remove a posterior-based wedge of bone.  We did this bilaterally; removed bone in between using pituitaries.  Lateral wall removed using same.  Midline intervening bone taken down using posterior wall impactor.  After doing this, the osteotomy started to partially close down indicating completion of our osteotomy, leaving only an anterior hinge.  We placed a temporary short luis angel on the right side.  Motor signals stable.      Stitched lateral images showed lumbar lordosis approx 46 degrees, which was higher than our target of around 36-40 degrees.  We decided to dial down the correction.  By the time we measured, we had started placing a long luis angel on the left side.  We flexed to bed 10 degrees (from 0), loosened the set screws, and opened up the osteotomy using lamina .  We also placed a right-sided long luis angel.  Repeat lat stitched image showed lumbar lordosis of ~ 36 degrees.  We felt that we may have undercorrected.  We extended the bed back to 0 degrees.  We loosened the set screws and performed gentle compression across the rods at the osteotomy level.  Repeat stitched image showed lordosis approx 39-40  degrees; we decided to accept this.  Accessory rods placed bilaterally, so there are 4 rods crossing the osteotomy site.  Final tightening performed.  Spinal cord signals stable.       Irrigation.  We prepared posterior fusion bed (lamina) T10-S1; removed soft tissues; bone decorticated using nate.  Morcellized local autograft placed over decorticated elements T10-S1, followed by crushed cancellous allograft.      Vancomycin 3gm applied (80% deep, 20% subcutaneous).  Deep Fr. 15 drain placed; subcutaneous medium Hemovac drain placed.  Closure performed: deep fascia with Vicryl 0 popoffs, reinforced with running vicryl #1; subcutaneous with vicryl 0 popoffs, and running Vicryl 2-0.  Skin with nylon 3-0 in vertical mattress fashion.  Aquacell dressing and tegaderm.     Tolerated procedure well.  Turned supine, taken off general anesthetic, transferred to PACU in satisfactory condition.  We noted some skin excoriation anterior axilla 2' to use of bath towels.    Postop:  Admit to surgical floor.  Antibiotics x 24 hours.  Mechanical DVT prophylaxis.  Advance diet as tolerated.  No lifting more than 10 pounds, no excessive bending or twisting.  Hospitalist comanagement for medical issues.  PT and OT consult.  Full spine standing x-rays (incl femoral heads) prior to discharge.  Follow-up c/o Dr. Sebastian.

## 2019-06-10 NOTE — OR NURSING
MDA notified of BP being elevated. WE checked a cuff pressure. Ketamine turned off due to high blood pressure.

## 2019-06-11 ENCOUNTER — APPOINTMENT (OUTPATIENT)
Dept: PHYSICAL THERAPY | Facility: CLINIC | Age: 59
DRG: 457 | End: 2019-06-11
Attending: PHYSICIAN ASSISTANT
Payer: OTHER MISCELLANEOUS

## 2019-06-11 ENCOUNTER — APPOINTMENT (OUTPATIENT)
Dept: GENERAL RADIOLOGY | Facility: CLINIC | Age: 59
DRG: 457 | End: 2019-06-11
Attending: PHYSICIAN ASSISTANT
Payer: OTHER MISCELLANEOUS

## 2019-06-11 LAB
ALBUMIN UR-MCNC: 10 MG/DL
ANION GAP SERPL CALCULATED.3IONS-SCNC: 2 MMOL/L (ref 3–14)
APPEARANCE UR: CLEAR
BASOPHILS # BLD AUTO: 0 10E9/L (ref 0–0.2)
BASOPHILS NFR BLD AUTO: 0.1 %
BILIRUB UR QL STRIP: NEGATIVE
BUN SERPL-MCNC: 14 MG/DL (ref 7–30)
CA-I BLD-MCNC: 4.4 MG/DL (ref 4.4–5.2)
CALCIUM SERPL-MCNC: 7.7 MG/DL (ref 8.5–10.1)
CHLORIDE SERPL-SCNC: 106 MMOL/L (ref 94–109)
CO2 SERPL-SCNC: 30 MMOL/L (ref 20–32)
COLOR UR AUTO: YELLOW
CREAT SERPL-MCNC: 1.27 MG/DL (ref 0.66–1.25)
DIFFERENTIAL METHOD BLD: ABNORMAL
EOSINOPHIL # BLD AUTO: 0 10E9/L (ref 0–0.7)
EOSINOPHIL NFR BLD AUTO: 0.1 %
ERYTHROCYTE [DISTWIDTH] IN BLOOD BY AUTOMATED COUNT: 13.2 % (ref 10–15)
ERYTHROCYTE [DISTWIDTH] IN BLOOD BY AUTOMATED COUNT: 13.3 % (ref 10–15)
GFR SERPL CREATININE-BSD FRML MDRD: 61 ML/MIN/{1.73_M2}
GLUCOSE SERPL-MCNC: 126 MG/DL (ref 70–99)
GLUCOSE UR STRIP-MCNC: NEGATIVE MG/DL
HCT VFR BLD AUTO: 31.5 % (ref 40–53)
HCT VFR BLD AUTO: 31.5 % (ref 40–53)
HGB BLD-MCNC: 9.7 G/DL (ref 13.3–17.7)
HGB BLD-MCNC: 9.7 G/DL (ref 13.3–17.7)
HGB UR QL STRIP: ABNORMAL
HYALINE CASTS #/AREA URNS LPF: 1 /LPF (ref 0–2)
IMM GRANULOCYTES # BLD: 0.1 10E9/L (ref 0–0.4)
IMM GRANULOCYTES NFR BLD: 0.5 %
KETONES UR STRIP-MCNC: 5 MG/DL
LACTATE BLD-SCNC: 1.9 MMOL/L (ref 0.7–2)
LACTATE BLD-SCNC: 2.7 MMOL/L (ref 0.7–2)
LEUKOCYTE ESTERASE UR QL STRIP: NEGATIVE
LYMPHOCYTES # BLD AUTO: 0.8 10E9/L (ref 0.8–5.3)
LYMPHOCYTES NFR BLD AUTO: 8.6 %
MCH RBC QN AUTO: 28.9 PG (ref 26.5–33)
MCH RBC QN AUTO: 28.9 PG (ref 26.5–33)
MCHC RBC AUTO-ENTMCNC: 30.8 G/DL (ref 31.5–36.5)
MCHC RBC AUTO-ENTMCNC: 30.8 G/DL (ref 31.5–36.5)
MCV RBC AUTO: 94 FL (ref 78–100)
MCV RBC AUTO: 94 FL (ref 78–100)
MONOCYTES # BLD AUTO: 0.7 10E9/L (ref 0–1.3)
MONOCYTES NFR BLD AUTO: 8 %
MUCOUS THREADS #/AREA URNS LPF: PRESENT /LPF
NEUTROPHILS # BLD AUTO: 7.6 10E9/L (ref 1.6–8.3)
NEUTROPHILS NFR BLD AUTO: 82.7 %
NITRATE UR QL: NEGATIVE
NRBC # BLD AUTO: 0 10*3/UL
NRBC BLD AUTO-RTO: 0 /100
PH UR STRIP: 6 PH (ref 5–7)
PLATELET # BLD AUTO: 103 10E9/L (ref 150–450)
PLATELET # BLD AUTO: 117 10E9/L (ref 150–450)
POTASSIUM SERPL-SCNC: 4.3 MMOL/L (ref 3.4–5.3)
RBC # BLD AUTO: 3.36 10E12/L (ref 4.4–5.9)
RBC # BLD AUTO: 3.36 10E12/L (ref 4.4–5.9)
RBC #/AREA URNS AUTO: 5 /HPF (ref 0–2)
SODIUM SERPL-SCNC: 139 MMOL/L (ref 133–144)
SOURCE: ABNORMAL
SP GR UR STRIP: 1.03 (ref 1–1.03)
SQUAMOUS #/AREA URNS AUTO: <1 /HPF (ref 0–1)
UROBILINOGEN UR STRIP-MCNC: NORMAL MG/DL (ref 0–2)
WBC # BLD AUTO: 9.3 10E9/L (ref 4–11)
WBC # BLD AUTO: 9.8 10E9/L (ref 4–11)
WBC #/AREA URNS AUTO: 2 /HPF (ref 0–5)

## 2019-06-11 PROCEDURE — 99207 ZZC CONSULT E&M CHANGED TO INITIAL LEVEL: CPT | Performed by: PHYSICIAN ASSISTANT

## 2019-06-11 PROCEDURE — 97530 THERAPEUTIC ACTIVITIES: CPT | Mod: GP

## 2019-06-11 PROCEDURE — 25000132 ZZH RX MED GY IP 250 OP 250 PS 637: Performed by: PHYSICIAN ASSISTANT

## 2019-06-11 PROCEDURE — 87040 BLOOD CULTURE FOR BACTERIA: CPT | Performed by: INTERNAL MEDICINE

## 2019-06-11 PROCEDURE — 97161 PT EVAL LOW COMPLEX 20 MIN: CPT | Mod: GP

## 2019-06-11 PROCEDURE — 25800030 ZZH RX IP 258 OP 636: Performed by: INTERNAL MEDICINE

## 2019-06-11 PROCEDURE — 12000001 ZZH R&B MED SURG/OB UMMC

## 2019-06-11 PROCEDURE — 25000132 ZZH RX MED GY IP 250 OP 250 PS 637: Performed by: INTERNAL MEDICINE

## 2019-06-11 PROCEDURE — 99232 SBSQ HOSP IP/OBS MODERATE 35: CPT | Performed by: NURSE PRACTITIONER

## 2019-06-11 PROCEDURE — 25000128 H RX IP 250 OP 636: Performed by: PHYSICIAN ASSISTANT

## 2019-06-11 PROCEDURE — 83605 ASSAY OF LACTIC ACID: CPT | Performed by: INTERNAL MEDICINE

## 2019-06-11 PROCEDURE — 25000132 ZZH RX MED GY IP 250 OP 250 PS 637: Performed by: NURSE PRACTITIONER

## 2019-06-11 PROCEDURE — 81001 URINALYSIS AUTO W/SCOPE: CPT | Performed by: INTERNAL MEDICINE

## 2019-06-11 PROCEDURE — 97116 GAIT TRAINING THERAPY: CPT | Mod: GP

## 2019-06-11 PROCEDURE — 72080 X-RAY EXAM THORACOLMB 2/> VW: CPT

## 2019-06-11 PROCEDURE — 85027 COMPLETE CBC AUTOMATED: CPT | Performed by: INTERNAL MEDICINE

## 2019-06-11 PROCEDURE — 36415 COLL VENOUS BLD VENIPUNCTURE: CPT | Performed by: INTERNAL MEDICINE

## 2019-06-11 PROCEDURE — 80048 BASIC METABOLIC PNL TOTAL CA: CPT | Performed by: PHYSICIAN ASSISTANT

## 2019-06-11 PROCEDURE — 99222 1ST HOSP IP/OBS MODERATE 55: CPT | Performed by: PHYSICIAN ASSISTANT

## 2019-06-11 PROCEDURE — 36415 COLL VENOUS BLD VENIPUNCTURE: CPT | Performed by: NURSE PRACTITIONER

## 2019-06-11 PROCEDURE — 83605 ASSAY OF LACTIC ACID: CPT | Performed by: NURSE PRACTITIONER

## 2019-06-11 PROCEDURE — 25000128 H RX IP 250 OP 636: Performed by: INTERNAL MEDICINE

## 2019-06-11 PROCEDURE — 36415 COLL VENOUS BLD VENIPUNCTURE: CPT | Performed by: PHYSICIAN ASSISTANT

## 2019-06-11 PROCEDURE — 85025 COMPLETE CBC W/AUTO DIFF WBC: CPT | Performed by: PHYSICIAN ASSISTANT

## 2019-06-11 PROCEDURE — 82330 ASSAY OF CALCIUM: CPT | Performed by: INTERNAL MEDICINE

## 2019-06-11 RX ORDER — ZOLPIDEM TARTRATE 6.25 MG/1
12.5 TABLET, FILM COATED, EXTENDED RELEASE ORAL AT BEDTIME
Status: DISCONTINUED | OUTPATIENT
Start: 2019-06-11 | End: 2019-06-18 | Stop reason: HOSPADM

## 2019-06-11 RX ORDER — HYDROMORPHONE HYDROCHLORIDE 2 MG/1
4-6 TABLET ORAL
Status: DISCONTINUED | OUTPATIENT
Start: 2019-06-11 | End: 2019-06-13

## 2019-06-11 RX ORDER — AMOXICILLIN 250 MG
1 CAPSULE ORAL 4 TIMES DAILY PRN
Status: DISCONTINUED | OUTPATIENT
Start: 2019-06-11 | End: 2019-06-18 | Stop reason: HOSPADM

## 2019-06-11 RX ORDER — GABAPENTIN 800 MG/1
800 TABLET ORAL 4 TIMES DAILY
Status: DISCONTINUED | OUTPATIENT
Start: 2019-06-11 | End: 2019-06-13

## 2019-06-11 RX ORDER — HYDROMORPHONE HYDROCHLORIDE 2 MG/1
4 TABLET ORAL
Status: DISCONTINUED | OUTPATIENT
Start: 2019-06-11 | End: 2019-06-11

## 2019-06-11 RX ORDER — HYDROMORPHONE HCL/0.9% NACL/PF 0.2MG/0.2
.2-.4 SYRINGE (ML) INTRAVENOUS
Status: DISCONTINUED | OUTPATIENT
Start: 2019-06-11 | End: 2019-06-12

## 2019-06-11 RX ORDER — METHOCARBAMOL 500 MG/1
500 TABLET, FILM COATED ORAL EVERY 6 HOURS PRN
Status: DISCONTINUED | OUTPATIENT
Start: 2019-06-11 | End: 2019-06-18 | Stop reason: HOSPADM

## 2019-06-11 RX ORDER — ACETAMINOPHEN 325 MG/1
975 TABLET ORAL EVERY 8 HOURS SCHEDULED
Status: DISCONTINUED | OUTPATIENT
Start: 2019-06-11 | End: 2019-06-18 | Stop reason: HOSPADM

## 2019-06-11 RX ADMIN — SODIUM CHLORIDE: 9 INJECTION, SOLUTION INTRAVENOUS at 18:22

## 2019-06-11 RX ADMIN — ACETAMINOPHEN 975 MG: 325 TABLET, FILM COATED ORAL at 18:21

## 2019-06-11 RX ADMIN — DOCUSATE SODIUM 100 MG: 100 CAPSULE, LIQUID FILLED ORAL at 20:55

## 2019-06-11 RX ADMIN — ESCITALOPRAM OXALATE 20 MG: 20 TABLET ORAL at 08:36

## 2019-06-11 RX ADMIN — SODIUM CHLORIDE: 9 INJECTION, SOLUTION INTRAVENOUS at 10:56

## 2019-06-11 RX ADMIN — GABAPENTIN 800 MG: 800 TABLET, FILM COATED ORAL at 17:31

## 2019-06-11 RX ADMIN — ZOLPIDEM TARTRATE 12.5 MG: 6.25 TABLET, EXTENDED RELEASE ORAL at 01:45

## 2019-06-11 RX ADMIN — CEFAZOLIN SODIUM 2 G: 2 INJECTION, SOLUTION INTRAVENOUS at 20:55

## 2019-06-11 RX ADMIN — CEFAZOLIN SODIUM 2 G: 2 INJECTION, SOLUTION INTRAVENOUS at 01:44

## 2019-06-11 RX ADMIN — RANITIDINE 150 MG: 150 TABLET ORAL at 12:12

## 2019-06-11 RX ADMIN — SODIUM CHLORIDE: 9 INJECTION, SOLUTION INTRAVENOUS at 01:42

## 2019-06-11 RX ADMIN — HYDROMORPHONE HYDROCHLORIDE 4 MG: 2 TABLET ORAL at 20:55

## 2019-06-11 RX ADMIN — SENNOSIDES AND DOCUSATE SODIUM 2 TABLET: 8.6; 5 TABLET ORAL at 20:55

## 2019-06-11 RX ADMIN — Medication: at 00:49

## 2019-06-11 RX ADMIN — ACETAMINOPHEN 975 MG: 325 TABLET, FILM COATED ORAL at 05:43

## 2019-06-11 RX ADMIN — HYDROMORPHONE HYDROCHLORIDE 4 MG: 2 TABLET ORAL at 12:41

## 2019-06-11 RX ADMIN — TRAZODONE HYDROCHLORIDE 150 MG: 100 TABLET ORAL at 21:02

## 2019-06-11 RX ADMIN — SENNOSIDES AND DOCUSATE SODIUM 2 TABLET: 8.6; 5 TABLET ORAL at 08:36

## 2019-06-11 RX ADMIN — ZOLPIDEM TARTRATE 12.5 MG: 6.25 TABLET, EXTENDED RELEASE ORAL at 21:02

## 2019-06-11 RX ADMIN — SENNOSIDES AND DOCUSATE SODIUM 2 TABLET: 8.6; 5 TABLET ORAL at 00:09

## 2019-06-11 RX ADMIN — TRAZODONE HYDROCHLORIDE 150 MG: 100 TABLET ORAL at 00:51

## 2019-06-11 RX ADMIN — ACETAMINOPHEN 975 MG: 325 TABLET, FILM COATED ORAL at 00:09

## 2019-06-11 RX ADMIN — GABAPENTIN 800 MG: 800 TABLET, FILM COATED ORAL at 12:12

## 2019-06-11 RX ADMIN — HYDROMORPHONE HYDROCHLORIDE 4 MG: 2 TABLET ORAL at 17:31

## 2019-06-11 RX ADMIN — SODIUM CHLORIDE 1000 ML: 9 INJECTION, SOLUTION INTRAVENOUS at 00:14

## 2019-06-11 RX ADMIN — CEFAZOLIN SODIUM 2 G: 2 INJECTION, SOLUTION INTRAVENOUS at 08:34

## 2019-06-11 RX ADMIN — HYDROMORPHONE HYDROCHLORIDE 0.5 MG: 1 INJECTION, SOLUTION INTRAMUSCULAR; INTRAVENOUS; SUBCUTANEOUS at 00:01

## 2019-06-11 RX ADMIN — PROMETHAZINE HYDROCHLORIDE 25 MG: 25 TABLET ORAL at 08:36

## 2019-06-11 RX ADMIN — GABAPENTIN 800 MG: 800 TABLET, FILM COATED ORAL at 08:36

## 2019-06-11 RX ADMIN — OMEPRAZOLE 20 MG: 20 CAPSULE, DELAYED RELEASE ORAL at 08:35

## 2019-06-11 RX ADMIN — GABAPENTIN 800 MG: 800 TABLET, FILM COATED ORAL at 20:55

## 2019-06-11 RX ADMIN — METHOCARBAMOL 500 MG: 500 TABLET, FILM COATED ORAL at 18:21

## 2019-06-11 ASSESSMENT — ACTIVITIES OF DAILY LIVING (ADL)
ADLS_ACUITY_SCORE: 10
ADLS_ACUITY_SCORE: 11
ADLS_ACUITY_SCORE: 10
ADLS_ACUITY_SCORE: 11
ADLS_ACUITY_SCORE: 12
ADLS_ACUITY_SCORE: 10

## 2019-06-11 ASSESSMENT — PAIN DESCRIPTION - DESCRIPTORS
DESCRIPTORS: ACHING
DESCRIPTORS: ACHING;CONSTANT
DESCRIPTORS: ACHING;CONSTANT

## 2019-06-11 NOTE — PROGRESS NOTES
[facility]  Transfer Triage Note    Date of call: 6/10/2019  Time of call: 9pm    Reason for Transfer: higher level of care  Diagnosis: lactate elevation, acute blood loss from surgery    Outside Records: Available     Stability of Patient: stable at time of call for transfer    Expected Time of Arrival for Transfer: less than 24 hours    Mr. Olvera is a 58 y/o male admitted to [facility] for planned orthopedic surgical procedure including a T10-pelvis fusion. He reportedly had 2200cc blood loss during surgery. Post op in PACU, labs rechecked and lactic acid elevated, intermittent soft blood pressures to 80s/40s-50s. Dr. Escobar requesting admission to [place] for higher level of care and closer monitoring postoperatively in the event that he clinically worsens.    Blood pressures monitored during call and after call and msot recent blood pressure 112/55 by blood pressure cuff and 111/82 by art line.     Patient accepted to [facility] bed.     Eboni Balderrama MD  [phone]

## 2019-06-11 NOTE — OR NURSING
Patient transferred to [place] by [facility]. PCA stopped per EMS request. To be started again by [place] Nurses. Daughter Latonia updated on transfer

## 2019-06-11 NOTE — PLAN OF CARE
Neuro: A&Ox4. Pleasant, calm and cooperative. CMS intact.   Cardiac: No tele. HR 90s. SBP low 100s. T max 99.1- scheduled tylenol given   Respiratory: Sating >95% on 2L NC.  GI/: Adequate urine output via urinal. No BM this shift. Not passing flatus  Diet/appetite: Tolerating regular diet. Good appetite   Activity:  Assist of 1, up to chair and in halls.  Pain: Complains of pain related to positioning on surgical table and incisional back pain. PCA dilaudid in place.   Skin: Redness and excoriation bilateral armpits/shoulders and chest redness due to positioning on surgical table. Lower back incision dressing CDI  LDA's: STAN, Hemovac, bilateral PIV    Plan: Continue with POC. Notify primary team with changes.

## 2019-06-11 NOTE — PROGRESS NOTES
Pt transferred to Mobile Unit 10[facility] via [facility]. Report called by GHADA Zamora on 6B. Chart and meds sent with pt. No belongings present. Pt alert and oriented, ambulating to bathroom. Drains intact and draining well. Diludid given just prior to transport.

## 2019-06-11 NOTE — PLAN OF CARE
Discharge Planner PT   Patient plan for discharge: Home with family assist  Current status: Requires mod A for bed mobility. CGA for sit<>stands and amb 3x short distance bouts with FWW/CGA. VSS with activity.  Barriers to return to prior living situation: Weakness, pain  Recommendations for discharge: Home with family assist and possibly home PT pending stair trial  Rationale for recommendations: Current level of function and expected improvement       Entered by: León Schultz 06/11/2019 3:53 PM

## 2019-06-11 NOTE — DISCHARGE SUMMARY
ORTHOPAEDIC DISCHARGE SUMMARY     Date of Admission: 6/10/2019  Date of Discharge: 6/18/2019  8:21 AM  Disposition: Home  Staff Physician: Salazar Sebastian*  Primary Care Provider: Sarah Daly    DISCHARGE DIAGNOSIS:  Lumbar Stenosis and Neurogenic Claudication,Flatbak Syndrome Of Lumbar Region    PROCEDURES: Procedure(s):  Revision Thoracic 10-Pelvis Posterior Instrumented Fusion With O-Arm.Stealth, Lumbar 4 Pedicle Subtraction Osteotomy, Removal Of Prior Instrumentation on 6/10/2019    BRIEF HISTORY:  John Olvera is a 59 year old male who elected surgical treatment, and understood the indications for this surgery, as well as its risks, benefits, and alternatives as documented in the pre-operative H&P.  Specifically, we reviewed the risks and benefits of the surgery in detail. The risks include, but are not limited to, the general risks associated with anesthesia, including death, pulmonary embolism, DVT, stroke, myocardial infarction, pneumonia, and urinary tract infection. Additional risks specific to the surgery include the risk of infection, failure to heal (non-union), dural tear with resultant CSF leak which might necessitate placement of a drain or revision surgery or could result in headaches, nerve injury resulting in weakness or paralysis, risk of adjacent segment disease, the risks of vascular injury, need for revision surgery in the future due to one of the above issues, or risk of incomplete symptom relief. John Olvera understands the risks of the surgery and wishes to proceed. No guarantees were given.    HOSPITAL COURSE:    Surgery was uncomplicated.  Poat-operatively he has elevated lactate so was transferred to the [facility] for closer medical management. Otherwise he did well post-op. Medicine was consulted post operatively to aid in management of medical comorbidities. See final recommendations below. The patient received routine nursing cares and is medically stable. Vital signs  are stable. The patient is tolerating a regular diet without GI distress/nausea or vomiting. Voiding spontaneously. All PT/OT goals have been met for safe mobility. Pain is now controlled on oral medications which will be available on discharge. Stool softeners have been used while taking pain medications to help prevent constipation. John Olvera is deemed medically safe to discharge.     Antibiotics:  given periop and 24 hours postop.  DVT prophylaxis:  none  PT Progress: Has met PT/OT goals for safe mobility.    Pain Meds:  Weaned off all IV pain meds by discharge.  Inpatient Events: No significant events or complications.     Discharge orders and instructions as below.    FOLLOWUP:    Follow up with Dr. Sebastian at 6 weeks postoperatively.  Future Appointments   Date Time Provider Department Center   7/26/2019  1:20 PM Salazar Sebastian MD [facility]       Appointments on [facility]. Call [phone] if you haven't heard regarding these appointments within 7 days of discharge.    PLANNED DISCHARGE ORDERS:           Discharge Medication List as of 6/17/2019 11:18 AM      START taking these medications    Details   !! HYDROmorphone (DILAUDID) 4 MG tablet Take 1-1.5 tablets (4-6 mg) by mouth every 3 hours as needed for moderate to severe pain, Disp-50 tablet, R-0, Local Print      methocarbamol (ROBAXIN) 500 MG tablet Take 1 tablet (500 mg) by mouth every 6 hours as needed for muscle spasms (pain), Disp-20 tablet, R-0, E-Prescribe      !! senna-docusate (SENOKOT-S/PERICOLACE) 8.6-50 MG tablet Take 2 tablets by mouth 2 times daily, Disp-20 tablet, R-0, E-Prescribe       !! - Potential duplicate medications found. Please discuss with provider.      CONTINUE these medications which have NOT CHANGED    Details   BusPIRone HCl (BUSPAR PO) Take 15 mg by mouth daily (with dinner), Historical      Escitalopram Oxalate (LEXAPRO PO) Take 20 mg by mouth daily, Historical       gabapentin (NEURONTIN) 800 MG tablet Take 800 mg by mouth 4 times daily, Historical      !! HYDROmorphone HCl (DILAUDID PO) Take 8 mg by mouth 4 times daily, Historical      OMEPRAZOLE PO Take 20 mg by mouth every morning, Historical      promethazine (PHENERGAN) 25 MG tablet Take 25 mg by mouth every morning, Historical      !! senna-docusate (SENOKOT-S;PERICOLACE) 8.6-50 MG per tablet Take 1 tablet by mouth 4 times daily as needed for constipation, Historical      tiZANidine (ZANAFLEX) 4 MG tablet Take 4 mg by mouth 2 times daily as needed , Historical      TRAZODONE HCL PO Take 150 mg by mouth At Bedtime, Historical      VITAMIN D, CHOLECALCIFEROL, PO Take 5,000 Units by mouth daily, Historical      Zolpidem Tartrate (AMBIEN CR PO) Take 12.5 mg by mouth At Bedtime, Historical       !! - Potential duplicate medications found. Please discuss with provider.      STOP taking these medications       ibuprofen (ADVIL/MOTRIN) 200 MG tablet Comments:   Reason for Stopping:         ondansetron (ZOFRAN-ODT) 4 MG ODT tab Comments:   Reason for Stopping:                 Discharge Procedure Orders   Reason for your hospital stay   Order Comments: Spinal surgery     Activity   Order Comments: Your activity upon discharge: activity as tolerated. No bending or twisting or lifting greater than 10 lbs until cleared by your operative surgeon.     Order Specific Question Answer Comments   Is discharge order? Yes      Discharge Instructions   Order Comments: POSTOPERATIVE INSTRUCTIONS: SPINE SURGERY  Dr. Salazar Sebastian    [facility]  [address] [place], [place] [id number] [phone]    FOLLOW UP APPOINTMENT  You are scheduled for a post operative appointment with your surgery between 2 and 6 weeks from surgery.    WOUND CARE AND SHOWERING  Dressing:   You have a dressing which should be worn for up to 7 days. Once the dressing has been removed, you do not need a dressing. There are steri strips directly  over the incision. Those may stay in place until they fall off, which can take up to 2 weeks. It is okay to shower and wash gently with soap and water. Do not soak in the bath. No pools, hot tubs, or lakes for 6 weeks.    Showering:   You may shower 48 hours after surgery provided the Aquacel dressing is in place. Although you are strictly prohibited from soaking or submerging the surgical wound underwater, you may shower in the usual fashion, allowing water and soap to run over the Aquacel. Once the Aquacel is removed on the seventh day after surgery, you may continue to shower; however, the incision should be covered with saran wrap (or any other non-permeable covering) to allow the incision to remain dry and to prevent you from scrubbing/rubbing the incision. The steri-strips (small white tape that is directly on the incision areas) should be left on until they fall off or are removed at your first office visit. After your incision is examined at your first office visit, you will likely be allowed to return to showering without covering the incision.     Tub bathing:  Tub bathing, swimming, or any other activities that cause your incision to be submerged should be avoided until allowed by your provider. Typically, patients are allowed to return to these activities six weeks after surgery pending clearance by your provider.    ACTIVITY  Weight bearing status:  No restrictions with walking.  You will not need to attend physical therapy. You can focus on cardiovascular fitness by walking as much as you can tolerate. Avoid bending, lifting, and twisting. Your weight lifting restriction is 10 pounds until your first follow-up appointment.    Athletic Activities:  Activities such as swimming, bicycling, jogging, running, and stop-and-go sports should be avoided until permitted by your provider.    Driving:  Driving is not permitted until directed by your provider. Typically, driving is restricted for three to four  weeks after right knee surgery and three weeks after left knee surgery. Under no circumstance are you permitted to drive while using narcotic pain medications.    Return to Work:  You may return to work when directed by your provider. Typically, patients with desk/sitting jobs can return to work within two weeks while patients with heavy labor jobs can return to work around three months after surgery.    COMFORT AND PAIN MANAGEMENT  Icing: An ice pack will be provided to control swelling and discomfort after surgery. Place a thin towel on your skin and apply the ice pack overtop. You may apply ice for 20 minutes as often as two times per hour.    Pain Medications:  You will be discharged with acetaminophen (Tylenol) and a narcotic medication for pain management after surgery. Acetaminophen is most effective when it is taken per the schedule outlined by your provider (every four, six, or eight hours as prescribed). You may safely use acetaminophen as prescribed for the first four weeks after surgery provided you do not exceed the maximum daily dose prescribed by your provider (usually 3000 mg - 4000 mg). The narcotic pain medication should only be taken on an as-needed basis when necessary and should be reserved for severe pain that is not controlled with scheduled acetaminophen. In the first three days following surgery, your symptoms may warrant use of the narcotic pain medication every three, four, or six hours as prescribed. After three days, focus your efforts on decreasing (tapering) use of narcotic medications.   The most successful tapering strategy is to first, decrease the dose (number of tablets) and second, decrease the interval (time between doses). For example, if you begin taking two tablets every four hours after surgery, start your taper by decreasing one of these doses to one tablet. Every one to two days, decrease another dose to one tablet until you are eventually taking one tablet every four  hours. Once this is achieved, focus on increasing the number of hours between doses, moving from one tablet every four hours to one tablet every six hours. As tolerated, continue to increase the interval to eight and twelve hours. Eventually, taper to one dose every evening and discontinue when no longer needed. To avoid constipation, please use an over-the-counter stool softener or drink lots of water and eat fruits and vegetables. Avoid operating heavy machinery or driving an automobile while on narcotic medications. These medications have numerous side effects including nausea,   constipation, and drowsiness. If you experience nausea, this may be relieved by taking the medication with food or a light meal.  You may not take non-steroidal anti-inflammatory medications (eg: Advil, Ibuprofen, Aleve, others) for the first 3 months after surgery as it interferes with bone healing.     ANTICOAGULATION  none.    DIET:  When you get home, you may resume your normal diet as tolerated. You may not be very hungry but try to eat small healthy meals to help you heal. Remember to drink plenty of water/fluids to help keep you hydrated.    After surgery, you may have a sensitive scar.  When the dressing has been removed, you may massage the scar to decrease sensitivity and help break down scar tissue. Do this up to 4 times per day.    CONTACTING YOUR PHYSICIAN:  You may experience symptoms that require follow-up before your scheduled appointment. Please contact your surgeon's office if you experience:  1) Pain that persists or worsens in the first few days after surgery  2) Excessive redness or drainage of cloudy or bloody material from the wounds (clear red tinted fluid and some mild drainage should be expected) or drainage after surgery  3) A temperature elevation greater than 101.5   4) Pain, swelling or redness in your calf  5) Numbness or weakness in your leg or foot    Regular business hours (Monday - Friday, 8am -  5pm):  [facility]: [phone]    If you have an emergent concern, contact the Emergency Department at the [facility] - [phone] - or [place] - [phone] - [facility]     Adult [facility] Follow-up and recommended labs and tests   Order Comments: Follow up with Dr Sebastian at 6 weeks as planned.  Make an appointment with your Primary physician for suture removal at 3 weeks after the date of your surgery which was 6- .     Full Code     Order Specific Question Answer Comments   Code status determined by: Discussion with patient/legal decision maker      Diet   Order Comments: Follow this diet upon discharge: Regular     Order Specific Question Answer Comments   Is discharge order? Yes        Henrry Holguin MD   Orthopaedic Surgery Resident, PGY-4

## 2019-06-11 NOTE — CONSULTS
Inpatient Pain Management Service: Consultation      DATE OF CONSULT: June 11, 2019      REASON FOR PAIN CONSULTATION:  John Olvera is a 59 year old male I am seeing in consultation at the request of Shahnaz Maharaj PA-C for evaluation and recommendations for his acute post operative mid to lower back pain s/p T10-pelvis revision fusion and removal of prior instrumentation.      CHIEF PAIN COMPLAINT: mid and lower back pain      ASSESSMENT:  1. Acute post operative mid to lower back pain s/p T10-pelvis revision fusion and removal of prior instrumentation.  [place] [facility] [place] closer monitoring due to lactic acidosis. Previous multiple spine surgeries including T10-S1 fusion and L2 kyphoplasty. Last spine surgery in 2018 was c/b wound infection.  2. Chronic pain of lower back, on chronic opioid management.  States PTA, he takes Dilaudid 4mg, 2 tablets 4x/day, everyday.  He states that he does not skip a dose.  3. H/o GERD  4. H/o depression    -- Outpatient opioid requirements prior to admission: OME =  Dilaudid 4mg, 2 tablets 4x/day,   reviewed.  6/7/19  Gabapentin 800mg #112 tabs for  28 days  5/30/19 hydromorphone 4mg #224 tablets for 28 days    Primary Care Provider: Sarah Daly  Chronic Pain Provider: Dr. Ramírez.  Arielle [place]    TREATMENT RECOMMENDATIONS/PLAN:   1. Discontinue PCA Dilaudid.  2. Discontinue oxycodone order.  3. Start Dilaudid 4mg PO Q 3 hours PRN.  Low threshold to increase to 6mg PO Q 3 hours PRN.     (note-PCA usage of 8 hours prior to visit was an average of 0.2mg/hour of IV Dilaudid).  4. Start Dilaudid 0.2-0.4mg IV Q 2 hours PRN.  5. Discontinue tizanidine order.  6. Start Robaxin 500mg PO Q 6 hours PRN.  7. Start lidocaine patch, 1-3 patches TD Q 24 hours, 12 hours on and 12 hours off.  8. Start menthol patch, 1 patch TD Q8 hours while lidocaine patch is off.  9. Decrease acetaminophen to 975mg PO Q 8 hours.  10. Bowel regimen to prevent opioid  "induced constipation.    Pain Service will Continue to follow at this time.     Recommendations were discussed with medicine team and pain team  Plan was reviewed by the Pain Service consisting of Lilia Caal MD    Thank you for consulting the Inpatient Pain Management Service.   The above recommendations are to be acted upon at the primary team s discretion.    To reach us:  Mon - Friday 8 AM - 3 PM: Pager [phone] (Text Page)  After hours, weekends and holidays: Primary service should call [phone] for the on-call pain specialist    HISTORY OF PRESENT ILLNESS: John Olvera is a 59 year old male with history of GERD, depression, chronic spine pain on chronic opioid management who was admitted on 6/10/19 for T10-pelvis revision fusion and removal of prior instrumentation.    Today, he is lying in bed, able to sit and stand at edge of bed.  He is AAOx3, conversant.  NAD.  Initially upset with his medication regimen, felt like it was not properly managed.  States pain is in the mid to lower back which is sharp and pain in chest which is burning type sensatioin due to positioning from the surgery.  Denies leg pain.   Upset that he was lying on his back in the PACU.  Rates pain as 10/10 on VAS and states that it is constant.  He states that the PCA of Dilaudid 0.2mg was helpful (takes pain from 10 to 6 level).  According to PCA usage, he averaged of 0.2mg of Dilaudid per 1 hour during an 8 hour shift (less than what he used at home).  He states that he did not sleep well last night due to cares and machine alarms but notes he was able to doze off from time to time.    He states that PTA, pain at home was \"miserable\" but notes that with the opioids, he was able to keep pain at 4/10 level.    Reviewed pain medication regimen that he has available to use.  Discussed transitioning from PCA regimen to oral opioid regimen which he was agreeable to using.  Risks and benefits of opioids reviewed with him.          PAIN " HISTORY:   Location: mid to lower back and chest pain  Duration: constant.  Pain intensity: 6-10/10  On VAS  Quality of the pain: burning, sharp  Aggravating factors: movements  Relieving factors : rest, pain medication    CAPA (Clinically Aligned Pain Assessment)  Comfort (How is your pain?): Intolerable  Change in Pain (Since your last medication/intervention?): About the same  Pain Control (How are your pain treatments working?): Partially effective pain control  Functioning (Are you able to do activities to get better?) : Pain keeps me from doing most of what I need to do  Sleep (Does your pain management allow you to sleep or rest?): Awake with occasional pain       FUNCTIONAL STATUS:  Change:      unchanged  Oral intake:     Regular  Bowel function:    Normal  Activity level:     Up at edge of bed  Sleep:      Slept some last night but awaken due to environment  Mood:      Frustrated, upset, somewhat cooperative      REVIEW OF 10 BODY SYSTEMS: 10 point ROS of systems including Constitutional, Eyes, Respiratory, Cardiovascular, Gastroenterology, Genitourinary, Integumentary, Musculoskeletal, Psychiatric were all negative except for pertinent positives noted in my HPI.       INPATIENT MEDICATIONS PERTINENT TO PAIN CONSULT:   Medications related to Pain Management (From now, onward)    Start     Dose/Rate Route Frequency Ordered Stop    06/11/19 0800  polyethylene glycol (MIRALAX/GLYCOLAX) Packet 17 g      17 g Oral DAILY 06/10/19 2308      06/11/19 0800  gabapentin (NEURONTIN) tablet 800 mg      800 mg Oral 4 TIMES DAILY 06/11/19 0013      06/11/19 0100  zolpidem ER (AMBIEN CR) CR tablet 12.5 mg      12.5 mg Oral AT BEDTIME 06/11/19 0013      06/11/19 0012  senna-docusate (SENOKOT-S/PERICOLACE) 8.6-50 MG per tablet 1 tablet      1 tablet Oral 4 TIMES DAILY PRN 06/11/19 0013      06/11/19 0012  tiZANidine (ZANAFLEX) tablet 4 mg      4 mg Oral 2 TIMES DAILY PRN 06/11/19 0013      06/11/19 0000  HYDROmorphone  (DILAUDID) PCA 1 mg/mL OPIOID NAIVE       Intravenous CONTINUOUS 06/10/19 2346      06/10/19 2345  busPIRone (BUSPAR) tablet 15 mg      15 mg Oral DAILY WITH SUPPER 06/10/19 2308      06/10/19 2345  senna-docusate (SENOKOT-S/PERICOLACE) 8.6-50 MG per tablet 2 tablet      2 tablet Oral 2 TIMES DAILY 06/10/19 2308      06/10/19 2330  acetaminophen (TYLENOL) tablet 975 mg      975 mg Oral EVERY 6 HOURS 06/10/19 1622      06/10/19 2315  docusate sodium (COLACE) capsule 100 mg      100 mg Oral 2 TIMES DAILY 06/10/19 2308      06/10/19 2308  diphenhydrAMINE (BENADRYL) capsule 25 mg      25 mg Oral EVERY 6 HOURS PRN 06/10/19 2308      06/10/19 2308  diphenhydrAMINE (BENADRYL) injection 25 mg      25 mg Intravenous EVERY 6 HOURS PRN 06/10/19 2308      06/10/19 2308  bisacodyl (DULCOLAX) Suppository 10 mg      10 mg Rectal DAILY PRN 06/10/19 2308      06/10/19 2308  lidocaine 1 % 0.1-1 mL      0.1-1 mL Other EVERY 1 HOUR PRN 06/10/19 2308      06/10/19 2308  lidocaine (LMX4) cream       Topical EVERY 1 HOUR PRN 06/10/19 2308      06/10/19 1434  oxyCODONE (ROXICODONE) tablet 5 mg      5 mg Oral EVERY 4 HOURS PRN 06/10/19 1434              CURRENT MEDICATIONS:   Current Facility-Administered Medications Ordered in Epic   Medication Dose Route Frequency Provider Last Rate Last Dose     acetaminophen (TYLENOL) tablet 975 mg  975 mg Oral Q6H Shahnaz Maharaj PA-C   975 mg at 06/11/19 0543     benzocaine-menthol (CEPACOL) 15-3.6 MG lozenge 1 lozenge  1 lozenge Buccal Q1H PRN Shahnaz Maharaj PA-C         bisacodyl (DULCOLAX) Suppository 10 mg  10 mg Rectal Daily PRN Shahnaz Maharaj PA-C         busPIRone (BUSPAR) tablet 15 mg  15 mg Oral Daily with supper Shahnaz Maharaj PA-C         ceFAZolin (ANCEF) intermittent infusion 2 g in 100 mL dextrose PRE-MIX  2 g Intravenous Q8H Shahnaz Maharaj PA-C   2 g at 06/11/19 0834     dextrose 5% and 0.45% NaCl + KCl 20 mEq/L infusion   Intravenous Continuous Shahnaz Maharaj PA-C          diphenhydrAMINE (BENADRYL) capsule 25 mg  25 mg Oral Q6H PRN Shahnaz Maharaj PA-C        Or     diphenhydrAMINE (BENADRYL) injection 25 mg  25 mg Intravenous Q6H PRN Shahnaz Maharaj PA-C         docusate sodium (COLACE) capsule 100 mg  100 mg Oral BID Shahnaz Maharaj PA-C         escitalopram (LEXAPRO) tablet 20 mg  20 mg Oral Daily Shahnaz Maharaj PA-C   20 mg at 06/11/19 0836     ranitidine (ZANTAC) tablet 150 mg  150 mg Oral Q12H Shahnaz Maharaj PA-C        Or     famotidine (PEPCID) infusion 20 mg  20 mg Intravenous Q12H Shahnaz Maharaj PA-C         gabapentin (NEURONTIN) tablet 800 mg  800 mg Oral 4x Daily Tima Dyson MD   800 mg at 06/11/19 0836     HYDROmorphone (DILAUDID) PCA 1 mg/mL OPIOID NAIVE   Intravenous Continuous Tima Dyson MD         lidocaine (LMX4) cream   Topical Q1H PRN Shahnaz Maharaj PA-C         lidocaine 1 % 0.1-1 mL  0.1-1 mL Other Q1H PRN Shahnaz Maharaj PA-C         metoclopramide (REGLAN) tablet 10 mg  10 mg Oral Q6H PRN Shahnaz Maharaj PA-C        Or     metoclopramide (REGLAN) injection 10 mg  10 mg Intravenous Q6H PRN Shahnaz Maharaj PA-C         naloxone (NARCAN) injection 0.1-0.4 mg  0.1-0.4 mg Intravenous Q2 Min PRN Shahnaz Maharaj PA-C         omeprazole (priLOSEC) CR capsule 20 mg  20 mg Oral QAM Shahnaz Maharaj PA-C   20 mg at 06/11/19 0835     ondansetron (ZOFRAN-ODT) ODT tab 4 mg  4 mg Oral Q6H PRN Shahnaz Maharaj PA-C        Or     ondansetron (ZOFRAN) injection 4 mg  4 mg Intravenous Q6H PRN Shahnaz Maharaj PA-C         oxyCODONE (ROXICODONE) tablet 5 mg  5 mg Oral Q4H PRN Aurora Beatty MD         polyethylene glycol (MIRALAX/GLYCOLAX) Packet 17 g  17 g Oral Daily Shahnaz Maharaj PA-C         prochlorperazine (COMPAZINE) injection 10 mg  10 mg Intravenous Q6H PRN Shahnaz Maharaj PA-C        Or     prochlorperazine (COMPAZINE) tablet 10 mg  10 mg Oral Q6H PRN Shahnaz Maharaj PA-C         promethazine (PHENERGAN) tablet 25 mg  25 mg Oral QAM  Shahnaz Maharaj PA-C   25 mg at 06/11/19 0836     senna-docusate (SENOKOT-S/PERICOLACE) 8.6-50 MG per tablet 1 tablet  1 tablet Oral 4x Daily PRN Tima Dyson MD         senbill-docusate (SENOKOT-S/PERICOLACE) 8.6-50 MG per tablet 2 tablet  2 tablet Oral BID Shahnaz Maharaj PA-C   2 tablet at 06/11/19 0836     sodium chloride (PF) 0.9% PF flush 3 mL  3 mL Intracatheter q1 min prn Shahnaz Maharaj PA-C         sodium chloride (PF) 0.9% PF flush 3 mL  3 mL Intracatheter Q8H Shahnaz Maharaj PA-C   3 mL at 06/11/19 0842     sodium chloride 0.9% infusion   Intravenous Continuous Tima Dyson  mL/hr at 06/11/19 1056       tiZANidine (ZANAFLEX) tablet 4 mg  4 mg Oral BID PRN Tima Dyson MD         traZODone (DESYREL) tablet 150 mg  150 mg Oral At Bedtime Tima Dyson MD   150 mg at 06/11/19 0051     zolpidem ER (AMBIEN CR) CR tablet 12.5 mg  12.5 mg Oral At Bedtime Tima Dyson MD   12.5 mg at 06/11/19 0145     No current [facility]-ordered outpatient medications on file.           HOME/PREVIOUS MEDICATIONS:   Prior to Admission medications    Medication Sig Start Date End Date Taking? Authorizing Provider   BusPIRone HCl (BUSPAR PO) Take 15 mg by mouth daily (with dinner)   Yes Reported, Patient   Escitalopram Oxalate (LEXAPRO PO) Take 20 mg by mouth daily   Yes Reported, Patient   gabapentin (NEURONTIN) 800 MG tablet Take 800 mg by mouth 4 times daily   Yes Unknown, Entered By History   HYDROmorphone HCl (DILAUDID PO) Take 8 mg by mouth 4 times daily   Yes Reported, Patient   ibuprofen (ADVIL/MOTRIN) 200 MG tablet Take 400 mg by mouth every 8 hours as needed for mild pain   Yes Unknown, Entered By History   OMEPRAZOLE PO Take 20 mg by mouth every morning   Yes Reported, Patient   promethazine (PHENERGAN) 25 MG tablet Take 25 mg by mouth every morning   Yes Reported, Patient   senna-docusate (SENOKOT-S;PERICOLACE) 8.6-50 MG per tablet Take 1 tablet by mouth 4 times daily as needed for  constipation   Yes Reported, Patient   tiZANidine (ZANAFLEX) 4 MG tablet Take 4 mg by mouth 2 times daily as needed  4/23/18  Yes Reported, Patient   TRAZODONE HCL PO Take 150 mg by mouth At Bedtime   Yes Reported, Patient   VITAMIN D, CHOLECALCIFEROL, PO Take 5,000 Units by mouth daily   Yes Reported, Patient   Zolpidem Tartrate (AMBIEN CR PO) Take 12.5 mg by mouth At Bedtime   Yes Reported, Patient           PAST PAIN TREATMENT:   Chronic opioid management      ALLERGIES:    Allergies   Allergen Reactions     Diagnostic X-[employer] Materials Nausea and Vomiting     Patient vomits every time he has the contrast. Has tried medication for nausea and it did not work     Silver Nitrate Rash            PAST MEDICAL AND PSYCHIATRIC HISTORY:    Past Medical History:   Diagnosis Date     Anxiety      Chronic back pain      CKD (chronic kidney disease)      Class 3 severe obesity due to excess calories with serious comorbidity and body mass index (BMI) of 40.0 to 44.9 in adult (H) 10/12/2018     Depression      GERD (gastroesophageal reflux disease)      Lumbar radiculopathy 10/12/2018     Prediabetes            PAST SURGICAL HISTORY:   Past Surgical History:   Procedure Laterality Date     BACK SURGERY  2015    laminectomy     BACK SURGERY      fixation kyphoplasty lumbar spine     BACK SURGERY  2001    spinal fusion     COLONOSCOPY       EPIDIDYMECTOMY  1998     OPTICAL TRACKING SYSTEM FUSION POSTERIOR SPINE THORACIC THREE+ LEVELS N/A 6/10/2019    Procedure: Revision Thoracic 10-Pelvis Posterior Instrumented Fusion With O-Arm.Stealth, Lumbar 4 Pedicle Subtraction Osteotomy, Removal Of Prior Instrumentation;  Surgeon: Salazar Sebastian MD;  Location: [facility] OR           FAMILY HISTORY: family history includes Anesthesia Reaction in his daughter; Bladder Cancer in his brother; Pancreatic Cancer in his father.      HEALTH & LIFESTYLE PRACTICES:   Tobacco:  reports that he quit smoking about 4 months ago. He has a 40.00  pack-year smoking history. He has never used smokeless tobacco.  Alcohol:  reports that he drinks alcohol.  Illicit drugs:  reports that he does not use drugs.      SOCIAL HISTORY:   Social History     Socioeconomic History     Marital status:      Spouse name: Not on file     Number of children: Not on file     Years of education: Not on file     Highest education level: Not on file   Occupational History     Not on file   Social Needs     Financial resource strain: Not on file     Food insecurity:     Worry: Not on file     Inability: Not on file     Transportation needs:     Medical: Not on file     Non-medical: Not on file   Tobacco Use     Smoking status: Former Smoker     Packs/day: 1.00     Years: 40.00     Pack years: 40.00     Last attempt to quit: 2019     Years since quittin.3     Smokeless tobacco: Never Used     Tobacco comment: Quit 2019   Substance and Sexual Activity     Alcohol use: Yes     Binge frequency: Less than monthly     Drug use: No     Sexual activity: Not on file   Lifestyle     Physical activity:     Days per week: Not on file     Minutes per session: Not on file     Stress: Not on file   Relationships     Social connections:     Talks on phone: Not on file     Gets together: Not on file     Attends Yazdanism service: Not on file     Active member of club or organization: Not on file     Attends meetings of clubs or organizations: Not on file     Relationship status: Not on file     Intimate partner violence:     Fear of current or ex partner: Not on file     Emotionally abused: Not on file     Physically abused: Not on file     Forced sexual activity: Not on file   Other Topics Concern     Not on file   Social History Narrative     Not on file         LABORATORY VALUES:   Last Basic Metabolic Panel:  Lab Results   Component Value Date     2019      Lab Results   Component Value Date    POTASSIUM 4.3 2019     Lab Results   Component Value Date     CHLORIDE 106 06/11/2019     Lab Results   Component Value Date    DEEPA 7.7 06/11/2019     Lab Results   Component Value Date    CO2 30 06/11/2019     Lab Results   Component Value Date    BUN 14 06/11/2019     Lab Results   Component Value Date    CR 1.27 06/11/2019     Lab Results   Component Value Date     06/11/2019       CBC results:  Lab Results   Component Value Date    WBC 9.3 06/11/2019     Lab Results   Component Value Date    HGB 9.7 06/11/2019     Lab Results   Component Value Date    HCT 31.5 06/11/2019     Lab Results   Component Value Date     06/11/2019       DIAGNOSTIC TESTS:       Labs above reviewed as well as additional relevant diagnostic studies from the EPIC record.       PHYSICAL EXAMINATION:  VITAL SIGNS:  B/P: 122/50, T: 98.1, P: 85, R: 16    CONSTITUTIONAL/GENERAL APPEARANCE: AAOx3. Conversant.  EYES: EOMI, sclerae clear  ENT/NECK: neck is supple  RESPIRATORY: CTA, non labored breathing  CARDIOVASCULAR: S1 and S2 appreciated  GI:round, nontender, bowel sounds present  MUSCULOSKELETAL/BACK/SPINE/EXTREMITIES: Moving UE and LE spontaneously.  Able to stand, sit, roll to side.     NEURO:  SILT to UE and LE.  SKIN/VASCULAR EXAM:  Dry and warm.  PSYCHIATRIC/BEHAVIORAL/OBSERVATIONS:  No objective signs of pain observed during our interview.   Judgment/Insight -fair   Orientation - x3   Memory -good   Mood and affect - upset, cooperative            TIME SPENT:60 minutes including 30 minutes of face-to-face time counseling him  about his pain management treatment options, and coordinating care with the primary team.    Kourtney Beaver PA-C  June 11, 2019, 11:39 AM  Inpatient Pain Management Service

## 2019-06-11 NOTE — OR NURSING
Ketamine has remained off and pt is doing well on Dilaudid PCA. Pt has also had 4mg po dilaudid at 1800. Pt was insistent that the jacobsen be removed. Dr. Sebastian was by to speak with the family and agreed to have the jacobsen removed. Samreen removed without s/s of hematoma at 2014.    Pt has not urinated since removal at 2000. Family has left now to go and get some food. They plan to meet pt on the [facility] once he is transferred. Awaiting to give report to 6B RN and room to be cleaned.

## 2019-06-11 NOTE — H&P
[facility] History and Physical  Department of Internal Medicine    Patient Name: John Olvera MRN# [id number]   Age: 59 year old YOB: 1960     Date of Admission:6/10/2019    Primary care provider: Sarah Daly  Date of Service: 6/10/2019  Admitting Team: Night 2         Assessment and Plan:   John Olvera is a 59 year old male PMH of GERD   CKD,anxiety/depression, back surgery  C/b history of wound infections now s/p Revision Thoracic 10-Pelvis Posterior Instrumented Fusion With O-Arm.Stealth, Lumbar 4 Pedicle Subtraction Osteotomy, Removal Of Prior Instrumentation on 6/10 with Dr. Sebastian and Dr. Orellana transfered from [facility] recovery room for transient hypotension and lactic acidosis.    #. Transient hypotension   #. Lactic acidosis    post-op Hypotension and lactic acidosis.  Prolonged surgery with moderate blood loss likely culprit for hypotension lactic acidosis.  Blood pressure is responding to fluid management.   -Bolus for 1 L normal saline and continue maintenance fluid  -Check CBC and lactic acid  -Continue cefazolin 2 g every 8 hours post spinal surgery  Blood culture and UA/will broaden antibiotics if febrile  -If lactic acidosis and hypotension improved patient can transfer to [facility] in the morning    #. S/p day 0  Revision Thoracic 10-Pelvis Posterior Instrumented Fusion With O-Arm.Stealth, Lumbar 4 Pedicle Subtraction Osteotomy, Removal Of Prior Instrumentation on 6/10.   -Continue PCA hydromorphone started post op. Transition to orals in AM  -Ortho following  - monitor anterior chest armpit blisters likely from laying on his front for this surgery      Chronic condition   #. GERD: PTA omeprazole     #.CKD: Stable Cr. Avoid hypotension and nephrotoxins   #.anxiety/depression: PTA       CODE: Full   Diet/IVF: /ml, Regular diet   DVT ppx: per orto s/p spinal surgery   Disposition/Admission Status: Anticipate 1-2 days, may be here more than 2 midnights    Tima  MD Karis  Hospitalist/nicolette  [facility]    Departments of Medicine   Pager: [phone]             Chief Complaint:     Transferred from [facility]        HPI:   John Olvera is a 59 year old male PMH of GERD   CKD,anxiety/depression, back surgery  C/b history of wound infections now s/p Revision Thoracic 10-Pelvis Posterior Instrumented Fusion With O-Arm.Stealth, Lumbar 4 Pedicle Subtraction Osteotomy, Removal Of Prior Instrumentation on 6/10 with Dr. Sebastian and Dr. Orellana transfered from [facility] recovery room for transient hypotension and lactic acidosis.  Patient underwent 8-hour surgery today for spinal wound revision ostomy and removal of prior instrumentation.  Postop in the recovery room patient was hypotensive initial lactate of 3.4 so patient was transferred to the [facility].  Patient reports blisters under his armpit groin and chest from laying on his front for the surgery.  Does not endorse any fever no cough no chest pain no shortness of breath he reports some pain from his surgical site.  Was started on PCA hydromorphone postop.          Past Medical History:     Past Medical History:   Diagnosis Date     Anxiety      Chronic back pain      CKD (chronic kidney disease)      Class 3 severe obesity due to excess calories with serious comorbidity and body mass index (BMI) of 40.0 to 44.9 in adult (H) 10/12/2018     Depression      GERD (gastroesophageal reflux disease)      Lumbar radiculopathy 10/12/2018     Prediabetes               Past Surgical History:     Past Surgical History:   Procedure Laterality Date     BACK SURGERY  2015    laminectomy     BACK SURGERY      fixation kyphoplasty lumbar spine     BACK SURGERY  2001    spinal fusion     COLONOSCOPY       EPIDIDYMECTOMY  1998              Social History:     Social History     Socioeconomic History     Marital status:      Spouse name: Not on file     Number of children: Not on file      Years of education: Not on file     Highest education level: Not on file   Occupational History     Not on file   Social Needs     Financial resource strain: Not on file     Food insecurity:     Worry: Not on file     Inability: Not on file     Transportation needs:     Medical: Not on file     Non-medical: Not on file   Tobacco Use     Smoking status: Former Smoker     Packs/day: 1.00     Years: 40.00     Pack years: 40.00     Last attempt to quit: 2019     Years since quittin.3     Smokeless tobacco: Never Used     Tobacco comment: Quit 2019   Substance and Sexual Activity     Alcohol use: Yes     Binge frequency: Less than monthly     Drug use: No     Sexual activity: Not on file   Lifestyle     Physical activity:     Days per week: Not on file     Minutes per session: Not on file     Stress: Not on file   Relationships     Social connections:     Talks on phone: Not on file     Gets together: Not on file     Attends Hoahaoism service: Not on file     Active member of club or organization: Not on file     Attends meetings of clubs or organizations: Not on file     Relationship status: Not on file     Intimate partner violence:     Fear of current or ex partner: Not on file     Emotionally abused: Not on file     Physically abused: Not on file     Forced sexual activity: Not on file   Other Topics Concern     Not on file   Social History Narrative     Not on file            Family History:     Family History   Problem Relation Age of Onset     Pancreatic Cancer Father      Bladder Cancer Brother      Anesthesia Reaction Daughter         PONV     Deep Vein Thrombosis (DVT) No family hx of               Immunizations:     There is no immunization history on file for this patient.           Allergies:      Allergies   Allergen Reactions     Diagnostic X-Ray Materials Nausea and Vomiting     Patient vomits every time he has the contrast. Has tried medication for nausea and it did not work     Silver Nitrate  "Rash            Medications:       No current facility-administered medications on file prior to encounter.   Current Outpatient Medications on File Prior to Encounter:  BusPIRone HCl (BUSPAR PO) Take 15 mg by mouth daily (with dinner)   Escitalopram Oxalate (LEXAPRO PO) Take 20 mg by mouth daily   gabapentin (NEURONTIN) 800 MG tablet Take 800 mg by mouth 4 times daily   HYDROmorphone HCl (DILAUDID PO) Take 8 mg by mouth 4 times daily   ibuprofen (ADVIL/MOTRIN) 200 MG tablet Take 400 mg by mouth every 8 hours as needed for mild pain   OMEPRAZOLE PO Take 20 mg by mouth every morning   promethazine (PHENERGAN) 25 MG tablet Take 25 mg by mouth every morning   senna-docusate (SENOKOT-S;PERICOLACE) 8.6-50 MG per tablet Take 1 tablet by mouth 4 times daily as needed for constipation   tiZANidine (ZANAFLEX) 4 MG tablet Take 4 mg by mouth 2 times daily as needed    TRAZODONE HCL PO Take 150 mg by mouth At Bedtime   VITAMIN D, CHOLECALCIFEROL, PO Take 5,000 Units by mouth daily   Zolpidem Tartrate (AMBIEN CR PO) Take 12.5 mg by mouth At Bedtime            Review of Systems:     A complete ROS was performed and is negative other than what is stated in the HPI.          Physical Exam:   Blood pressure 112/55, pulse 85, temperature 97.4  F (36.3  C), temperature source Axillary, resp. rate 17, height 1.778 m (5' 10\"), weight 127.6 kg (281 lb 4.9 oz), SpO2 94 %.  General Appearance: Pleasant not in distress   HEENT: No jaundice, moist BM   Respiratory: CTAB  Cardiovascular: RRR, s1, s2 no m/g   GI: Soft, mild left lower quadrant tenderness   Genitourinary: No CVAT   Extremities : No Edema  SKIN: Small blisters on his anterior chest and armpits  Neurologic: A&Ox3, moves all extremities equally            Data:   Reviewed  in Epic         "

## 2019-06-11 NOTE — ANESTHESIA POSTPROCEDURE EVALUATION
Anesthesia POST Procedure Evaluation    Patient: John Olvera   MRN:     [id number] Gender:   male   Age:    59 year old :      1960        Preoperative Diagnosis: Lumbar Stenosis and Neurogenic Claudication,Flatbak Syndrome Of Lumbar Region   Procedure(s):  Revision Thoracic 10-Pelvis Posterior Instrumented Fusion With O-Arm.Stealth, Lumbar 4 Pedicle Subtraction Osteotomy, Removal Of Prior Instrumentation   Postop Comments: No value filed.       Anesthesia Type:  General  No value filed.    Reportable Event: NO     PAIN: Uncomplicated   Sign Out status: Comfortable, Well controlled pain     PONV: No PONV   Sign Out status:  No Nausea or Vomiting     Neuro/Psych: Uneventful perioperative course   Sign Out Status: Preoperative baseline; Age appropriate mentation     Airway/Resp.: Uneventful perioperative course   Sign Out Status: Non labored breathing, age appropriate RR; Resp. Status within EXPECTED Parameters     CV: Uneventful perioperative course   Sign Out status: Appropriate BP and perfusion indices; Appropriate HR/Rhythm     Disposition:   Sign Out in:  PACU  Disposition:  Floor  Recovery Course: Uneventful  Follow-Up: Not required     Comments/Narrative:  I was at the pt's bedside numerous times throughout his PACU stay.  Pt has no c/o of pain.  I have reviewed the pt's PO meds with him and he is content with the Dilaudid PCA.  Pt's lactic acid was high, now trending down.  Ketamine increased BP postop, DCed Ketamine.  I spoke with the hospitalist team; due to the lactic acid levels in the best interest of the pt he is to be moved to [facility].  I reviewed the plan with the pt and his family.  They are grateful for the care they have received and comfortable with the decision.  The moving arrangements are being made.  Dr. Walters has reviewed the pt with the [facility] team.  I have reviewed all of his labs and  his VSS.  I will be stepping out at this time.           Last Anesthesia Record  Vitals:  CRNA VITALS  6/10/2019 1507 - 6/10/2019 1607      6/10/2019             Pulse:  86    SpO2:  92 %    Resp Rate (observed):  4  (Abnormal)           Last PACU Vitals:  Vitals Value Taken Time   /81 6/10/2019  8:45 PM   Temp 36.3  C (97.4  F) 6/10/2019  4:30 PM   Pulse 90 6/10/2019  8:45 PM   Resp 10 6/10/2019  8:49 PM   SpO2 95 % 6/10/2019  8:49 PM   Temp src     NIBP     Pulse     SpO2     Resp     Temp     Ht Rate     Temp 2     Vitals shown include unvalidated device data.      Electronically Signed By: Aurora Beatty MD, Tere 10, 2019, 8:50 PM

## 2019-06-11 NOTE — OP NOTE
DATE OF SURGERY: 6/10/2019    PREOPERATIVE DIAGNOSIS: Lumbar Stenosis and Neurogenic Claudication,Flatbak Syndrome Of Lumbar Region             POSTOPERATIVE DIAGNOSIS: Same    PROCEDURES:  22 -Modifier: This was a revision procedure.  It required extensive exposure and removal of prior instrumentation which should increase the operative time, morbidity risk and length of the surgery as well as the blood loss.  1. L4 Pedicle Subtraction Osteotomy  2. Removal of Instrumentation T10-S1.  3. Exploration of fusion T10-S1, with obvious non-union T10-11, and L2-3  4. T10 through Pelvis Posterior Spinal Instrumentation, [employer] Solera..  5. Posterior Spinal Fusion T10-11, L3-5.  6. [place] and use of Local Autograft.  7. Use of O-Arm navigational guidance.  8. Use of Allograft    PRIMARY SURGEON: Salazar Sebastian MD    Co-Surgeon: Ubaldo Orellana, this was a revision multi-time operation, and the patient was at least 5 previous surgeries, who needed a complicated osteotomy through a scarred epidural canal and the second surgeon was necessary to minimize the operative time, blood loss and morbidity risks of the surgeon in this complex situation.    FIRST ASSISTANT: SANTIAGO Haines.  There was no qualified resident available, the assistant was necessary for retraction, visualization, and wound closure.      ANESTHESIA: General Endotracheal    COMPLICATIONS:  None.    SPECIMENS: None.    ESTIMATED BLOOD LOSS: 2245 mL    INDICATIONS:                          John Olvera is a 59 year old male who elected surgical treatment, and understood the indications for this surgery, as well as its risks, benefits, and alternatives as documented in the pre-operative H&P.  Specifically, we reviewed the risks and benefits of the surgery in detail. The risks include, but are not limited to, the general risks associated with anesthesia, including death, pulmonary embolism, DVT, stroke, myocardial infarction, pneumonia, and urinary  tract infection. Additional risks specific to the surgery include the risk of infection, failure to heal (non-union), dural tear with resultant CSF leak which might necessitate placement of a drain or revision surgery or could result in headaches, nerve injury resulting in weakness or paralysis, risk of adjacent segment disease, the risks of vascular injury, need for revision surgery in the future due to one of the above issues, or risk of incomplete symptom relief. John Olvera understands the risks of the surgery and wishes to proceed. No guarantees were given.    DESCRIPTION OF PROCEDURE:           John Olvera was taken to the operating  room, where the Anesthesiology Service induced satisfactory general anesthesia.  Ancef was given IV.  Venous thromboembolic prophylaxis was performed with sequential devices.  A Leon catheter was placed under standard sterile techniques.  The patient was placed prone on an open pro-axis frame with the abdomen hanging free and all bony prominences well padded.  The low back was then prepped and draped in its entirety in the usual sterile fashion. We then held a multidisciplinary time out in which we verified the patient, procedure, antibiotics, and operative plan.  All team members were in agreement.    We began by incising down through his previous scar.  We then performed a subperiosteal exposure out over the previous instrumentation from T10-S1.  This area was quite scarred.  It took quite a bit of work to get out lateral to the instrumentation.  Then we finally went about removing it, there were actually 3 different types of screws.  The first screws were globus, and then the others were made by Eladio, and the previous surgeons operative note did not include the fact that there was more than one kind of screw present, and this complicated the removal of the instrumentation because we had to wait for a time to try and get the right removal set for the Eladio instrumentation.   Nonetheless, we did continue working at it and we are able to get all the screws and rods out.  Dr. Alvarez worked on his side and I worked on my side and eventually we got out all the instrument Tatian.  Because of the increased time and blood loss were taking out all this old hardware we did add a 22 modifier to this procedure.    After removal of the hardware we then attached a reference frame to the T12 spinous process.  We then brought in the navigation and we performed instrumentation from T10 to the pelvis.  At each of the previous pedicle tracts we verified the position with navigation after having taken an arm spin.  We then under tapped by 1 mm for the planned screw lengths.  The screws were then placed under manual power.  We are able to do this at all of the levels except for the right side on L1 where the patient had a previous medial breach and we could not adequately redirected the screw.  We also chose not to place screws at L4 because this was the osteotomy level.    After placing the pedicle screws from T10-S1 we turned our attention to the pelvic instrumentation.  We used the navigated awl to cross the iliosacral joint starting at the S2 starting point and then tapped with a 6.5 mm tap followed by 9.5 millimeters tap and then screw.  We verified the position of all the screws with multiple alarm spends to verify that they were intraosseous.    Screw lengths were as follows:  T10: 8.5 x 55 bilaterally  T11: 7.5 x 50 in the left and 7.5 x 55 on the right  T12: 7.5 x 50 bilaterally  L1: 7.5 x 55 in the left  L2: 7.5 x 55 in the left and 6.5 x 55 on the right  L3: 6.5 x 55 dual headed on the left and 8.5 x 55 dual headed in the right  L5: 8.5 x 60 in the left and 8.5 x 50 dual headed on the right  S1: 8.5 x 50 dual headed on the left and 8.5 x 50 on the right  Pelvis: 9.5 x 100 bilaterally    We then thoroughly irrigated.  At this point we assess the fusion.  I used a [place] to pull on each of the  pedicle screws after having thoroughly cleaned the interlaminar space and there seemed to be a solid dorsal fusion mass from T11-L2 as well as from L3-S1.  However there was obvious nonunion and gross motion 2010 and 11 and L2 and L3 consistent with a nonunion at those segments.    We then went about our osteotomy.  I used a bur to cut through the dorsal fusion mass between L3 and L5.  I then used an osteotome to cut down through this and crack off the dorsal fusion mass and a single large piece.  Fortunately there had been no prior violation of the spinal canal and there is ligamentum flavum intact beneath this.  We then worked through the ligamentum flavum first my side and then my co-surgeon's side, and enter the spinal canal and then worked out through the foramen bilaterally using a combination of osteotomes and 4 mm Kerrisons.  We then took down the bilateral pedicles using a rondure.  At this point we could visualize both the exiting L3 and exiting L4 nerve roots bilaterally and we could see the wide decompression.  We then used an Carter to dissect lateral to the L4 vertebral body.  I then protected the dura on the patient's right side while my partner used an osteotome to make the osteotomy cut on his side and remove the osteotomy bone.  We then switched and I did the same on my side and we then used a downgoing pusher to break the dorsal cortex of L4.  The removal of this wedge of bone completed the 3 column osteotomy at L4.    We then used the hinged operative table and brought it into about 10 degrees of extension.  We measured and cut temporary rods.  We took an initial round of stitched images and we measured his lumbar lordosis as 47 degrees, which we felt was a little bit too high given that his pelvic incidence was 38 degrees preoperatively.  So we took some of the extension out of the bed, and then read the entire rods and did another round of stitched images and this time we measured at 39 degrees  which I felt was acceptable.  We then measured and cut all of the final rods and these were placed and then setscrews were finally tightened and broken off and we took our final stitched images again with good alignment.    We thoroughly irrigated.  I then went about thoroughly decorticated the remaining midline laminar bone from T10-S1.  We had about 90 cc of local autograft from the osteotomy site and this was packed into the interlaminar space to complete the revision fusion from L3-L5 as well as the L2-3 fusion for nonunion and the T10-11 fusion for nonunion.    We then placed 2 g of vancomycin powder into the wound.  A 15 Romansh STAN drain was placed deep to the fascia.  0 Vicryl pops were used followed by a #1 Vicryl runner and then an 8 inch Hemovac above the fascia followed by more interrupted Vicryls and then 2-0 Monocryl for the dermis and 3 oh nylons for the skin.    We took multiple motor or sensory monitoring signals throughout the case and these were completely stable with no change in the monitoring.    I was present and scrubbed for the entire procedure.  After wound closure the patient was turned supine and extubated and taken to the recovery area in a stable situation.    Salazar Sebastian MD

## 2019-06-11 NOTE — PROGRESS NOTES
"Orthopaedic Surgery  Progress Note     Subjective: No acute events overnight. Pain well controlled. Blistering on the anterior shoulders bilaterally.    Denies fever or chills, CP, SOB, numbness or tingling, motor dysfunction or weakness.     Objective:  /63 (BP Location: Left arm)   Pulse 85   Temp 99.1  F (37.3  C) (Oral)   Resp 16   Ht 1.778 m (5' 10\")   Wt 127.6 kg (281 lb 4.9 oz)   SpO2 98%   BMI 40.36 kg/m    General: NAD, alert and oriented, cooperative with exam.  Cardio: RRR, extremities wwp.   Respiratory: Non-labored breathing.  MSK: Dressing c/d/i. DP and PT 2+. Blisters on the anterior shoulders.     Sensation: intact to light touch L3-S1 distribution BLE    Lower Extremity Strength Left Right   L2-3: Hip flexion 5/5 5/5   L4:  Knee extension  5/5 5/5   L5:  Foot / EHL dorsiflexion 5/5 5/5   S1:  Plantarflexion  5/5 5/5       Labs:  Recent Labs   Lab 06/11/19  0455 06/11/19  0035 06/10/19  1650 06/10/19  1344 06/10/19  1129 06/10/19  0848   HGB 9.7* 9.7* 11.3* 13.4 11.5* 11.2*   WBC 9.3 9.8  --   --   --   --        I/O last 3 completed shifts:  In: 6593 [P.O.:240; I.V.:4847; Other:1006]  Out: 4355 [Urine:1760; Drains:350; Blood:2245]    All cultures:  Recent Labs   Lab 06/11/19  0036 06/11/19  0035   CULT No growth after 1 hour No growth after 1 hour       Assessment:  John Olvera is a 59 year old male with PMH including chronic pain, depression, pre-diabetes, history of wound infections now s/p Revision Thoracic 10-Pelvis Posterior Instrumented Fusion With O-Arm.Stealth, Lumbar 4 Pedicle Subtraction Osteotomy, Removal Of Prior Instrumentation on 6/10 with Dr. Sebastian and Dr. Orellana.      Orthopaedics Primary; Hoapitalist comanagement  Activity: Up with assist until independent. No excessive bending or twisting. No lifting >10 lbs x 6 weeks. No Marine lift for transfers  Weight bearing status: WBAT  Pain management: No NSAIDs   Antibiotics: Ancef until drains removed  Diet: Begin with " clear fluids and progress diet as tolerated  DVT prophylaxis: SCDs only. No chemical DVT ppx needed.  Imaging: XR Upright throraco-lumbar PTDC - ordered.  Labs: Hgb POD#1.  Bracing/Splinting: None.  Dressings: Keep Aquacel c/d/i x 7 days.  Drains: Hemovac and STAN drains. Document output per shift.  Leon catheter: Remove POD#1.  Physical Therapy/Occupational Therapy: Eval and treat.  Cultures: Pending, follow culture results closely.  Aerobic and anaerobic  Consults: Hospitalist, Pain management    Follow-up: Clinic with Dr. Sebastian in 6 weeks with repeat x-rays    Disposition: Pending progress with therapies, pain control on orals, and medical stability, anticipate discharge to home Thursday vs Friday    Henrry Holguin MD  Orthopaedic Resident, PGY-4  Pager: [phone]

## 2019-06-11 NOTE — PROGRESS NOTES
[facility], [facility] Progress Note - Hospitalist Service, Gold 7       Date of Admission:  6/10/2019  Assessment & Plan      John Olvera is a 59 year old male PMH significant for chronic back pain, multiple back surgeries c/b wound infections, CKD stage II, GERD, anxiety, and depression who is POD #1 s/p revision Thoracic 10-Pelvis Posterior Instrumented Fusion With O-Arm.Stealth, Lumbar 4 Pedicle Subtraction Osteotomy, Removal Of Prior Instrumentation on 6/10 with Dr. Sebastian and Dr. Orellana.  He was transferred to [facility] from [facility] for transient hypotension and lactic acidosis.    # Hypotension - Resolved.  BPs 120/58 this AM.  Likely 2/2 acute blood loss intraop (EBL ~ 2L) but responded well to fluids.  Mentating well and aysmptomatic. Hgb stable.        # Lactic acidosis - Resolved.  LA elevated to 4.4 last evening postop, now downtrending and 1.9 this afternoon.  Initial elevation felt to be 2/2 prolonged surgery with acute blood loss and hypotension.  Improved following fluid resuscitation.  Continued on Cefazolin, last dose at 0830 this AM.         # Pain control - Seen by Pain Service today 6/11.  Per pt report, using about 32mg of PO Dilaudid PTA, but use of PCA reflecting closer to 24mg.  Per Pain Services, plan as below:   - APAP 975mg Q8H given mild AST elevation and potential to mask fever response   - Dilaudid PO 4mg Q3H PRN, low threshold to increase to 4-6mg Q3H PRN if pain uncontrolled   - Can use IV Dilaudid as back up, but try PO first  - Stopped Tizanidine   - Start Robaxin 500mg Q6H PRN   - Menthol patches   - Continue Gabapentin 800mg QID  - Discourage NSAID use given hx CKD     # GERD - Continued on PTA Omeprazole     # Mild LAZARO in setting CKD stage II - Cr 1.27, BUN 14.  Appears to be close to baseline, which fluctuates from 1.2-1.4.  Continued on IVFs overnight.   - Recommend repeat BMP in AM to trend renal function   - Avoid/minimize nephrotoxic  meds, hypotension      # Anxiety, depression - Continue PTA Buspar, Lexapro, and Trazodone.  Consider stopping Ambien while inpatient.          Diet: Advance Diet as Tolerated: Regular Diet Adult  Diet    DVT Prophylaxis: Per Ortho - not on chemical ppx at time of transfer to   Leon Catheter: not present  Code Status: Full Code      Disposition Plan   Transfer back to [facility] to Orthopedics service under care of Dr. Salazar Sebastian.  Entered: SHIVANI Alberts CNP 06/11/2019, 1:17 PM       Plan discussed with Attending MD Dr. Sal Graff.      SHIVANI Alberts CNP  Hospitalist Service, [address]  Pager: [phone]  Please see sticky note for cross cover information    ______________________________________________________________________    Interval History   John is resting in bed.  Feels okay, denies, dizziness, dyspnea, chest pain, and abdominal pain.  No nausea or vomiting.  Pain controlled on PCA, but reports some musculoskeletal pain due to prolonged prone positioning.     Data reviewed today: I reviewed all medications, new labs and imaging results over the last 24 hours.     Physical Exam   Vital Signs: Temp: 98.1  F (36.7  C) Temp src: Oral BP: 122/50 Pulse: 85 Heart Rate: 83 Resp: 16 SpO2: 98 % O2 Device: Nasal cannula Oxygen Delivery: 2 LPM  Weight: 281 lbs 4.91 oz    GENERAL: Alert and oriented x 3. Well nourished, well developed.  Large body habitus.  No acute distress.    HEENT: Normocephalic, atraumatic. Anicteric sclera. Mucous membranes moist.   CV: RRR. S1, S2. No murmurs appreciated.   RESPIRATORY: Effort normal on room air. Lungs CTAB with no wheezing, rales, or rhonchi.   GI: Abdomen soft and non distended, bowel sounds present x all 4 quadrants. No tenderness, rebound, or guarding.   NEUROLOGICAL: No focal deficits. Follows commands.  Strength equal in upper and lower extremities. Sensation globally intact.     MUSCULOSKELETAL: No joint swelling or tenderness. Moves all extremities.   EXTREMITIES: No gross deformities. No peripheral edema.   SKIN: Grossly warm, dry, and intact. No jaundice. No rashes.         Data   Recent Labs   Lab 06/11/19  0455 06/11/19  0035 06/10/19  1955 06/10/19  1650 06/10/19  1344  06/10/19  0848   WBC 9.3 9.8  --   --   --   --   --    HGB 9.7* 9.7*  --  11.3* 13.4   < > 11.2*   MCV 94 94  --   --   --   --   --    * 103*  --   --   --   --   --    INR  --   --   --   --  1.16*  --   --      --  141  --  140   < > 140  139   POTASSIUM 4.3  --  4.7  --  3.3*   < > 3.8  3.5   CHLORIDE 106  --  108  --   --   --  110*   CO2 30  --  24  --   --   --  22   BUN 14  --  15  --   --   --   --    CR 1.27*  --  1.23  --   --   --   --    ANIONGAP 2*  --  9  --   --   --  8   DEEPA 7.7*  --  7.6*  --   --   --   --    *  --  172*  --  166*   < > 128*   ALBUMIN  --   --  2.9*  --   --   --   --    PROTTOTAL  --   --  5.2*  --   --   --   --    BILITOTAL  --   --  0.6  --   --   --   --    ALKPHOS  --   --  39*  --   --   --   --    ALT  --   --  42  --   --   --   --    AST  --   --  71*  --   --   --   --     < > = values in this interval not displayed.     No results found for this or any previous visit (from the past 24 hour(s)).  Medications     dextrose 5% and 0.45% NaCl + KCl 20 mEq/L       sodium chloride 125 mL/hr at 06/11/19 1056       acetaminophen  975 mg Oral Q8H DAVID     busPIRone  15 mg Oral Daily with supper     ceFAZolin  2 g Intravenous Q8H     docusate sodium  100 mg Oral BID     escitalopram  20 mg Oral Daily     ranitidine  150 mg Oral Q12H    Or     famotidine  20 mg Intravenous Q12H     gabapentin  800 mg Oral 4x Daily     menthol   Transdermal Q8H     omeprazole  20 mg Oral QAM     polyethylene glycol  17 g Oral Daily     promethazine  25 mg Oral QAM     senna-docusate  2 tablet Oral BID     sodium chloride (PF)  3 mL Intracatheter Q8H     traZODone  150 mg Oral  At Bedtime     zolpidem ER  12.5 mg Oral At Bedtime

## 2019-06-11 NOTE — PLAN OF CARE
Shift: 2915-2164  VS: Temp: 98.1  F (36.7  C) Temp src: Oral BP: 122/50 Pulse: 85 Heart Rate: 83 Resp: 16 SpO2: 98 % O2 Device: Nasal cannula Oxygen Delivery: 2 LPM  Pain: Pt c/o of pain at surgery site and in armpits from positioning in surgery  Neuro: A&O, neuros intact  Cardiac: wnl  Respiratory: LS clear, no SOB  GI/Diet/Appetite: regular diet, no appetite, no BM  : wnl  LDA's: 1 SATN to bulb suction, hemovac  Skin: Back incision dressing c/d/i  Activity: Assist of 1 + walker  Pertinent Labs/Lab Collection: Lactic acid 1.9     Plan: Pt to transfer to [address] [place] [address] this evening.

## 2019-06-11 NOTE — PLAN OF CARE
OT 6B: Pt. with PT in am, asleep upon pm attempt. Per family,chart and RN plan is for pt. to transfer back to [place] today. Will reschedule per POC.

## 2019-06-11 NOTE — PROGRESS NOTES
06/11/19 1000   Quick Adds   Type of Visit Initial PT Evaluation   Living Environment   Lives With alone   Living Arrangements mobile home   Home Accessibility stairs to enter home   Number of Stairs, Main Entrance 2   Stair Railings, Main Entrance none   Transportation Anticipated car, drives self;family or friend will provide   Living Environment Comment Pt lives alone but has dtr and son in law that live close by that can provide assist. Not his first major back surgery.   Self-Care   Usual Activity Tolerance good   Current Activity Tolerance fair   Regular Exercise No   Activity/Exercise/Self-Care Comment Pt on disability. Enjoys chasing after grandchildren.   Functional Level Prior   Ambulation 0-->independent   Transferring 0-->independent   Fall history within last six months no   Which of the above functional risks had a recent onset or change? ambulation;transferring   Prior Functional Level Comment Uses cane/FWW for community ambulation. No AD in household.   General Information   Onset of Illness/Injury or Date of Surgery - Date 06/10/19   Patient/Family Goals Statement Decrease pain   Pertinent History of Current Problem (include personal factors and/or comorbidities that impact the POC) John Olvera is a 59 year old male PMH significant for chronic back pain, multiple back surgeries c/b wound infections, CKD stage II, GERD, anxiety, and depression who is POD #1 s/p revision Thoracic 10-Pelvis Posterior Instrumented Fusion With O-Arm.Stealth, Lumbar 4 Pedicle Subtraction Osteotomy, Removal Of Prior Instrumentation on 6/10 with Dr. Sebastian and Dr. Orellana.  He was transferred to [facility] from [facility] for transient hypotension and lactic acidosis.   Precautions/Limitations fall precautions;spinal precautions   General Observations Pt sidelying left in bed; agreeabel to session.   Cognitive Status Examination   Orientation orientation to person, place and time   Level of Consciousness alert   Follows  Commands and Answers Questions 100% of the time   Personal Safety and Judgment intact   Memory intact   Pain Assessment   Patient Currently in Pain Yes, see Vital Sign flowsheet   Integumentary/Edema   Integumentary/Edema no deficits were identifed   Posture    Posture Forward head position;Protracted shoulders;Kyphosis   Range of Motion (ROM)   ROM Comment B LE AROM mildly limited due to pain grossly   Strength   Strength Comments B LE strength at least 3+/5 as per functional mobility but weakness noted   Bed Mobility   Bed Mobility Comments Supine>sit: mod A for trunk support and cues   Transfer Skills   Transfer Comments Sit>stand: CGA/FWW   Gait   Gait Comments Amb 10' around bed with FWW/CGA; amb slowly with mild forward flexed posture over FWW and very slow george   Balance   Balance Comments CGA for standing static/dynamic balance with FWW; indep sitting balance   Sensory Examination   Sensory Perception no deficits were identified   General Therapy Interventions   Planned Therapy Interventions balance training;bed mobility training;gait training;neuromuscular re-education;strengthening;stretching;ROM;transfer training;home program guidelines;progressive activity/exercise   Clinical Impression   Criteria for Skilled Therapeutic Intervention yes, treatment indicated   PT Diagnosis Impaired functional mobility   Influenced by the following impairments Pain, weakness, fair posture   Functional limitations due to impairments Bed mobility, transfers, gait, balance, endurance   Clinical Presentation Stable/Uncomplicated   Clinical Presentation Rationale Clinical judgement   Clinical Decision Making (Complexity) Low complexity   Therapy Frequency` daily   Predicted Duration of Therapy Intervention (days/wks) 1 week   Anticipated Discharge Disposition Home with Assist;Home with Home Therapy   Risk & Benefits of therapy have been explained Yes   Patient, Family & other staff in agreement with plan of care Yes  "  [facility] AM-PAC  \"6 Clicks\" V.2 Basic Mobility Inpatient Short Form   1. Turning from your back to your side while in a flat bed without using bedrails? 2 - A Lot   2. Moving from lying on your back to sitting on the side of a flat bed without using bedrails? 2 - A Lot   3. Moving to and from a bed to a chair (including a wheelchair)? 3 - A Little   4. Standing up from a chair using your arms (e.g., wheelchair, or bedside chair)? 3 - A Little   5. To walk in hospital room? 3 - A Little   6. Climbing 3-5 steps with a railing? 3 - A Little   Basic Mobility Raw Score (Score out of 24.Lower scores equate to lower levels of function) 16   Total Evaluation Time   Total Evaluation Time (Minutes) 10     "

## 2019-06-12 ENCOUNTER — APPOINTMENT (OUTPATIENT)
Dept: PHYSICAL THERAPY | Facility: CLINIC | Age: 59
DRG: 457 | End: 2019-06-12
Attending: ORTHOPAEDIC SURGERY
Payer: OTHER MISCELLANEOUS

## 2019-06-12 ENCOUNTER — TELEPHONE (OUTPATIENT)
Facility: CLINIC | Age: 59
End: 2019-06-12

## 2019-06-12 ENCOUNTER — APPOINTMENT (OUTPATIENT)
Dept: OCCUPATIONAL THERAPY | Facility: CLINIC | Age: 59
DRG: 457 | End: 2019-06-12
Attending: PHYSICIAN ASSISTANT
Payer: OTHER MISCELLANEOUS

## 2019-06-12 LAB
BLD PROD TYP BPU: NORMAL
BLD PROD TYP BPU: NORMAL
BLD UNIT ID BPU: 0
BLD UNIT ID BPU: 0
BLOOD PRODUCT CODE: NORMAL
BLOOD PRODUCT CODE: NORMAL
BPU ID: NORMAL
BPU ID: NORMAL
TRANSFUSION STATUS PATIENT QL: NORMAL

## 2019-06-12 PROCEDURE — 25000132 ZZH RX MED GY IP 250 OP 250 PS 637: Performed by: PHYSICIAN ASSISTANT

## 2019-06-12 PROCEDURE — 97116 GAIT TRAINING THERAPY: CPT | Mod: GP | Performed by: PHYSICAL THERAPIST

## 2019-06-12 PROCEDURE — 99207 ZZC NO CHARGE FIRST FOLLOW UP PS: CPT

## 2019-06-12 PROCEDURE — 97165 OT EVAL LOW COMPLEX 30 MIN: CPT | Mod: GO | Performed by: OCCUPATIONAL THERAPIST

## 2019-06-12 PROCEDURE — 97530 THERAPEUTIC ACTIVITIES: CPT | Mod: GP | Performed by: PHYSICAL THERAPIST

## 2019-06-12 PROCEDURE — 25000132 ZZH RX MED GY IP 250 OP 250 PS 637: Performed by: NURSE PRACTITIONER

## 2019-06-12 PROCEDURE — 99232 SBSQ HOSP IP/OBS MODERATE 35: CPT | Performed by: INTERNAL MEDICINE

## 2019-06-12 PROCEDURE — 25000128 H RX IP 250 OP 636: Performed by: PHYSICIAN ASSISTANT

## 2019-06-12 PROCEDURE — 99207 ZZC CDG-MDM COMPONENT: MEETS LOW - DOWN CODED: CPT | Performed by: INTERNAL MEDICINE

## 2019-06-12 PROCEDURE — 25800030 ZZH RX IP 258 OP 636: Performed by: INTERNAL MEDICINE

## 2019-06-12 PROCEDURE — 97535 SELF CARE MNGMENT TRAINING: CPT | Mod: GO | Performed by: OCCUPATIONAL THERAPIST

## 2019-06-12 PROCEDURE — 25000132 ZZH RX MED GY IP 250 OP 250 PS 637: Performed by: INTERNAL MEDICINE

## 2019-06-12 PROCEDURE — 12000001 ZZH R&B MED SURG/OB UMMC

## 2019-06-12 PROCEDURE — 25000132 ZZH RX MED GY IP 250 OP 250 PS 637: Performed by: STUDENT IN AN ORGANIZED HEALTH CARE EDUCATION/TRAINING PROGRAM

## 2019-06-12 RX ORDER — ONDANSETRON 4 MG/1
4 TABLET, ORALLY DISINTEGRATING ORAL EVERY 6 HOURS PRN
Qty: 10 TABLET | Refills: 0 | Status: SHIPPED | OUTPATIENT
Start: 2019-06-12 | End: 2019-06-17

## 2019-06-12 RX ORDER — HYDROMORPHONE HCL/0.9% NACL/PF 0.2MG/0.2
.2-.4 SYRINGE (ML) INTRAVENOUS
Status: DISCONTINUED | OUTPATIENT
Start: 2019-06-12 | End: 2019-06-18 | Stop reason: HOSPADM

## 2019-06-12 RX ORDER — HYDROMORPHONE HYDROCHLORIDE 4 MG/1
4-6 TABLET ORAL
Qty: 50 TABLET | Refills: 0 | Status: SHIPPED | OUTPATIENT
Start: 2019-06-12 | End: 2019-12-13

## 2019-06-12 RX ORDER — LANOLIN ALCOHOL/MO/W.PET/CERES
3 CREAM (GRAM) TOPICAL
Status: DISCONTINUED | OUTPATIENT
Start: 2019-06-12 | End: 2019-06-18 | Stop reason: HOSPADM

## 2019-06-12 RX ORDER — CALCIUM CARBONATE 500 MG/1
500 TABLET, CHEWABLE ORAL DAILY PRN
Status: DISCONTINUED | OUTPATIENT
Start: 2019-06-12 | End: 2019-06-18 | Stop reason: HOSPADM

## 2019-06-12 RX ORDER — HYDROXYZINE HYDROCHLORIDE 50 MG/1
50 TABLET, FILM COATED ORAL EVERY 6 HOURS PRN
Status: DISCONTINUED | OUTPATIENT
Start: 2019-06-12 | End: 2019-06-18 | Stop reason: HOSPADM

## 2019-06-12 RX ORDER — METHOCARBAMOL 500 MG/1
500 TABLET, FILM COATED ORAL EVERY 6 HOURS PRN
Qty: 20 TABLET | Refills: 0 | Status: SHIPPED | OUTPATIENT
Start: 2019-06-12 | End: 2019-12-13

## 2019-06-12 RX ADMIN — HYDROMORPHONE HYDROCHLORIDE 6 MG: 2 TABLET ORAL at 22:34

## 2019-06-12 RX ADMIN — HYDROMORPHONE HYDROCHLORIDE 6 MG: 2 TABLET ORAL at 05:52

## 2019-06-12 RX ADMIN — METHOCARBAMOL 500 MG: 500 TABLET, FILM COATED ORAL at 14:27

## 2019-06-12 RX ADMIN — CEFAZOLIN SODIUM 2 G: 2 INJECTION, SOLUTION INTRAVENOUS at 05:18

## 2019-06-12 RX ADMIN — ACETAMINOPHEN 975 MG: 325 TABLET, FILM COATED ORAL at 09:04

## 2019-06-12 RX ADMIN — SENNOSIDES AND DOCUSATE SODIUM 2 TABLET: 8.6; 5 TABLET ORAL at 19:55

## 2019-06-12 RX ADMIN — GABAPENTIN 800 MG: 800 TABLET, FILM COATED ORAL at 09:04

## 2019-06-12 RX ADMIN — HYDROMORPHONE HYDROCHLORIDE 6 MG: 2 TABLET ORAL at 16:04

## 2019-06-12 RX ADMIN — ZOLPIDEM TARTRATE 12.5 MG: 6.25 TABLET, EXTENDED RELEASE ORAL at 21:20

## 2019-06-12 RX ADMIN — RANITIDINE 150 MG: 150 TABLET ORAL at 11:59

## 2019-06-12 RX ADMIN — MENTHOL 1 PATCH: 205.5 PATCH TOPICAL at 09:40

## 2019-06-12 RX ADMIN — CEFAZOLIN SODIUM 2 G: 2 INJECTION, SOLUTION INTRAVENOUS at 22:34

## 2019-06-12 RX ADMIN — PROMETHAZINE HYDROCHLORIDE 25 MG: 25 TABLET ORAL at 09:04

## 2019-06-12 RX ADMIN — ACETAMINOPHEN 975 MG: 325 TABLET, FILM COATED ORAL at 14:27

## 2019-06-12 RX ADMIN — BUSPIRONE HYDROCHLORIDE 15 MG: 7.5 TABLET ORAL at 16:04

## 2019-06-12 RX ADMIN — CEFAZOLIN SODIUM 2 G: 2 INJECTION, SOLUTION INTRAVENOUS at 14:28

## 2019-06-12 RX ADMIN — SODIUM CHLORIDE: 9 INJECTION, SOLUTION INTRAVENOUS at 12:06

## 2019-06-12 RX ADMIN — DOCUSATE SODIUM 100 MG: 100 CAPSULE, LIQUID FILLED ORAL at 09:04

## 2019-06-12 RX ADMIN — SENNOSIDES AND DOCUSATE SODIUM 2 TABLET: 8.6; 5 TABLET ORAL at 09:04

## 2019-06-12 RX ADMIN — ESCITALOPRAM OXALATE 20 MG: 20 TABLET ORAL at 09:04

## 2019-06-12 RX ADMIN — ACETAMINOPHEN 975 MG: 325 TABLET, FILM COATED ORAL at 21:20

## 2019-06-12 RX ADMIN — OMEPRAZOLE 20 MG: 20 CAPSULE, DELAYED RELEASE ORAL at 09:04

## 2019-06-12 RX ADMIN — ACETAMINOPHEN 975 MG: 325 TABLET, FILM COATED ORAL at 02:37

## 2019-06-12 RX ADMIN — GABAPENTIN 800 MG: 800 TABLET, FILM COATED ORAL at 11:59

## 2019-06-12 RX ADMIN — HYDROMORPHONE HYDROCHLORIDE 6 MG: 2 TABLET ORAL at 00:45

## 2019-06-12 RX ADMIN — GABAPENTIN 800 MG: 800 TABLET, FILM COATED ORAL at 16:04

## 2019-06-12 RX ADMIN — GABAPENTIN 800 MG: 800 TABLET, FILM COATED ORAL at 19:55

## 2019-06-12 RX ADMIN — HYDROMORPHONE HYDROCHLORIDE 6 MG: 2 TABLET ORAL at 18:55

## 2019-06-12 RX ADMIN — HYDROMORPHONE HYDROCHLORIDE 6 MG: 2 TABLET ORAL at 12:05

## 2019-06-12 RX ADMIN — TRAZODONE HYDROCHLORIDE 150 MG: 100 TABLET ORAL at 21:20

## 2019-06-12 RX ADMIN — HYDROMORPHONE HYDROCHLORIDE 6 MG: 2 TABLET ORAL at 09:10

## 2019-06-12 ASSESSMENT — ACTIVITIES OF DAILY LIVING (ADL)
ADLS_ACUITY_SCORE: 13
ADLS_ACUITY_SCORE: 11
ADLS_ACUITY_SCORE: 10

## 2019-06-12 NOTE — TELEPHONE ENCOUNTER
PA Initiation    Medication: Ondansetron 4mg ODT  Insurance Company: Medicare Blue RX - Phone [phone] Fax [phone]  Pharmacy Filling the Rx: [facility] - [place], [place] - [address]  Filling Pharmacy Phone: [phone]  Filling Pharmacy Fax: [phone]  Start Date: 6/12/2019  CM Key: [id number] - PA Case ID: [id number]    [facility]  [address]  [address], [place] [address]   [email or web address]  [email or web address]   Phone: [phone]  Pager: [phone]  Fax: [phone]

## 2019-06-12 NOTE — PLAN OF CARE
Pt transferred to the unit from the [facility] around 6pm. Pt arrived via stretcher accompanied by paramedics and with personal belongings. Pt able to ambulate with walker to bed.     VS:   Temp: 99.5  F (37.5  C) Temp src: Oral BP: 127/55   Heart Rate: 95 Resp: 16 SpO2: 96 % O2 Device: None (Room air)   Vitals stable  Lung sounds are clear   Output:   Voiding spontaneously, using urinal at bedside. Passing flatus - no BM this evening.    Activity:   Ambulated to bed with walker and assist of 1.    Skin: Incision - otherwise intact.    Pain:   Pt reports moderate back pain not well controled with 4mg dilaudid Q3hrs - ortho resident notified - order increased to 4-6mg.    Neuro/CMS:   Some numbness and tingling in left hand - otherwise intact. Alert and oriented X4.    Dressing(s):   Aquacel intact with marked drainage - no change.    Diet:   Regular diet. Reports no appetite.    LDA:   Left PIV infusing normal saline at 125/hr.   Right PIV saline locked.    Equipment:   IV pole.    Plan:   Discharge plan to be determined.    Additional Info:   Able to make needs known. Will continue with plan of care.

## 2019-06-12 NOTE — PROGRESS NOTES
06/12/19 1016   Quick Adds   Type of Visit Initial Occupational Therapy Evaluation   Living Environment   Lives With alone   Living Arrangements mobile home   Number of Stairs, Main Entrance 2   Transportation Anticipated car, drives self;family or friend will provide   Living Environment Comment Tub, reacher, sock aid, LH shoe horn, handheld shower   Self-Care   Usual Activity Tolerance good   Current Activity Tolerance fair   Regular Exercise No   Equipment Currently Used at Home none   Activity/Exercise/Self-Care Comment Patient independent in self-cares/mobility without AE.  Walking around the block is the furthest patient could walk.   Functional Level   Ambulation 0-->independent   Transferring 0-->independent   Toileting 0-->independent   Bathing 0-->independent   Dressing 0-->independent   Eating 0-->independent   Communication 0-->understands/communicates without difficulty   Swallowing 0-->swallows foods/liquids without difficulty   Cognition 0 - no cognition issues reported   Fall history within last six months no   Prior Functional Level Comment Patient independent in self-cares/mobility without AE.  Walking around the block is the furthest patient could walk.   General Information   Onset of Illness/Injury or Date of Surgery - Date 06/10/19   Referring Physician Dr. Orellana/Dr. Sebastian   Patient/Family Goals Statement return home to baseline   Additional Occupational Profile Info/Pertinent History of Current Problem John Olvera is a 59 year old male with PMH including chronic pain, depression, pre-diabetes, history of wound infections now s/p Revision Thoracic 10-Pelvis Posterior Instrumented Fusion With O-Arm.Stealth, Lumbar 4 Pedicle Subtraction Osteotomy, Removal Of Prior Instrumentation on 6/10 with Dr. Sebastian and Dr. Orellana.    Precautions/Limitations spinal precautions   General Observations On RA, alert   Cognitive Status Examination   Cognitive Comment No cognitive concerns   Sensory  Examination   Sensory Comments No N/T noted   Pain Assessment   Patient Currently in Pain Yes, see Vital Sign flowsheet   Range of Motion (ROM)   ROM Comment B UE AROM WFL   Mobility   Bed Mobility Bed mobility skill: Supine to sit   Bed Mobility Skill: Supine to Sit   Level of [place]: Supine/Sit stand-by assist   Assistive Device: Supine/Sit bedrail   Transfer Skill: Bed to Chair/Chair to Bed   Level of [place]: Bed to Chair contact guard   Assistive Device - Transfer Skill Bed to Chair Chair to Bed Rehab Eval standard walker   Transfer Skill: Sit to Stand   Level of [place]: Sit/Stand contact guard   Assistive Device for Transfer: Sit/Stand standard walker   Transfer Skill: Toilet Transfer   Level of [place]: Toilet contact guard   Assistive Device grab bars   Upper Body Dressing   Level of [place]: Dress Upper Body independent   Lower Body Dressing   Level of [place]: Dress Lower Body other (see comments)  (not assessed)   Toileting   Level of [place]: Toilet stand-by assist   Grooming   Level of [place]: Grooming independent   Eating/Self Feeding   Level of [place]: Eating independent   Activities of Daily Living Analysis   Impairments Contributing to Impaired Activities of Daily Living pain;post surgical precautions;ROM decreased   General Therapy Interventions   Planned Therapy Interventions ADL retraining   Clinical Impression   Criteria for Skilled Therapeutic Interventions Met yes, treatment indicated   OT Diagnosis impaired self-cares/mobility   Influenced by the following impairments pain; decreased ROM   Assessment of Occupational Performance 1-3 Performance Deficits   Identified Performance Deficits dressing; bathing; toileting   Clinical Decision Making (Complexity) Low complexity   Therapy Frequency daily   Predicted Duration of Therapy Intervention (days/wks) 1-2 days   Anticipated Discharge Disposition Home with Assist   Risks and Benefits of Treatment  "have been explained. Yes   Patient, Family & other staff in agreement with plan of care Yes   [facility] AM-[facility] TM \"6 Clicks\"   2016, Trustees of [facility], under license to [employer].  All rights reserved.   6 Clicks Short Forms Daily Activity Inpatient Short Form   [facility] AM-PAC  \"6 Clicks\" Daily Activity Inpatient Short Form   1. Putting on and taking off regular lower body clothing? 3 - A Little   2. Bathing (including washing, rinsing, drying)? 3 - A Little   3. Toileting, which includes using toilet, bedpan or urinal? 3 - A Little   4. Putting on and taking off regular upper body clothing? 4 - None   5. Taking care of personal grooming such as brushing teeth? 4 - None   6. Eating meals? 4 - None   Daily Activity Raw Score (Score out of 24.Lower scores equate to lower levels of function) 21   Total Evaluation Time   Total Evaluation Time (Minutes) 8     "

## 2019-06-12 NOTE — TELEPHONE ENCOUNTER
Prior Authorization Approval    Authorization Effective Date: 3/14/2019  Authorization Expiration Date: 6/11/2020  Medication: Ondansetron 4mg ODT  Approved Dose/Quantity: 4 tablets per day  Reference #: CMM Key: [id number] - [place] Case ID: [id number]   Insurance Company: Medicare Blue RX - Phone [phone] Fax [phone]  Expected CoPay: $3.66     CoPay Card Available: No    Foundation Assistance Needed: n/a  Which Pharmacy is filling the prescription (Not needed for infusion/clinic administered): [facility] [place], [place] - [address]  Pharmacy Notified: Yes  Patient Notified: No    Mariaa Mckeon  [place] Discharge Pharmacy Liaison     [facility]  [address], [place] [address]   [email or web address]  [email or web address]   Phone: [phone]  Pager: [phone]  Fax: [phone]

## 2019-06-12 NOTE — PLAN OF CARE
Pt is on pathway. OOB to BR/ ambulating in room with minimal to SBA. Needs encouragement for increase oral intake. IV fluids at 75 ml/ hr. Good void, BM today. Neuros intact, denies numbness and tingling. Incision C/D/I. STAN bulb patent. Hemovac with questionable air leak- bag changed. Lungs CTA, BS +. Good pain control with schedule and PRN analgesics. Alert and oriented, VSS. Will continue to monitor incision, drains, pain and appetite.

## 2019-06-12 NOTE — PROGRESS NOTES
"VS:    Blood pressure 127/55, pulse 85, temperature 99.5  F (37.5  C), temperature source Oral, resp. rate 16, height 1.778 m (5' 10\"), weight 127.6 kg (281 lb 4.9 oz), SpO2 96 %.     Output:    Voiding spontaneously, using urinal at bedside. Passing flatus - no BM this shift   Activity:    Assist of one with walker   Skin: Back Incision, redness to bilat armpit   Pain:    Back pain fairly well controlled with PRN dilaudid at rest.    Neuro/CMS:    Denies numbness and tingling.   Dressing(s):    Aquacel intact to back incision with marked drainage - no change.    Diet:    Regular diet.    LDA:    Right PIV with IV infusing. STAN patent and draining. Hemovac not patent    Equipment:    IV pole.    Plan:    Discharge plan to be determined.    Additional Info:    Able to make needs known. Will continue with plan of care         "

## 2019-06-12 NOTE — PROGRESS NOTES
Patient: John Olvera  Date of Service: June 12, 2019 Admission Date:6/10/2019   2 Days Post-Op     Chief Pain Endorsement: mid and lower back pain    Recommendations were discussed and relayed to Twila West NP (ortho)  Plan was reviewed by the Inpatient Pain Service and staff attending, Dr. Caal      1. Decrease IV hydromorphone to 0.2 - 0.4 mg IV every 4 hours PRN for breakthrough pain not controlled by orals  2. Continue hydromorphone 4-6 mg PO every 3 hours PRN  3. Continue gabapentin 800 mg 4 times daily (PTA med)  4. Continue acetaminophen 975 mg every 8 hours scheduled  5. Continue Lidocaine 4% patches, 1 patch(es) transdermal every 24 hours (12 hours on and 12 hours off)  6. Continue Menthol 5% patches, 1 patch(es) transdermal every 8 hours as needed when Lidocaine 4% patches are off.   7. Bowel regimen per primary team to prevent opioid induced constipation.    Pain Service will Continue to follow at this time.     Thank you for consulting the Inpatient Pain Management Service. The above recommendations are to be acted upon at the primary team s discretion.     To reach us:  Mon - Friday 8 AM - 3 PM: Pager [phone] (Text Page)  After hours, weekends and holidays: Primary service should call [phone] for the on-call pain specialist    PAIN MEDICATION SAFE USE:   Prior to discharge instruct patient on the following in addition to the medication fact sheet:    Caution: these medications can cause sedation    Take prescription medicine only if it has been prescribed by your doctor    Do not take more medicine or take it more often than instructed     Call your doctor if pain gets worse    Never mix pain medicine with alcohol, sleeping pills, or any illicit drugs    Do not operate heavy machinery, including vehicles, when initiation opioid therapy or increasing dosage    Store prescription opioids in a locked container, whenever possible     Dispose of unused opioids appropriately     Do not stop  abruptly once at higher doses.  These medications must be tapered off.    Opioid pain medications do carry the risk for physical dependence and addiction and patients should be counseled about this.         1. Acute post operative mid to lower back pain s/p T10-pelvis revision fusion and removal of prior instrumentation.  Patient transfer from [facility] to [place] for closer monitoring due to lactic acidosis. Previous multiple spine surgeries including T10-S1 fusion and L2 kyphoplasty. Last spine surgery in 2018 was c/b wound infection.  2. Chronic pain of lower back, on chronic opioid management.  States PTA, he takes Dilaudid 4mg, 2 tablets 4x/day, everyday.  He states that he does not skip a dose.  3. H/o GERD  4. H/o depression    -- Outpatient opioid requirements prior to admission:   Dilaudid 4mg, 2 tablets 4x/day,   reviewed.  6/7/19  Gabapentin 800mg #112 tabs for  28 days  5/30/19 hydromorphone 4mg #224 tablets for 28 days     Primary Care Provider: Sarah Daly  Chronic Pain Provider: Dr. Ramírez.  [place], [place]    Interval History:  John Olvera was seen today (June 12, 2019) and he reports that he is doing well overall. He complains of pain, and claims he did not know that he needs to ask for his oral hydromorphone dose. He was focused more on his pain across his chest (from laying on it during surgery) and some blisters in his arm pits from being moved during surgery. He described the pain as burning and aching. He rated his pain as a 7-8/10 on the numeric pain scale and said that at home it's usually a 7-8 as well. He explained that the 3rd screw in his back that was in a nerve was finally removed, so he's hopeful his pain will improve dramatically. He's hopeful he will be able to stop taking gabapentin and his pain medications at some point. He is not experiencing any muscle spasms at this point. I encouraged him to take his oral hydromorphone and to reserve the IV hydromorphone for  severe breakthrough pain only. He hasn't been eating much and claims he just doesn't have much of an appetite, but was looking forward to trying Boost Nutritional supplements that Dr. Walters said he'd order. He didn't sleep well last night-- an hour or 2 intermittently. He had a bowel movement this morning. He thanked me for stopping by and had no further questions for me today.  CAPA (Clinically Aligned Pain Assessment):    Comfort (How is your pain?): Tolerable with discomfort  Change in Pain (Since your last medication/intervention?): Getting better  Pain Control (How are your pain treatments working?):  Partially effective control  Functioning (Are you able to do activities to get better?) : Pain keeps me from doing most of what I need to do  Sleep (Does your pain management allow you to sleep or rest?): Awake with occasional pain      FUNCTIONAL STATUS:  Change:      improving  Oral intake:     Regular  Activity level:     Up with assistance ambulating in room  Ambulating in penaloza  Mood:      adjusting to situation     -- Inpatient Medications Related to Pain Management:   Medications related to Pain Management (From now, onward)    Start     Dose/Rate Route Frequency Ordered Stop    06/12/19 0000  HYDROmorphone (DILAUDID) 4 MG tablet      4-6 mg Oral EVERY 3 HOURS PRN 06/12/19 0656      06/12/19 0000  methocarbamol (ROBAXIN) 500 MG tablet      500 mg Oral EVERY 6 HOURS PRN 06/12/19 0656      06/11/19 2142  HYDROmorphone (DILAUDID) tablet 4-6 mg      4-6 mg Oral EVERY 3 HOURS PRN 06/11/19 2142      06/11/19 1400  acetaminophen (TYLENOL) tablet 975 mg      975 mg Oral EVERY 8 HOURS SCHEDULED 06/11/19 1158      06/11/19 1201  methocarbamol (ROBAXIN) tablet 500 mg      500 mg Oral EVERY 6 HOURS PRN 06/11/19 1201      06/11/19 1200  HYDROmorphone (DILAUDID) injection 0.2-0.4 mg      0.2-0.4 mg Intravenous EVERY 2 HOURS PRN 06/11/19 1200      06/11/19 0800  polyethylene glycol (MIRALAX/GLYCOLAX) Packet 17 g       17 g Oral DAILY 06/10/19 2308      06/11/19 0800  gabapentin (NEURONTIN) tablet 800 mg      800 mg Oral 4 TIMES DAILY 06/11/19 0013      06/11/19 0100  zolpidem ER (AMBIEN CR) CR tablet 12.5 mg      12.5 mg Oral AT BEDTIME 06/11/19 0013      06/11/19 0012  senna-docusate (SENOKOT-S/PERICOLACE) 8.6-50 MG per tablet 1 tablet      1 tablet Oral 4 TIMES DAILY PRN 06/11/19 0013      06/10/19 2345  busPIRone (BUSPAR) tablet 15 mg      15 mg Oral DAILY WITH SUPPER 06/10/19 2308      06/10/19 2345  senna-docusate (SENOKOT-S/PERICOLACE) 8.6-50 MG per tablet 2 tablet      2 tablet Oral 2 TIMES DAILY 06/10/19 2308      06/10/19 2315  docusate sodium (COLACE) capsule 100 mg      100 mg Oral 2 TIMES DAILY 06/10/19 2308      06/10/19 2308  diphenhydrAMINE (BENADRYL) capsule 25 mg      25 mg Oral EVERY 6 HOURS PRN 06/10/19 2308      06/10/19 2308  diphenhydrAMINE (BENADRYL) injection 25 mg      25 mg Intravenous EVERY 6 HOURS PRN 06/10/19 2308      06/10/19 2308  bisacodyl (DULCOLAX) Suppository 10 mg      10 mg Rectal DAILY PRN 06/10/19 2308      06/10/19 2308  lidocaine 1 % 0.1-1 mL      0.1-1 mL Other EVERY 1 HOUR PRN 06/10/19 2308      06/10/19 2308  lidocaine (LMX4) cream       Topical EVERY 1 HOUR PRN 06/10/19 2308            LAB DATA:  Recent Labs   Lab 06/11/19  0455 06/11/19  0035 06/10/19  1955 06/10/19  1650   CR 1.27*  --  1.23  --    WBC 9.3 9.8  --   --    HGB 9.7* 9.7*  --  11.3*   AST  --   --  71*  --    ALT  --   --  42  --          ----------------------------------------------------------------------------------  Carri Banda, [facility]  Inpatient Pain Service     To reach us:  Mon - Friday 8 AM - 3 PM: Pager [phone] (Text Page)  After hours, weekends and holidays: Primary service should call [phone] for the on-call pain specialist    Helpful Resources:  Getting Rid of Unwanted Medications (printable PDF for patients)   Opioid Overdose Prevention Toolkit (printable PDF for patients)   Prescription  Opioids: What You Need To Know (printable PDF for patients)

## 2019-06-12 NOTE — PROGRESS NOTES
"[facility] Internal Medicine Progress Note            Interval History:   Record reviewed.  Seen with RN.  S/p Revision Thoracic 10-Pelvis Posterior Instrumented Fusion With O-Arm.Stealth, Lumbar 4 Pedicle Subtraction Osteotomy, Removal Of Prior Instrumentation on 6/10 with Dr. Sebastian and Dr. Orellana.  EBL 2200 ml's.  Elevated lactate > 4 with transfer to [facility] post op. Normalized with fluid.   Transferred back to [place] 6/11.  Chronic pain, opiate requirement (dilaudid 8 mg q6h and 3200 mg gabapentin at home).   Clinically doing reasonably well.  Adequate pain control on dilaudid 6 mg q3h prn.  Prone for prolonged period during surgery with soreness over chest.  Up to BR, ambulated short distance in penaloza without LH.   No CP, SOB, cough.  Off O2.  No, nausea, reflux, abd pain or distention.  Passing  flatus.  Last BM this AM.  Voiding OK.  Limited po intake.  On Boost at home.           Medications:   All medications reviewed today          Physical Exam:   Blood pressure 115/77, pulse 85, temperature 98.6  F (37  C), temperature source Oral, resp. rate 16, height 1.778 m (5' 10\"), weight 127.6 kg (281 lb 4.9 oz), SpO2 96 %.    Intake/Output Summary (Last 24 hours) at 6/12/2019 1354  Last data filed at 6/12/2019 0838  Gross per 24 hour   Intake 240 ml   Output 3330 ml   Net -3090 ml       General:  Alert.  Appropriate.  No distress.  Off O2.     Heent:      Neck:    Skin:    Chest:  clear    Cardiac:  Reg without gallop, murmur.  No JVD.     Abdomen:  Non distended, soft, non-tender.  BS normal.     Extremities:  Perfused.  No edema, calf, posterior thigh tenderness to suggest DVT.     Neuro:            Data:   No results found for this or any previous visit (from the past 24 hour(s)).    Last Comprehensive Metabolic Panel:  Sodium   Date Value Ref Range Status   06/11/2019 139 133 - 144 mmol/L Final     Potassium   Date Value Ref Range Status   06/11/2019 4.3 3.4 - 5.3 mmol/L Final     Chloride "   Date Value Ref Range Status   06/11/2019 106 94 - 109 mmol/L Final     Carbon Dioxide   Date Value Ref Range Status   06/11/2019 30 20 - 32 mmol/L Final     Anion Gap   Date Value Ref Range Status   06/11/2019 2 (L) 3 - 14 mmol/L Final     Glucose   Date Value Ref Range Status   06/11/2019 126 (H) 70 - 99 mg/dL Final     Urea Nitrogen   Date Value Ref Range Status   06/11/2019 14 7 - 30 mg/dL Final     Creatinine   Date Value Ref Range Status   06/11/2019 1.27 (H) 0.66 - 1.25 mg/dL Final     GFR Estimate   Date Value Ref Range Status   06/11/2019 61 >60 mL/min/[1.73_m2] Final     Comment:     Non  GFR Calc  Starting 12/18/2018, serum creatinine based estimated GFR (eGFR) will be   calculated using the Chronic Kidney Disease Epidemiology Collaboration   (CKD-EPI) equation.       Calcium   Date Value Ref Range Status   06/11/2019 7.7 (L) 8.5 - 10.1 mg/dL Final     Lab Results   Component Value Date    WBC 9.3 06/11/2019     Lab Results   Component Value Date    RBC 3.36 06/11/2019     Lab Results   Component Value Date    HGB 9.7 06/11/2019     Lab Results   Component Value Date    HCT 31.5 06/11/2019     Lab Results   Component Value Date    MCV 94 06/11/2019     Lab Results   Component Value Date    MCH 28.9 06/11/2019     Lab Results   Component Value Date    MCHC 30.8 06/11/2019     Lab Results   Component Value Date    RDW 13.2 06/11/2019     Lab Results   Component Value Date     06/11/2019   Lactic acid 1.9             Assessment and Plan:   1)  S/P Revision Thoracic 10-Pelvis Posterior Instrumented Fusion With O-Arm.Stealth, Lumbar 4 Pedicle Subtraction Osteotomy, Removal Of Prior Instrumentation on 6/10 with Dr. Sebastian and Dr. Orellana.  EBL 2200 ml's.  Adequate pain control.  Stable hemodynamics.  2)  Lactic acidosis normalized.  Likely related to decrease volume, hypoperfusion.  3)  Acute blood loss anemia.  Adequate.   4)  GERD on omeprazole.  5)  CKD, stable.   6)  Anxiety,  depression appears compensated.   7)  Thrombocytopenia c/w consumption.     PLAN:  1)  Review meds, orders.  2)  Same opiates, gabapentin, IS, bowel meds, mobilize, mechanical DVT prophylaxis.  3)  Add Boost.  4)  Adjust IV with limited po.  5)  Trend labs.  Monitor clinically.   Disposition Plan   Expected discharge in approx 2 days to prior living arrangement vs TCU pending course.  Lives in [place].      Entered: Miguel Walters 06/12/2019, 1:54 PM              Attestation:  I have reviewed today's vital signs, notes, medications, labs and imaging.     Miguel Walters MD

## 2019-06-12 NOTE — PLAN OF CARE
Discharge Planner OT   Patient plan for discharge: Home; patient independent in ADLs/mobility at baseline.  Reports having multiple spine surgeries in the past.  Patient lives alone but has family support close by.  Patient reports having 6 hour drive home; recommended to split up drive or stay at hotel close by initially.  Patient reports he believed he was going to have extended stay here with possible TCU/swing bed after discharge.  Educated that therapy recommendations will be home and per ortho note discharge may be in next 1-2 days.  Current status: Patient alert and oriented, motivated and very pleasant.  Completed supine to sit with SBA with bedrail, ambulation in room with walker or IV pole with SBA.  Completed g/h tasks standing at sink with increased pain after approximately 5 minutes.  Barriers to return to prior living situation: None  Recommendations for discharge: Home  Rationale for recommendations: Anticipate to meet OT goals in 1-2 days; no home OT needs anticipated.         Entered by: Maria Teresa Wallace 06/12/2019 12:40 PM

## 2019-06-12 NOTE — PLAN OF CARE
Discharge Planner PT   Patient plan for discharge: Home with assist  Current status: Pt demonstrated supine to/from sitting with HOB flat and mod A x 1.  Sit to/from standing from EOB with FWW and CGA x 1.  Pt ambulated 40' with FWW and CGA x 1.    Barriers to return to prior living situation: Level of assist, safety, and 3 steps to get into home  Recommendations for discharge: Home with assist  Rationale for recommendations: Pt would benefit from further skilled PT for strengthening and increase independence with all functional mobility/gait.        Entered by: Swetha Mora 06/12/2019 9:39 AM

## 2019-06-13 ENCOUNTER — APPOINTMENT (OUTPATIENT)
Dept: PHYSICAL THERAPY | Facility: CLINIC | Age: 59
DRG: 457 | End: 2019-06-13
Attending: ORTHOPAEDIC SURGERY
Payer: OTHER MISCELLANEOUS

## 2019-06-13 ENCOUNTER — APPOINTMENT (OUTPATIENT)
Dept: OCCUPATIONAL THERAPY | Facility: CLINIC | Age: 59
DRG: 457 | End: 2019-06-13
Attending: ORTHOPAEDIC SURGERY
Payer: OTHER MISCELLANEOUS

## 2019-06-13 LAB
ANION GAP SERPL CALCULATED.3IONS-SCNC: 5 MMOL/L (ref 3–14)
BUN SERPL-MCNC: 9 MG/DL (ref 7–30)
CALCIUM SERPL-MCNC: 7.6 MG/DL (ref 8.5–10.1)
CHLORIDE SERPL-SCNC: 108 MMOL/L (ref 94–109)
CO2 SERPL-SCNC: 28 MMOL/L (ref 20–32)
CREAT SERPL-MCNC: 1.13 MG/DL (ref 0.66–1.25)
ERYTHROCYTE [DISTWIDTH] IN BLOOD BY AUTOMATED COUNT: 13.2 % (ref 10–15)
GFR SERPL CREATININE-BSD FRML MDRD: 71 ML/MIN/{1.73_M2}
GLUCOSE SERPL-MCNC: 143 MG/DL (ref 70–99)
HCT VFR BLD AUTO: 25.2 % (ref 40–53)
HGB BLD-MCNC: 7.8 G/DL (ref 13.3–17.7)
MAGNESIUM SERPL-MCNC: 2 MG/DL (ref 1.6–2.3)
MCH RBC QN AUTO: 28.1 PG (ref 26.5–33)
MCHC RBC AUTO-ENTMCNC: 31 G/DL (ref 31.5–36.5)
MCV RBC AUTO: 91 FL (ref 78–100)
PLATELET # BLD AUTO: 104 10E9/L (ref 150–450)
POTASSIUM SERPL-SCNC: 3.6 MMOL/L (ref 3.4–5.3)
RBC # BLD AUTO: 2.78 10E12/L (ref 4.4–5.9)
SODIUM SERPL-SCNC: 141 MMOL/L (ref 133–144)
WBC # BLD AUTO: 6.3 10E9/L (ref 4–11)

## 2019-06-13 PROCEDURE — 85027 COMPLETE CBC AUTOMATED: CPT | Performed by: INTERNAL MEDICINE

## 2019-06-13 PROCEDURE — 36415 COLL VENOUS BLD VENIPUNCTURE: CPT | Performed by: INTERNAL MEDICINE

## 2019-06-13 PROCEDURE — 25000132 ZZH RX MED GY IP 250 OP 250 PS 637: Performed by: NURSE PRACTITIONER

## 2019-06-13 PROCEDURE — 25000132 ZZH RX MED GY IP 250 OP 250 PS 637: Performed by: INTERNAL MEDICINE

## 2019-06-13 PROCEDURE — 25000132 ZZH RX MED GY IP 250 OP 250 PS 637: Performed by: STUDENT IN AN ORGANIZED HEALTH CARE EDUCATION/TRAINING PROGRAM

## 2019-06-13 PROCEDURE — 25000132 ZZH RX MED GY IP 250 OP 250 PS 637: Performed by: PHYSICIAN ASSISTANT

## 2019-06-13 PROCEDURE — 97116 GAIT TRAINING THERAPY: CPT | Mod: GP | Performed by: PHYSICAL THERAPIST

## 2019-06-13 PROCEDURE — 25800030 ZZH RX IP 258 OP 636: Performed by: INTERNAL MEDICINE

## 2019-06-13 PROCEDURE — 97535 SELF CARE MNGMENT TRAINING: CPT | Mod: GO | Performed by: OCCUPATIONAL THERAPIST

## 2019-06-13 PROCEDURE — 83735 ASSAY OF MAGNESIUM: CPT | Performed by: INTERNAL MEDICINE

## 2019-06-13 PROCEDURE — 97530 THERAPEUTIC ACTIVITIES: CPT | Mod: GO | Performed by: OCCUPATIONAL THERAPIST

## 2019-06-13 PROCEDURE — 99207 ZZC NO CHARGE FOLLOW UP PS: CPT

## 2019-06-13 PROCEDURE — 25000128 H RX IP 250 OP 636: Performed by: STUDENT IN AN ORGANIZED HEALTH CARE EDUCATION/TRAINING PROGRAM

## 2019-06-13 PROCEDURE — 97530 THERAPEUTIC ACTIVITIES: CPT | Mod: GP | Performed by: PHYSICAL THERAPIST

## 2019-06-13 PROCEDURE — 80048 BASIC METABOLIC PNL TOTAL CA: CPT | Performed by: INTERNAL MEDICINE

## 2019-06-13 PROCEDURE — 25000128 H RX IP 250 OP 636: Performed by: PHYSICIAN ASSISTANT

## 2019-06-13 PROCEDURE — 99232 SBSQ HOSP IP/OBS MODERATE 35: CPT | Performed by: INTERNAL MEDICINE

## 2019-06-13 PROCEDURE — 12000001 ZZH R&B MED SURG/OB UMMC

## 2019-06-13 RX ORDER — HYDROMORPHONE HYDROCHLORIDE 2 MG/1
6-8 TABLET ORAL
Status: DISCONTINUED | OUTPATIENT
Start: 2019-06-13 | End: 2019-06-18 | Stop reason: HOSPADM

## 2019-06-13 RX ADMIN — RANITIDINE 150 MG: 150 TABLET ORAL at 20:20

## 2019-06-13 RX ADMIN — HYDROMORPHONE HYDROCHLORIDE 6 MG: 2 TABLET ORAL at 01:54

## 2019-06-13 RX ADMIN — BUSPIRONE HYDROCHLORIDE 15 MG: 7.5 TABLET ORAL at 17:32

## 2019-06-13 RX ADMIN — HYDROMORPHONE HYDROCHLORIDE 8 MG: 2 TABLET ORAL at 14:19

## 2019-06-13 RX ADMIN — CALCIUM CARBONATE (ANTACID) CHEW TAB 500 MG 500 MG: 500 CHEW TAB at 02:00

## 2019-06-13 RX ADMIN — ZOLPIDEM TARTRATE 12.5 MG: 6.25 TABLET, EXTENDED RELEASE ORAL at 21:41

## 2019-06-13 RX ADMIN — Medication 0.2 MG: at 10:37

## 2019-06-13 RX ADMIN — SENNOSIDES AND DOCUSATE SODIUM 2 TABLET: 8.6; 5 TABLET ORAL at 08:37

## 2019-06-13 RX ADMIN — PROMETHAZINE HYDROCHLORIDE 25 MG: 25 TABLET ORAL at 08:37

## 2019-06-13 RX ADMIN — SENNOSIDES AND DOCUSATE SODIUM 2 TABLET: 8.6; 5 TABLET ORAL at 20:19

## 2019-06-13 RX ADMIN — GABAPENTIN 800 MG: 800 TABLET, FILM COATED ORAL at 11:32

## 2019-06-13 RX ADMIN — SODIUM CHLORIDE: 9 INJECTION, SOLUTION INTRAVENOUS at 02:00

## 2019-06-13 RX ADMIN — TRAZODONE HYDROCHLORIDE 150 MG: 100 TABLET ORAL at 21:41

## 2019-06-13 RX ADMIN — HYDROMORPHONE HYDROCHLORIDE 8 MG: 2 TABLET ORAL at 20:19

## 2019-06-13 RX ADMIN — OMEPRAZOLE 20 MG: 20 CAPSULE, DELAYED RELEASE ORAL at 08:37

## 2019-06-13 RX ADMIN — Medication 900 MG: at 15:52

## 2019-06-13 RX ADMIN — HYDROMORPHONE HYDROCHLORIDE 8 MG: 2 TABLET ORAL at 17:33

## 2019-06-13 RX ADMIN — METHOCARBAMOL 500 MG: 500 TABLET, FILM COATED ORAL at 08:37

## 2019-06-13 RX ADMIN — METHOCARBAMOL 500 MG: 500 TABLET, FILM COATED ORAL at 00:20

## 2019-06-13 RX ADMIN — CEFAZOLIN SODIUM 2 G: 2 INJECTION, SOLUTION INTRAVENOUS at 23:40

## 2019-06-13 RX ADMIN — METHOCARBAMOL 500 MG: 500 TABLET, FILM COATED ORAL at 15:51

## 2019-06-13 RX ADMIN — CEFAZOLIN SODIUM 2 G: 2 INJECTION, SOLUTION INTRAVENOUS at 08:37

## 2019-06-13 RX ADMIN — CEFAZOLIN SODIUM 2 G: 2 INJECTION, SOLUTION INTRAVENOUS at 15:51

## 2019-06-13 RX ADMIN — Medication 900 MG: at 20:19

## 2019-06-13 RX ADMIN — DOCUSATE SODIUM 100 MG: 100 CAPSULE, LIQUID FILLED ORAL at 08:37

## 2019-06-13 RX ADMIN — HYDROMORPHONE HYDROCHLORIDE 6 MG: 2 TABLET ORAL at 08:37

## 2019-06-13 RX ADMIN — MENTHOL 1 PATCH: 205.5 PATCH TOPICAL at 06:46

## 2019-06-13 RX ADMIN — RANITIDINE 150 MG: 150 TABLET ORAL at 08:37

## 2019-06-13 RX ADMIN — ACETAMINOPHEN 975 MG: 325 TABLET, FILM COATED ORAL at 11:33

## 2019-06-13 RX ADMIN — ESCITALOPRAM OXALATE 20 MG: 20 TABLET ORAL at 08:37

## 2019-06-13 RX ADMIN — HYDROMORPHONE HYDROCHLORIDE 8 MG: 2 TABLET ORAL at 11:33

## 2019-06-13 RX ADMIN — GABAPENTIN 800 MG: 800 TABLET, FILM COATED ORAL at 08:37

## 2019-06-13 RX ADMIN — HYDROMORPHONE HYDROCHLORIDE 8 MG: 2 TABLET ORAL at 23:24

## 2019-06-13 RX ADMIN — HYDROXYZINE HYDROCHLORIDE 50 MG: 50 TABLET, FILM COATED ORAL at 00:20

## 2019-06-13 RX ADMIN — HYDROMORPHONE HYDROCHLORIDE 6 MG: 2 TABLET ORAL at 05:22

## 2019-06-13 RX ADMIN — ACETAMINOPHEN 975 MG: 325 TABLET, FILM COATED ORAL at 17:33

## 2019-06-13 ASSESSMENT — ACTIVITIES OF DAILY LIVING (ADL)
ADLS_ACUITY_SCORE: 11
ADLS_ACUITY_SCORE: 11
ADLS_ACUITY_SCORE: 13
ADLS_ACUITY_SCORE: 11
ADLS_ACUITY_SCORE: 13
ADLS_ACUITY_SCORE: 13

## 2019-06-13 NOTE — PROGRESS NOTES
Patient: John Olvera  Date of Service: June 13, 2019 Admission Date:6/10/2019   3 Days Post-Op     Chief Pain Endorsement: Back pain, BL leg pain    Recommendations were discussed and relayed to Twila West CNP  Plan was reviewed by the Inpatient Pain Service and staff attending, Dr. Lezama      1. Continue HYDROmorphone 6-8mg by mouth every 3 hour as needed moderate-severe pain     Ordered by primary this morning, patient receive his first 8mg dose at 1133.   2. Decrease IV HYDROmorphone 0.2-0.4mg IV every 3 hour as needed breakthrough pain not managed on orals.   3. Increase gabapentin 900mg by mouth four times daily   4. Defer trial of steroid/NSAID course to primary team.   5. Continue acetaminophen 975mg by mouth every 8 hour   6. Continue methocarbamol 500mg by mouth every 6 hour as needed muscle spasms   7. Continue Lidocaine cream   8. Continue Menthol 5% patches, 1 patch(es) transdermal every 8 hours as needed when Lidocaine 4% patches are off.   9. Bowel regimen per primary team to prevent opioid induced constipation.    Pain Service will Continue to follow at this time.     Thank you for consulting the Inpatient Pain Management Service. The above recommendations are to be acted upon at the primary team s discretion.     To reach us:  Mon - Friday 8 AM - 3 PM: Pager [phone] (Text Page)  After hours, weekends and holidays: Primary service should call [phone] for the on-call pain specialist    PAIN MEDICATION SAFE USE:   Prior to discharge instruct patient on the following in addition to the medication fact sheet:    Caution: these medications can cause sedation    Take prescription medicine only if it has been prescribed by your doctor    Do not take more medicine or take it more often than instructed     Call your doctor if pain gets worse    Never mix pain medicine with alcohol, sleeping pills, or any illicit drugs    Do not operate heavy machinery, including vehicles, when initiation  opioid therapy or increasing dosage    Store prescription opioids in a locked container, whenever possible     Dispose of unused opioids appropriately     Do not stop abruptly once at higher doses.  These medications must be tapered off.    Opioid pain medications do carry the risk for physical dependence and addiction and patients should be counseled about this.         1. Acute post operative mid to lower back pain s/p T10-pelvis revision fusion and removal of prior instrumentation.  Patient transfer from [facility] to [place] for closer monitoring due to lactic acidosis. Previous multiple spine surgeries including T10-S1 fusion and L2 kyphoplasty. Last spine surgery in 2018 was c/b wound infection.  2. BL lower extremity pain started last evening. No numbness but describes as sharp and burning. Pain stops at the knee. Patient reports surgical team is aware of this pain.   3. Chronic pain of lower back, on chronic opioid management.  States PTA, he takes Dilaudid 4mg, 2 tablets 4x/day, everyday.  He states that he does not skip a dose.  4. H/o GERD  5. H/o depression     -- Outpatient opioid requirements prior to admission:   Dilaudid 4mg, 2 tablets 4x/day,   reviewed.  6/7/19  Gabapentin 800mg #112 tabs for  28 days  5/30/19 hydromorphone 4mg #224 tablets for 28 days     Primary Care Provider: Sarah Daly  Chronic Pain Provider: Dr. Ramírez.  [place] [place]    Interval History:  John Olvera was seen today (June 13, 2019) and he reports that his back surgical pain and the previous armpit/chest pain are doing fine. Today's main concern was his bilateral leg pain that started yesterday evening. He reports the pain to be stabbing and burning and that there was no inciting event that brought about the pain. He reports that there has been nothing that has helped with the pain not IV's or orals. He denies any numbness and weakness as he is able to put weight on his legs as he walks. He states that  primary team is aware of this pain and that Dr. Sebastian may consider some steroids. His oral regimen was also increased this morning to see if it would help. There may be some inflamed nerves which may benefit with an increase in his gabapentin dose.     CAPA (Clinically Aligned Pain Assessment):    Comfort (How is your pain?): Intolerable  Change in Pain (Since your last medication/intervention?): Getting worse  Pain Control (How are your pain treatments working?):  Inadequate pain control  Functioning (Are you able to do activities to get better?) : Pain keeps me from doing most of what I need to do  Sleep (Does your pain management allow you to sleep or rest?): Awake with pain most of night      FUNCTIONAL STATUS:  Change:      staying the same  Oral intake:     Regular  Activity level:     Bedrest/ambulating in penaloza.   Mood:      adjusting to situation     -- Inpatient Medications Related to Pain Management:   Medications related to Pain Management (From now, onward)    Start     Dose/Rate Route Frequency Ordered Stop    06/13/19 1011  HYDROmorphone (DILAUDID) tablet 6-8 mg      6-8 mg Oral EVERY 3 HOURS PRN 06/13/19 1011      06/12/19 1548  HYDROmorphone (DILAUDID) injection 0.2-0.4 mg      0.2-0.4 mg Intravenous EVERY 1 HOUR PRN 06/12/19 1548      06/12/19 1547  hydrOXYzine (ATARAX) tablet 50 mg      50 mg Oral EVERY 6 HOURS PRN 06/12/19 1548      06/12/19 0000  HYDROmorphone (DILAUDID) 4 MG tablet      4-6 mg Oral EVERY 3 HOURS PRN 06/12/19 0656      06/12/19 0000  methocarbamol (ROBAXIN) 500 MG tablet      500 mg Oral EVERY 6 HOURS PRN 06/12/19 0656      06/11/19 1400  acetaminophen (TYLENOL) tablet 975 mg      975 mg Oral EVERY 8 HOURS SCHEDULED 06/11/19 1158      06/11/19 1201  methocarbamol (ROBAXIN) tablet 500 mg      500 mg Oral EVERY 6 HOURS PRN 06/11/19 1201      06/11/19 0800  polyethylene glycol (MIRALAX/GLYCOLAX) Packet 17 g      17 g Oral DAILY 06/10/19 2308      06/11/19 0800  gabapentin  (NEURONTIN) tablet 800 mg      800 mg Oral 4 TIMES DAILY 06/11/19 0013      06/11/19 0100  zolpidem ER (AMBIEN CR) CR tablet 12.5 mg      12.5 mg Oral AT BEDTIME 06/11/19 0013      06/11/19 0012  senna-docusate (SENOKOT-S/PERICOLACE) 8.6-50 MG per tablet 1 tablet      1 tablet Oral 4 TIMES DAILY PRN 06/11/19 0013      06/10/19 2345  busPIRone (BUSPAR) tablet 15 mg      15 mg Oral DAILY WITH SUPPER 06/10/19 2308      06/10/19 2345  senna-docusate (SENOKOT-S/PERICOLACE) 8.6-50 MG per tablet 2 tablet      2 tablet Oral 2 TIMES DAILY 06/10/19 2308      06/10/19 2315  docusate sodium (COLACE) capsule 100 mg      100 mg Oral 2 TIMES DAILY 06/10/19 2308      06/10/19 2308  diphenhydrAMINE (BENADRYL) capsule 25 mg      25 mg Oral EVERY 6 HOURS PRN 06/10/19 2308      06/10/19 2308  diphenhydrAMINE (BENADRYL) injection 25 mg      25 mg Intravenous EVERY 6 HOURS PRN 06/10/19 2308      06/10/19 2308  bisacodyl (DULCOLAX) Suppository 10 mg      10 mg Rectal DAILY PRN 06/10/19 2308      06/10/19 2308  lidocaine 1 % 0.1-1 mL      0.1-1 mL Other EVERY 1 HOUR PRN 06/10/19 2308      06/10/19 2308  lidocaine (LMX4) cream       Topical EVERY 1 HOUR PRN 06/10/19 2308            LAB DATA:  Recent Labs   Lab 06/11/19  0455 06/11/19  0035 06/10/19  1955 06/10/19  1650   CR 1.27*  --  1.23  --    WBC 9.3 9.8  --   --    HGB 9.7* 9.7*  --  11.3*   AST  --   --  71*  --    ALT  --   --  42  --          ----------------------------------------------------------------------------------  [facility]  Inpatient Pain Service     To reach us:  Mon - Friday 8 AM - 3 PM: Pager [phone] (Text Page)  After hours, weekends and holidays: Primary service should call [phone] for the on-call pain specialist    Helpful Resources:  Getting Rid of Unwanted Medications (printable PDF for patients)   Opioid Overdose Prevention Toolkit (printable PDF for patients)   Prescription Opioids: What You Need To Know (printable PDF for patients)

## 2019-06-13 NOTE — PROGRESS NOTES
"[facility] Internal Medicine Progress Note            Interval History:   Record reviewed.  Seen with RN.  S/p Revision Thoracic 10-Pelvis Posterior Instrumented Fusion With O-Arm.Stealth, Lumbar 4 Pedicle Subtraction Osteotomy, Removal Of Prior Instrumentation on 6/10 with Dr. Sebastian and Dr. Orellana.  EBL 2200 ml's.  Pain control tolerable except for interval onset of mod severe bilateral thigh burning dysesthesia like discomfort.  Impacting mobility.  Per patient spine service considering steroids.  Ambulated 3/4's way down penaloza 6/12.  Up to BR without LH.  No CP, SOB, cough, nausea, reflux, abd pain.  BM 6/12.  Voiding OK.  Appetite improved.  Taking Boost.           Medications:   All medications reviewed today          Physical Exam:   Blood pressure 144/60, pulse 90, temperature 99.6  F (37.6  C), temperature source Oral, resp. rate 16, height 1.778 m (5' 10\"), weight 127.6 kg (281 lb 4.9 oz), SpO2 94 %.    Intake/Output Summary (Last 24 hours) at 6/12/2019 1354  Last data filed at 6/12/2019 0838  Gross per 24 hour   Intake 240 ml   Output 3330 ml   Net -3090 ml       General:  Alert.  Appropriate.  No distress.  Off O2.     Heent:      Neck:    Skin:    Chest:  clear    Cardiac:  Reg without gallop, murmur.  No JVD.     Abdomen:  Non distended, soft, non-tender.  BS normal.     Extremities:  Perfused.  No edema, calf, posterior thigh tenderness to suggest DVT.     Neuro:            Data:     Results for orders placed or performed during the hospital encounter of 06/10/19 (from the past 24 hour(s))   Basic metabolic panel   Result Value Ref Range    Sodium 141 133 - 144 mmol/L    Potassium 3.6 3.4 - 5.3 mmol/L    Chloride 108 94 - 109 mmol/L    Carbon Dioxide 28 20 - 32 mmol/L    Anion Gap 5 3 - 14 mmol/L    Glucose 143 (H) 70 - 99 mg/dL    Urea Nitrogen 9 7 - 30 mg/dL    Creatinine 1.13 0.66 - 1.25 mg/dL    GFR Estimate 71 >60 mL/min/[1.73_m2]    GFR Estimate If Black 82 >60 mL/min/[1.73_m2]    " Calcium 7.6 (L) 8.5 - 10.1 mg/dL   Magnesium   Result Value Ref Range    Magnesium 2.0 1.6 - 2.3 mg/dL   CBC with platelets   Result Value Ref Range    WBC 6.3 4.0 - 11.0 10e9/L    RBC Count 2.78 (L) 4.4 - 5.9 10e12/L    Hemoglobin 7.8 (L) 13.3 - 17.7 g/dL    Hematocrit 25.2 (L) 40.0 - 53.0 %    MCV 91 78 - 100 fl    MCH 28.1 26.5 - 33.0 pg    MCHC 31.0 (L) 31.5 - 36.5 g/dL    RDW 13.2 10.0 - 15.0 %    Platelet Count 104 (L) 150 - 450 10e9/L     Hemoglobin   Date Value Ref Range Status   06/13/2019 7.8 (L) 13.3 - 17.7 g/dL Final   06/11/2019 9.7 (L) 13.3 - 17.7 g/dL Final                Assessment and Plan:   1)  S/P Revision Thoracic 10-Pelvis Posterior Instrumented Fusion With O-Arm.Stealth, Lumbar 4 Pedicle Subtraction Osteotomy, Removal Of Prior Instrumentation on 6/10 with Dr. Sebastian and Dr. Orellana.  EBL 2200 ml's.  Adequate pain control except for issue bilateral thigh dysesthesia discomfort.  Already on 3200 mg gabapentin.   2)  Lactic acidosis normalized.  Likely related to decrease volume, hypoperfusion.  3)  Acute blood loss anemia.  Adequate. Interval drop possible dilutional component.   4)  GERD controlled on omeprazole.  5)  CKD, stable.   6)  Anxiety, depression appears compensated.   7)  Thrombocytopenia c/w consumption.   Relatively stable.     PLAN:  1) Spine considering steroids.   2)  Same opiates, gabapentin, IS, bowel meds, mobilize, mechanical DVT prophylaxis.  3)  SL IV.   Boost.  4)  Trend labs.  Monitor clinically.   Disposition Plan   Expected discharge in approx 1-2 days to prior living arrangement vs TCU pending course.  Lives in [place].      Entered: Miguel Walters 06/13/2019, 2:41 PM              Attestation:  I have reviewed today's vital signs, notes, medications, labs and imaging.     Miguel Watlers MD

## 2019-06-13 NOTE — PLAN OF CARE
Discharge Planner PT   Patient plan for discharge: Pt prefers to discharge to TCU due to living alone  Current status: Pt demonstrated bed mobility with SBA to min A x 1 (needs assist with lifting LEs into bed)  Sit to/from standing from EOB with FWW and CGA x 1.  Pt ambulated 75' with FWW and CGA to SBA x 1.  Pt gait limited due to sharp buring pain in left thigh.    Barriers to return to prior living situation: Level of assist, safety, stairs to get into home, and lives alone  Recommendations for discharge: TCU   Rationale for recommendations: Pt would benefit from further skilled PT for LE strengthening and increase independence with all functional mobility/gait.        Entered by: Swetha Mora 06/13/2019 3:49 PM

## 2019-06-13 NOTE — PLAN OF CARE
Attempted PT session this AM but pt declined due to increased pain this AM.  PT session cancelled.

## 2019-06-13 NOTE — PLAN OF CARE
"      VS:   /44 (BP Location: Right arm)   Pulse 91   Temp 98.1  F (36.7  C) (Oral)   Resp 16   Ht 1.778 m (5' 10\")   Wt 127.6 kg (281 lb 4.9 oz)   SpO2 94%   BMI 40.36 kg/m       Output:      Lungs    Activity:      Skin:    Pain:      Neuro/CMS:      Dressing(s):      Diet:      LDA:      Equipment:      Plan:      Additional Info:          "

## 2019-06-13 NOTE — PROGRESS NOTES
"Orthopaedic Surgery  Progress Note     Subjective: No acute events overnight. Pain controlled.     Denies fever or chills, CP, SOB, numbness or tingling, motor dysfunction or weakness.     Objective:  /44 (BP Location: Right arm)   Pulse 91   Temp 98.1  F (36.7  C) (Oral)   Resp 16   Ht 1.778 m (5' 10\")   Wt 127.6 kg (281 lb 4.9 oz)   SpO2 94%   BMI 40.36 kg/m    General: NAD, alert and oriented, cooperative with exam.  Cardio: RRR, extremities wwp.   Respiratory: Non-labored breathing.  MSK: Dressing c/d/i. DP and PT 2+. Blisters on the anterior shoulders.     Sensation: intact to light touch L3-S1 distribution BLE    Lower Extremity Strength Left Right   L2-3: Hip flexion 5/5 5/5   L4:  Knee extension  5/5 5/5   L5:  Foot / EHL dorsiflexion 5/5 5/5   S1:  Plantarflexion  5/5 5/5       Labs:  Recent Labs   Lab 06/11/19  0455 06/11/19  0035 06/10/19  1650 06/10/19  1344 06/10/19  1129 06/10/19  0848   HGB 9.7* 9.7* 11.3* 13.4 11.5* 11.2*   WBC 9.3 9.8  --   --   --   --        I/O last 3 completed shifts:  In: 240 [P.O.:240]  Out: 1862 [Urine:1650; Drains:212]    All cultures:  Recent Labs   Lab 06/11/19  0036 06/11/19  0035 06/10/19  0942 06/10/19  0940   CULT No growth after 1 day No growth after 1 day Culture negative monitoring continues  Culture negative monitoring continues Culture negative monitoring continues  Culture negative monitoring continues       Assessment:  John Olvera is a 59 year old male with PMH including chronic pain, depression, pre-diabetes, history of wound infections now s/p Revision Thoracic 10-Pelvis Posterior Instrumented Fusion With O-Arm.Stealth, Lumbar 4 Pedicle Subtraction Osteotomy, Removal Of Prior Instrumentation on 6/10 with Dr. Sebastian and Dr. Orellana.      Orthopaedics Primary; Hoapitalist comanagement  Activity: Up with assist until independent. No excessive bending or twisting. No lifting >10 lbs x 6 weeks. No Marine lift for transfers  Weight bearing status: " WBAT  Pain management: No NSAIDs   Antibiotics: Ancef until drains removed  Diet: regular  DVT prophylaxis: SCDs only. No chemical DVT ppx needed.  Imaging: done  Bracing/Splinting: None.  Dressings: Keep Aquacel c/d/i x 7 days.  Drains: Hemovac and STAN drains. Document output per shift. Accordion drain to be removed 6/13; STAN will remain in.  Leon catheter: out  Physical Therapy/Occupational Therapy: Eval and treat.  Cultures: Pending, follow culture results closely.  Aerobic and anaerobic  Consults: Hospitalist, Pain management    Follow-up: Clinic with Dr. Sebastian in 6 weeks with repeat x-rays    Disposition: Pending progress with therapies, pain control on orals, and medical stability, anticipate discharge to home vs TCU Friday    Henrry Holguin MD  Orthopaedic Resident, PGY-4  Pager: [phone]

## 2019-06-13 NOTE — PLAN OF CARE
"Vitals: /84 (BP Location: Right arm)   Pulse 90   Temp 97.9  F (36.6  C) (Oral)   Resp 16   Ht 1.778 m (5' 10\")   Wt 127.6 kg (281 lb 4.9 oz)   SpO2 97%   BMI 40.36 kg/m    A&O x 4.  Lung sounds clear. Denies CP, SOB.   Output: Voiding independently without difficulty.  Last BM: 6/12   Activity: Up SBA with walker   Skin: Intact ex incision, bilateral shoulder blisters (L blistered, R scabbed), bruising.   Pain: Incisional radiating down legs - managed with repositioning and PRN dilaudid and robaxin   CMS/Neuro: Intact.   Dressing: CDI  Tegaderm applied to L shoulder blisters   Diet: Regular - tolerated well.   LDA: R PIV - SL   Equipment:    Plan/Add'l info: Able to make needs known. Call light within reach. Continue with POC.         "

## 2019-06-13 NOTE — PLAN OF CARE
"      VS:   /44 (BP Location: Right arm)   Pulse 91   Temp 98.1  F (36.7  C) (Oral)   Resp 16   Ht 1.778 m (5' 10\")   Wt 127.6 kg (281 lb 4.9 oz)   SpO2 94%   BMI 40.36 kg/m       Output:   Voids spontaneously, LBM 6/12/19   Lungs Clear    Activity:   SBA    Skin: Intact except Incision on spine    Pain:   Well controlled with current pain regimen    Neuro/CMS:   A&O x 4, cms intact and patient has baseline Numbness in hands.    Dressing(s):   Moderate drainage, dressing marked    Diet:   Regular    LDA:   PIV infusing, Hemovac and drain    Equipment:   IV pole and walker    Plan:   Continue to monitor per POC    Additional Info:          "

## 2019-06-13 NOTE — PLAN OF CARE
VS:    Temp: 98.1  F (36.7  C) Temp src: Oral BP: 117/44 Pulse: 91 Heart Rate: 103 Resp: 16 SpO2: 94 % O2 Device: None (Room air)    Vitals stable  Lung sounds are clear   Output:    Voiding spontaneously, using urinal at bedside. Passing flatus - last BM 6/12   Activity:    Ambulated to bed with walker and assist of 1.    Skin: Incision - otherwise intact.    Pain:    Pt reports moderate back pain partially controled with 4mg dilaudid Q3hrs.     Neuro/CMS:    Some baseline numbness and tingling in left hand - otherwise intact. Alert and oriented X4.    Dressing(s):    Aquacel dressing clean/dry/intact.    Diet:    Regular diet. Reports no appetite.    LDA:    Left PIV infusing normal saline at 75/hr.   Right PIV saline locked.    Equipment:    IV pole.    Plan:    Discharge plan to be determined.    Additional Info:    Able to make needs known. Will continue with plan of care.

## 2019-06-13 NOTE — PLAN OF CARE
Discharge Planner OT   Patient plan for discharge: Home vs. TCU; lives in [place] (6 hours away)  Current status: Patient with increased pain in R lower back which reports started 6/12/19, anterior R LE to knee, reports increased pain beginning in L LE.  Patient with decreased standing/sitting tolerance compared to 6/12/19.  Patient frustrated with new pain.  Barriers to return to prior living situation: Pain, limited ROM  Recommendations for discharge: Home vs. TCU  Rationale for recommendations: Continued daily OT for maximum independence in ADLs/mobility       Entered by: Maria Teresa Wallace 06/13/2019 8:21 AM

## 2019-06-14 ENCOUNTER — APPOINTMENT (OUTPATIENT)
Dept: PHYSICAL THERAPY | Facility: CLINIC | Age: 59
DRG: 457 | End: 2019-06-14
Attending: ORTHOPAEDIC SURGERY
Payer: OTHER MISCELLANEOUS

## 2019-06-14 ENCOUNTER — APPOINTMENT (OUTPATIENT)
Dept: OCCUPATIONAL THERAPY | Facility: CLINIC | Age: 59
DRG: 457 | End: 2019-06-14
Attending: ORTHOPAEDIC SURGERY
Payer: OTHER MISCELLANEOUS

## 2019-06-14 LAB
ANION GAP SERPL CALCULATED.3IONS-SCNC: 7 MMOL/L (ref 3–14)
BUN SERPL-MCNC: 9 MG/DL (ref 7–30)
CALCIUM SERPL-MCNC: 7.6 MG/DL (ref 8.5–10.1)
CHLORIDE SERPL-SCNC: 105 MMOL/L (ref 94–109)
CO2 SERPL-SCNC: 27 MMOL/L (ref 20–32)
CREAT SERPL-MCNC: 1.17 MG/DL (ref 0.66–1.25)
ERYTHROCYTE [DISTWIDTH] IN BLOOD BY AUTOMATED COUNT: 13.3 % (ref 10–15)
GFR SERPL CREATININE-BSD FRML MDRD: 68 ML/MIN/{1.73_M2}
GLUCOSE SERPL-MCNC: 128 MG/DL (ref 70–99)
HCT VFR BLD AUTO: 28.3 % (ref 40–53)
HGB BLD-MCNC: 8.9 G/DL (ref 13.3–17.7)
MCH RBC QN AUTO: 28.5 PG (ref 26.5–33)
MCHC RBC AUTO-ENTMCNC: 31.4 G/DL (ref 31.5–36.5)
MCV RBC AUTO: 91 FL (ref 78–100)
PLATELET # BLD AUTO: 159 10E9/L (ref 150–450)
POTASSIUM SERPL-SCNC: 3.5 MMOL/L (ref 3.4–5.3)
RBC # BLD AUTO: 3.12 10E12/L (ref 4.4–5.9)
SODIUM SERPL-SCNC: 139 MMOL/L (ref 133–144)
WBC # BLD AUTO: 6.7 10E9/L (ref 4–11)

## 2019-06-14 PROCEDURE — 99232 SBSQ HOSP IP/OBS MODERATE 35: CPT | Performed by: INTERNAL MEDICINE

## 2019-06-14 PROCEDURE — 25000132 ZZH RX MED GY IP 250 OP 250 PS 637: Performed by: STUDENT IN AN ORGANIZED HEALTH CARE EDUCATION/TRAINING PROGRAM

## 2019-06-14 PROCEDURE — 12000001 ZZH R&B MED SURG/OB UMMC

## 2019-06-14 PROCEDURE — 25000132 ZZH RX MED GY IP 250 OP 250 PS 637: Performed by: INTERNAL MEDICINE

## 2019-06-14 PROCEDURE — 80048 BASIC METABOLIC PNL TOTAL CA: CPT | Performed by: INTERNAL MEDICINE

## 2019-06-14 PROCEDURE — 97530 THERAPEUTIC ACTIVITIES: CPT | Mod: GP | Performed by: PHYSICAL THERAPIST

## 2019-06-14 PROCEDURE — 25000132 ZZH RX MED GY IP 250 OP 250 PS 637: Performed by: PHYSICIAN ASSISTANT

## 2019-06-14 PROCEDURE — 97110 THERAPEUTIC EXERCISES: CPT | Mod: GP | Performed by: PHYSICAL THERAPIST

## 2019-06-14 PROCEDURE — 99207 ZZC NO CHARGE FOLLOW UP PS: CPT

## 2019-06-14 PROCEDURE — 85027 COMPLETE CBC AUTOMATED: CPT | Performed by: INTERNAL MEDICINE

## 2019-06-14 PROCEDURE — 97535 SELF CARE MNGMENT TRAINING: CPT | Mod: GO | Performed by: OCCUPATIONAL THERAPIST

## 2019-06-14 PROCEDURE — 25000132 ZZH RX MED GY IP 250 OP 250 PS 637: Performed by: NURSE PRACTITIONER

## 2019-06-14 PROCEDURE — 97116 GAIT TRAINING THERAPY: CPT | Mod: GP | Performed by: PHYSICAL THERAPIST

## 2019-06-14 PROCEDURE — 36415 COLL VENOUS BLD VENIPUNCTURE: CPT | Performed by: INTERNAL MEDICINE

## 2019-06-14 RX ADMIN — BUSPIRONE HYDROCHLORIDE 15 MG: 7.5 TABLET ORAL at 16:46

## 2019-06-14 RX ADMIN — OMEPRAZOLE 20 MG: 20 CAPSULE, DELAYED RELEASE ORAL at 08:53

## 2019-06-14 RX ADMIN — HYDROMORPHONE HYDROCHLORIDE 8 MG: 2 TABLET ORAL at 08:53

## 2019-06-14 RX ADMIN — HYDROXYZINE HYDROCHLORIDE 50 MG: 50 TABLET, FILM COATED ORAL at 02:20

## 2019-06-14 RX ADMIN — TRAZODONE HYDROCHLORIDE 150 MG: 100 TABLET ORAL at 22:08

## 2019-06-14 RX ADMIN — CALCIUM CARBONATE (ANTACID) CHEW TAB 500 MG 500 MG: 500 CHEW TAB at 09:31

## 2019-06-14 RX ADMIN — Medication 900 MG: at 19:44

## 2019-06-14 RX ADMIN — PROMETHAZINE HYDROCHLORIDE 25 MG: 25 TABLET ORAL at 09:01

## 2019-06-14 RX ADMIN — Medication 900 MG: at 08:53

## 2019-06-14 RX ADMIN — Medication 900 MG: at 15:38

## 2019-06-14 RX ADMIN — HYDROMORPHONE HYDROCHLORIDE 8 MG: 2 TABLET ORAL at 02:19

## 2019-06-14 RX ADMIN — SENNOSIDES AND DOCUSATE SODIUM 1 TABLET: 8.6; 5 TABLET ORAL at 08:57

## 2019-06-14 RX ADMIN — HYDROMORPHONE HYDROCHLORIDE 8 MG: 2 TABLET ORAL at 12:32

## 2019-06-14 RX ADMIN — RANITIDINE 150 MG: 150 TABLET ORAL at 19:44

## 2019-06-14 RX ADMIN — ACETAMINOPHEN 975 MG: 325 TABLET, FILM COATED ORAL at 09:31

## 2019-06-14 RX ADMIN — METHOCARBAMOL 500 MG: 500 TABLET, FILM COATED ORAL at 15:38

## 2019-06-14 RX ADMIN — RANITIDINE 150 MG: 150 TABLET ORAL at 08:54

## 2019-06-14 RX ADMIN — METHOCARBAMOL 500 MG: 500 TABLET, FILM COATED ORAL at 02:19

## 2019-06-14 RX ADMIN — ACETAMINOPHEN 975 MG: 325 TABLET, FILM COATED ORAL at 02:20

## 2019-06-14 RX ADMIN — ZOLPIDEM TARTRATE 12.5 MG: 6.25 TABLET, EXTENDED RELEASE ORAL at 22:08

## 2019-06-14 RX ADMIN — Medication 900 MG: at 12:52

## 2019-06-14 RX ADMIN — HYDROMORPHONE HYDROCHLORIDE 8 MG: 2 TABLET ORAL at 16:46

## 2019-06-14 RX ADMIN — HYDROMORPHONE HYDROCHLORIDE 8 MG: 2 TABLET ORAL at 22:08

## 2019-06-14 RX ADMIN — ACETAMINOPHEN 975 MG: 325 TABLET, FILM COATED ORAL at 19:44

## 2019-06-14 RX ADMIN — ESCITALOPRAM OXALATE 20 MG: 20 TABLET ORAL at 08:53

## 2019-06-14 RX ADMIN — HYDROMORPHONE HYDROCHLORIDE 8 MG: 2 TABLET ORAL at 05:50

## 2019-06-14 RX ADMIN — SENNOSIDES AND DOCUSATE SODIUM 1 TABLET: 8.6; 5 TABLET ORAL at 19:44

## 2019-06-14 ASSESSMENT — ACTIVITIES OF DAILY LIVING (ADL)
ADLS_ACUITY_SCORE: 11

## 2019-06-14 NOTE — PLAN OF CARE
Discharge Planner OT   Patient plan for discharge: Patient prefers to discharge to TCU; patient feels he is unable to tolerate traveling home to [place].  Patient's daughter is leaving today for [place].  Current status: Patient alert and oriented, reports pain with mobility in back and B LE (more in R LE today, reports L LE is improving).  Patient is very familiar with ADLs with spine precautions as reports has been dealing with this for 20 years.  SBA for toilet transfers and LB dressing with AE.  Completed tub transfer with SBA/CGA and ambulation 75' to/from tub room with slower mobility and increased pain.  Barriers to return to prior living situation: Pain; limited endurance for standing and sitting   Recommendations for discharge: Anticipate discharge to TCU as patient limited with mobility; able to complete basic ADLs so would support discharge home with assist as needed but may benefit from short TCU stay for increased tolerance with higher level ADLs and home management tasks  Rationale for recommendations: Continued OT for maximum independence in ADLs/mobility       Entered by: Maria Teresa Wallace 06/14/2019 8:16 AM

## 2019-06-14 NOTE — CONSULTS
Social Work: Assessment with Discharge Plan    Patient Name:  John Olvera  :  1960  Age:  59 year old  MRN:  [id number]  Risk/Complexity Score:  Filed Complexity Screen Score: 6  Completed assessment with:  Pt, 10A IDT    Presenting Information   Reason for Referral:  Discharge plan  Date of Intake:  2019  Referral Source:  PT/OT recommendations  Decision Maker:  pt  Alternate Decision Maker:  Per Policy, pt's NOK : Adult Daughter Latonia Castaneda    Living Situation:  House  Previous Functional Status:  Independent  Patient and family understanding of hospitalization:  Seeking spine surgery for lumbar stenosis  Cultural/Language/Spiritual Considerations:    Adjustment to Illness:  Anticipating need for TCU    Physical Health  Reason for Admission:    1. S/P spinal surgery      Services Needed/Recommended:  TCU    Mental Health/Chemical Dependency  Diagnosis:  None noted  Support/Services in Place:  NA  Services Needed/Recommended:  NA    Support System  Significant relationship at present time:  Daughter and son in law-but they will be driving back to [facility] today  Family of origin is available for support:  yes  Other support available:  Friends, other family in [place] [facility]  Gaps in support system:  None noted  Patient is caregiver to:  None     Provider Information   Primary Care Physician:  Sarah Daly   [phone]   Clinic:  [facility] [facility] [facility] [address] / [facility]*      :      Financial   Income Source:  employment  Financial Concerns:  None noted  Insurance:    Payor/Plan Subscriber Name Rel Member # Group #   WORK COMP - [employer]* [employer]  [id number]       [address]       Discharge Plan   Patient and family discharge goal:  TCU in [facility]  Provided education on discharge plan:  YES  Patient agreeable to discharge plan:  YES  A list of Medicare Certified Facilities was provided to the patient and/or family to encourage  patient choice. Patient's choices for facility are:  Discussed facilities that are in  [facility]. Pt originally wanted  TCU ( they have extensive wait list and are full)  Will NH provide Skilled rehabilitation or complex medical:  YES  General information regarding anticipated insurance coverage and possible out of pocket cost was discussed. Patient and patient's family are aware patient may incur the cost of transportation to the facility, pending insurance payment: YES  Barriers to discharge:  Placement, medical stability    Discharge Recommendations   Anticipated Disposition:  [facility]   Transportation Needs:  Other:  to be determined      Additional comments   PROSPER introduced role/reason for visit. Pt was receptive and states need for con't therapy to improve mobility prior to being able to tolerate car ride to return to [facility].  He was hoping  TCU would have bed availability, but is accepting of having referrals to facilities in [facility] that would have [facility] team available to follow him. He anticipates having need for an additional week; and states his family can travel back to [place] when he is ready for return.     PROSPER con't to follow    Addendum:    PROSPER met w/pt and his daughter. PROSPER reviewed discharge plan-[facility] vs [facility]. Pt agrees to either facility.    Sandra at [facility] states they have contacted pt's work comp insurance. Pt's insurance is requesting clinicals but states they do not accept electronic submission (email or fax) and request it be sent via "[employer]S. PROSPER reviewed w/pt, who states that his Work Comp rep is Renata, and that they do accept electronic requests. PROSPER called and left voicemail message w/Renata, await call back.    Barrier to discharge is need for Work Comp authorization for [facility] and pt's medical stability. PROSPER con't to follow

## 2019-06-14 NOTE — PLAN OF CARE
Discharge Planner PT   Patient plan for discharge: TCU  Current status: Pt is able to complete log rolling with SBA x 1.  Sideyling to sitting at EOB with SBA x 1.  Sit to/from standing with FWW and CGA x 1.  Pt ambulated 100' with FWW and CGA x 1, had one episode of LE buckling.  Pt gait limited due to pain. Pt needed min A with lifting LEs into bed.   Barriers to return to prior living situation: Level of assist, safety, lives alone, and stairs  Recommendations for discharge: TCU  Rationale for recommendations: Pt would benefit from a TCU stay prior to discharge home in order to increase LE strength and increase independence with all functional mobility/gait.        Entered by: Swetha Mora 06/14/2019 2:11 PM

## 2019-06-14 NOTE — PROGRESS NOTES
"[facility] Internal Medicine Progress Note            Interval History:   Record reviewed.  Seen with RN.  S/p Revision Thoracic 10-Pelvis Posterior Instrumented Fusion With O-Arm.Stealth, Lumbar 4 Pedicle Subtraction Osteotomy, Removal Of Prior Instrumentation on 6/10 with Dr. Sebastian and Dr. Orellana.  EBL 2200 ml's.  Persistent RLE dysesthesia discomfort.  LLE improved.  Otherwise pain control tolerable.  No plan for steroids per spine.     Ambulated part way down penaloza and up to BR without LH.  No CP, SOB, cough, nausea, reflux, abd pain.  BM this AM.   Voiding OK.  Appetite improved.  Taking Boost.           Medications:   All medications reviewed today          Physical Exam:   Blood pressure 107/65, pulse 90, temperature 97.7  F (36.5  C), temperature source Axillary, resp. rate 16, height 1.778 m (5' 10\"), weight 127.6 kg (281 lb 4.9 oz), SpO2 93 %.    Intake/Output Summary (Last 24 hours) at 6/12/2019 1354  Last data filed at 6/12/2019 0838  Gross per 24 hour   Intake 240 ml   Output 3330 ml   Net -3090 ml       General:  Alert.  Appropriate.  No distress.  Off O2.     Heent:      Neck:    Skin:    Chest:  clear    Cardiac:  Reg without gallop, murmur.  No JVD.     Abdomen:  Non distended, soft, non-tender.  BS normal.     Extremities:  Perfused.  No edema, calf, posterior thigh tenderness to suggest DVT.     Neuro:            Data:     Results for orders placed or performed during the hospital encounter of 06/10/19 (from the past 24 hour(s))   Basic metabolic panel   Result Value Ref Range    Sodium 139 133 - 144 mmol/L    Potassium 3.5 3.4 - 5.3 mmol/L    Chloride 105 94 - 109 mmol/L    Carbon Dioxide 27 20 - 32 mmol/L    Anion Gap 7 3 - 14 mmol/L    Glucose 128 (H) 70 - 99 mg/dL    Urea Nitrogen 9 7 - 30 mg/dL    Creatinine 1.17 0.66 - 1.25 mg/dL    GFR Estimate 68 >60 mL/min/[1.73_m2]    GFR Estimate If Black 79 >60 mL/min/[1.73_m2]    Calcium 7.6 (L) 8.5 - 10.1 mg/dL   CBC with platelets   Result " Value Ref Range    WBC 6.7 4.0 - 11.0 10e9/L    RBC Count 3.12 (L) 4.4 - 5.9 10e12/L    Hemoglobin 8.9 (L) 13.3 - 17.7 g/dL    Hematocrit 28.3 (L) 40.0 - 53.0 %    MCV 91 78 - 100 fl    MCH 28.5 26.5 - 33.0 pg    MCHC 31.4 (L) 31.5 - 36.5 g/dL    RDW 13.3 10.0 - 15.0 %    Platelet Count 159 150 - 450 10e9/L     Hemoglobin   Date Value Ref Range Status   06/14/2019 8.9 (L) 13.3 - 17.7 g/dL Final   06/13/2019 7.8 (L) 13.3 - 17.7 g/dL Final                Assessment and Plan:   1)  S/P Revision Thoracic 10-Pelvis Posterior Instrumented Fusion With O-Arm.Stealth, Lumbar 4 Pedicle Subtraction Osteotomy, Removal Of Prior Instrumentation on 6/10 with Dr. Sebastian and Dr. Orellana.  EBL 2200 ml's.  Adequate pain control limited primarily by RLE dysesthesia.  LLE pain improved. Already on high dose gabapentin.    2)  Lactic acidosis normalized.  Likely related to decrease volume, hypoperfusion.  3)  Acute blood loss anemia.  Adequate. Stable.    4)  GERD controlled on omeprazole.  5)  CKD, stable.   6)  Anxiety, depression appears compensated.   7)  Thrombocytopenia c/w consumption.   Improved.     PLAN:  1)  Working on TCU.   2)  Same opiates, gabapentin, IS, bowel meds, mobilize, mechanical DVT prophylaxis.  3)  SL IV.   Boost.  4)  Trend labs.  Monitor clinically.   Disposition Plan   Expected discharge in approx 1-2 days to  TCU pending course.  Lives in [place].      Entered: Miguel Walters 06/14/2019, 2:37 PM              Attestation:  I have reviewed today's vital signs, notes, medications, labs and imaging.     Miguel Walters MD

## 2019-06-14 NOTE — PROGRESS NOTES
Patient: John Olvera  Date of Service: June 14, 2019 Admission Date:6/10/2019   4 Days Post-Op     Chief Pain Endorsement: Back pain, bilateral leg pain    Recommendations were discussed and relayed to Twila West CNP  Plan was reviewed by the Inpatient Pain Service and staff attending, Dr. Cristo Lezama MD.      1. Decrease frequency of IV hydromorphone to 0.2-0.4mg IV twice daily as needed for breakthrough pain not covered by orals, or for activity with PT. Patient has not been using many doses.    Discontinue IV hydromorphone tomorrow in preparation for discharge.  2. Continue hydromorphone 6-8mg PO every 3 hours as needed for pain.    Taper over usual course of healing to patient's PTA dose of hydromorphone 4mg, two tablets PO four times daily.  3. Continue gabapentin 900mg PO four times daily.    Can continue 900mg four times daily for one week following discharge, then decrease to patient's PTA dose of 800mg four times daily.  4. Continue acetaminophen 975mg PO every 8 hours scheduled.    Take APAP schedule for one week following discharge, then may take as needed for pain.  5. Continue methocarbamol 500mg PO every 6 hours as needed for muscle spasms.    Can discontinue methocarbamol at discharge and patient can return to using PTA tizanidine 4mg PO twice daily as needed for muscle spasms.  6. Continue lidocaine cream.  7. Continue Menthol 5% patches, 1 patch(es) transdermal every 8 hours as needed.  8. Bowel regimen per primary team to prevent opioid induced constipation.    Pain Service will Continue to follow at this time.     Thank you for consulting the Inpatient Pain Management Service. The above recommendations are to be acted upon at the primary team s discretion.     To reach us:  Mon - Friday 8 AM - 3 PM: Pager [phone] (Text Page)  After hours, weekends and holidays: Primary service should call [phone] for the on-call pain specialist    PAIN MEDICATION SAFE USE:   Prior to discharge  instruct patient on the following in addition to the medication fact sheet:    Caution: these medications can cause sedation    Take prescription medicine only if it has been prescribed by your doctor    Do not take more medicine or take it more often than instructed     Call your doctor if pain gets worse    Never mix pain medicine with alcohol, sleeping pills, or any illicit drugs    Do not operate heavy machinery, including vehicles, when initiation opioid therapy or increasing dosage    Store prescription opioids in a locked container, whenever possible     Dispose of unused opioids appropriately     Do not stop abruptly once at higher doses.  These medications must be tapered off.    Opioid pain medications do carry the risk for physical dependence and addiction and patients should be counseled about this.         1. Acute post operative mid to lower back pain s/p T10-pelvis revision fusion and removal of prior instrumentation. Patient transfer from [facility] to [facility] for closer monitoring due to lactic acidosis.    Previous multiple spine surgeries including T10-S1 fusion and L2 kyphoplasty. Last spine surgery in 2018 was c/b wound infection.  2. Bilateral lower extremity pain started the evening of 6/12/19. No numbness but describes as sharp and burning. Pain stops at knee. Per patient, surgical team aware.  3. Chronic pain of lower back, on chronic opioid management. States PTA, taking Dilaudid 4mg, 2 tablets 4x/day, every day. He states that he does not skip a dose.  4. H/o GERD  5. H/o depression    -- Outpatient opioid requirements prior to admission: Dilaudid 4mg, 2 tabs 4x/day     reviewed  -- 6/7/19 gabapentin 800mg #112 tabs for 28 days  -- 5/30/19 hydromorphone 4mg #224 tabs for 28 days    Primary Care Provider: Sarah Daly  Chronic Pain Provider: Dr. Kaylin Guzmán, [place]    Interval History:  John Olvera was seen today (June 14, 2019) and he reports that he continues to have  "pain in his low back and bilateral legs following spine revision fusion 6/10/19. He reports that the leg pain that started 6/12 has improved in his left leg, while the right leg remains unchanged. He reports the pain as a sharp, \"pinched nerve\" type of pain that shoots waist high in his back when he tries to lift his legs while lying in bed. He states that he is able to get out of bed and walk in the halls/to the bathroom, with lifting the legs being the most painful, but reports no loss of strength in his legs. Patient reports sleeping \"ok\" waking up with pain, but reports getting relief from his medications and being able to get back to sleep until they wear off in 3-4 hours. Patient reports loose stools yesterday, which he reports was baseline for him PTA. Patient was alert and oriented during our discussion today, and was optimistic about his progress and our plan outlined above moving forward.    CAPA (Clinically Aligned Pain Assessment):    Comfort (How is your pain?): Tolerable with discomfort  Change in Pain (Since your last medication/intervention?): Getting better  Pain Control (How are your pain treatments working?):  Partially effective control  Functioning (Are you able to do activities to get better?) : Can do most things, but pain gets in the way of some   Sleep (Does your pain management allow you to sleep or rest?): Awake with occasional pain      FUNCTIONAL STATUS:  Change:      Improving  Oral intake:     Regular  Activity level:     Up ambulating independently in room  Ambulating in penaloza  Mood:      Positive     -- Inpatient Medications Related to Pain Management:   Medications related to Pain Management (From now, onward)    Start     Dose/Rate Route Frequency Ordered Stop    06/13/19 1600  gabapentin (NEURONTIN) half-tab 900 mg      900 mg Oral 4 TIMES DAILY 06/13/19 1203      06/13/19 1011  HYDROmorphone (DILAUDID) tablet 6-8 mg      6-8 mg Oral EVERY 3 HOURS PRN 06/13/19 1011      06/12/19 " 1548  HYDROmorphone (DILAUDID) injection 0.2-0.4 mg      0.2-0.4 mg Intravenous EVERY 1 HOUR PRN 06/12/19 1548      06/12/19 1547  hydrOXYzine (ATARAX) tablet 50 mg      50 mg Oral EVERY 6 HOURS PRN 06/12/19 1548      06/12/19 0000  HYDROmorphone (DILAUDID) 4 MG tablet      4-6 mg Oral EVERY 3 HOURS PRN 06/12/19 0656      06/12/19 0000  methocarbamol (ROBAXIN) 500 MG tablet      500 mg Oral EVERY 6 HOURS PRN 06/12/19 0656      06/11/19 1400  acetaminophen (TYLENOL) tablet 975 mg      975 mg Oral EVERY 8 HOURS SCHEDULED 06/11/19 1158      06/11/19 1201  methocarbamol (ROBAXIN) tablet 500 mg      500 mg Oral EVERY 6 HOURS PRN 06/11/19 1201      06/11/19 0800  polyethylene glycol (MIRALAX/GLYCOLAX) Packet 17 g      17 g Oral DAILY 06/10/19 2308      06/11/19 0100  zolpidem ER (AMBIEN CR) CR tablet 12.5 mg      12.5 mg Oral AT BEDTIME 06/11/19 0013      06/11/19 0012  senna-docusate (SENOKOT-S/PERICOLACE) 8.6-50 MG per tablet 1 tablet      1 tablet Oral 4 TIMES DAILY PRN 06/11/19 0013      06/10/19 2345  busPIRone (BUSPAR) tablet 15 mg      15 mg Oral DAILY WITH SUPPER 06/10/19 2308      06/10/19 2345  senna-docusate (SENOKOT-S/PERICOLACE) 8.6-50 MG per tablet 2 tablet      2 tablet Oral 2 TIMES DAILY 06/10/19 2308      06/10/19 2315  docusate sodium (COLACE) capsule 100 mg      100 mg Oral 2 TIMES DAILY 06/10/19 2308      06/10/19 2308  diphenhydrAMINE (BENADRYL) capsule 25 mg      25 mg Oral EVERY 6 HOURS PRN 06/10/19 2308      06/10/19 2308  diphenhydrAMINE (BENADRYL) injection 25 mg      25 mg Intravenous EVERY 6 HOURS PRN 06/10/19 2308      06/10/19 2308  bisacodyl (DULCOLAX) Suppository 10 mg      10 mg Rectal DAILY PRN 06/10/19 2308      06/10/19 2308  lidocaine 1 % 0.1-1 mL      0.1-1 mL Other EVERY 1 HOUR PRN 06/10/19 2308      06/10/19 2308  lidocaine (LMX4) cream       Topical EVERY 1 HOUR PRN 06/10/19 2308            LAB DATA:  Recent Labs   Lab 06/14/19  0810 06/13/19  1157 06/11/19  0455  06/10/19  1955    CR 1.17 1.13 1.27*  --  1.23   WBC 6.7 6.3 9.3   < >  --    HGB 8.9* 7.8* 9.7*   < >  --    AST  --   --   --   --  71*   ALT  --   --   --   --  42    < > = values in this interval not displayed.         ----------------------------------------------------------------------------------  Ace Batista, Pharmacy Intern  Inpatient Pain Service     To reach us:  Mon - Friday 8 AM - 3 PM: Pager [phone] (Text Page)  After hours, weekends and holidays: Primary service should call [phone] for the on-call pain specialist    Helpful Resources:  Getting Rid of Unwanted Medications (printable PDF for patients)   Opioid Overdose Prevention Toolkit (printable PDF for patients)   Prescription Opioids: What You Need To Know (printable PDF for patients)

## 2019-06-14 NOTE — PROGRESS NOTES
"Orthopaedic Surgery  Progress Note     Subjective: No acute events overnight. Pain controlled.     Denies fever or chills, CP, SOB, numbness or tingling, motor dysfunction or weakness.     Objective:  /74 (BP Location: Left arm)   Pulse 90   Temp 99.1  F (37.3  C) (Oral)   Resp 16   Ht 1.778 m (5' 10\")   Wt 127.6 kg (281 lb 4.9 oz)   SpO2 97%   BMI 40.36 kg/m    General: NAD, alert and oriented, cooperative with exam.  Cardio: RRR, extremities wwp.   Respiratory: Non-labored breathing.  MSK: Dressing changed 6/13. c/d/i. DP and PT 2+. Blisters on the anterior shoulders.     Sensation: intact to light touch L3-S1 distribution BLE    Lower Extremity Strength Left Right   L2-3: Hip flexion 5/5 5/5   L4:  Knee extension  5/5 5/5   L5:  Foot / EHL dorsiflexion 5/5 5/5   S1:  Plantarflexion  5/5 5/5       Labs:  Recent Labs   Lab 06/13/19  1157 06/11/19  0455 06/11/19  0035 06/10/19  1650 06/10/19  1344 06/10/19  1129   HGB 7.8* 9.7* 9.7* 11.3* 13.4 11.5*   WBC 6.3 9.3 9.8  --   --   --        I/O last 3 completed shifts:  In: -   Out: 910 [Urine:900; Drains:10]    All cultures:  Recent Labs   Lab 06/11/19  0036 06/11/19  0035 06/10/19  0942 06/10/19  0940   CULT No growth after 3 days No growth after 3 days Culture negative monitoring continues  Culture negative monitoring continues Culture negative monitoring continues  Culture negative monitoring continues       Assessment:  John Olvera is a 59 year old male with PMH including chronic pain, depression, pre-diabetes, history of wound infections now s/p Revision Thoracic 10-Pelvis Posterior Instrumented Fusion With O-Arm.Stealth, Lumbar 4 Pedicle Subtraction Osteotomy, Removal Of Prior Instrumentation on 6/10 with Dr. Sebastian and Dr. Orellana.     Orthopaedics Primary; Hoapitalist comanagement  Activity: Up with assist until independent. No excessive bending or twisting. No lifting >10 lbs x 6 weeks. No Marine lift for transfers  Weight bearing status: " WBAT  Pain management: No NSAIDs   Antibiotics: completed  Diet: regular  DVT prophylaxis: SCDs only. No chemical DVT ppx needed.  Imaging: done  Bracing/Splinting: None.  Dressings: Keep Aquacel c/d/i x 7 days.  Drains: Hemovac and STAN drains removed on 6/13  Leon catheter: out  Physical Therapy/Occupational Therapy: Eval and treat.  Cultures: Pending, follow culture results closely.  Aerobic and anaerobic  Consults: Hospitalist, Pain management    Follow-up: Clinic with Dr. Sebastian in 6 weeks with repeat x-rays    Disposition: Pending progress with therapies, pain control on orals, and medical stability, anticipate discharge to home vs TCU    Henrry Holguin MD  Orthopaedic Resident, PGY-4  Pager: [phone]

## 2019-06-14 NOTE — PLAN OF CARE
"      VS:   /65 (BP Location: Left arm)   Pulse 90   Temp 97.7  F (36.5  C) (Axillary)   Resp 16   Ht 1.778 m (5' 10\")   Wt 127.6 kg (281 lb 4.9 oz)   SpO2 93%   BMI 40.36 kg/m     Output:   Patient voiding independently    Lungs Lung sounds clear, pt denies SOB   Activity:   Patient is able to be up with walker SBAo1, needs assist for transfer back into bed    Skin: Patient has aquacel to back incision, is clean/dry/intact  Abrasion to left chest is covered with tegaderm and healing   Pain:   Patient has ongoing pain to back and into front of right leg, pain has been managed with PRN dilaudid q3 hours  Pt reported some acid reflux as well- PRN tums given x1   Neuro/CMS:   Pt A/Ox4, denies numbness/tingling   Diet:   Patient is regular diet   LDA:   PIV was painful/red when tried to flush, removed   Plan:   Patient wants to go to TCU prior to discharge home, placement still pending       "

## 2019-06-15 ENCOUNTER — APPOINTMENT (OUTPATIENT)
Dept: PHYSICAL THERAPY | Facility: CLINIC | Age: 59
DRG: 457 | End: 2019-06-15
Attending: ORTHOPAEDIC SURGERY
Payer: OTHER MISCELLANEOUS

## 2019-06-15 LAB
BACTERIA SPEC CULT: NO GROWTH
BACTERIA SPEC CULT: NO GROWTH
HGB BLD-MCNC: 8.2 G/DL (ref 13.3–17.7)
SPECIMEN SOURCE: NORMAL
SPECIMEN SOURCE: NORMAL

## 2019-06-15 PROCEDURE — 99232 SBSQ HOSP IP/OBS MODERATE 35: CPT | Performed by: INTERNAL MEDICINE

## 2019-06-15 PROCEDURE — 97530 THERAPEUTIC ACTIVITIES: CPT | Mod: GP | Performed by: PHYSICAL THERAPIST

## 2019-06-15 PROCEDURE — 25000132 ZZH RX MED GY IP 250 OP 250 PS 637: Performed by: NURSE PRACTITIONER

## 2019-06-15 PROCEDURE — 25000132 ZZH RX MED GY IP 250 OP 250 PS 637: Performed by: INTERNAL MEDICINE

## 2019-06-15 PROCEDURE — 12000001 ZZH R&B MED SURG/OB UMMC

## 2019-06-15 PROCEDURE — 36415 COLL VENOUS BLD VENIPUNCTURE: CPT | Performed by: INTERNAL MEDICINE

## 2019-06-15 PROCEDURE — 25000132 ZZH RX MED GY IP 250 OP 250 PS 637: Performed by: PHYSICIAN ASSISTANT

## 2019-06-15 PROCEDURE — 97110 THERAPEUTIC EXERCISES: CPT | Mod: GP | Performed by: PHYSICAL THERAPIST

## 2019-06-15 PROCEDURE — 85018 HEMOGLOBIN: CPT | Performed by: INTERNAL MEDICINE

## 2019-06-15 PROCEDURE — 97116 GAIT TRAINING THERAPY: CPT | Mod: GP | Performed by: PHYSICAL THERAPIST

## 2019-06-15 RX ADMIN — Medication 900 MG: at 16:37

## 2019-06-15 RX ADMIN — Medication 900 MG: at 08:48

## 2019-06-15 RX ADMIN — TRAZODONE HYDROCHLORIDE 150 MG: 100 TABLET ORAL at 22:29

## 2019-06-15 RX ADMIN — HYDROMORPHONE HYDROCHLORIDE 8 MG: 2 TABLET ORAL at 19:37

## 2019-06-15 RX ADMIN — HYDROMORPHONE HYDROCHLORIDE 8 MG: 2 TABLET ORAL at 08:53

## 2019-06-15 RX ADMIN — ACETAMINOPHEN 975 MG: 325 TABLET, FILM COATED ORAL at 18:05

## 2019-06-15 RX ADMIN — METHOCARBAMOL 500 MG: 500 TABLET, FILM COATED ORAL at 10:04

## 2019-06-15 RX ADMIN — SENNOSIDES AND DOCUSATE SODIUM 1 TABLET: 8.6; 5 TABLET ORAL at 08:48

## 2019-06-15 RX ADMIN — SENNOSIDES AND DOCUSATE SODIUM 2 TABLET: 8.6; 5 TABLET ORAL at 19:37

## 2019-06-15 RX ADMIN — HYDROMORPHONE HYDROCHLORIDE 8 MG: 2 TABLET ORAL at 16:37

## 2019-06-15 RX ADMIN — ACETAMINOPHEN 975 MG: 325 TABLET, FILM COATED ORAL at 01:52

## 2019-06-15 RX ADMIN — ESCITALOPRAM OXALATE 20 MG: 20 TABLET ORAL at 08:49

## 2019-06-15 RX ADMIN — Medication 900 MG: at 19:37

## 2019-06-15 RX ADMIN — HYDROMORPHONE HYDROCHLORIDE 8 MG: 2 TABLET ORAL at 01:52

## 2019-06-15 RX ADMIN — HYDROMORPHONE HYDROCHLORIDE 8 MG: 2 TABLET ORAL at 22:30

## 2019-06-15 RX ADMIN — ZOLPIDEM TARTRATE 12.5 MG: 6.25 TABLET, EXTENDED RELEASE ORAL at 22:30

## 2019-06-15 RX ADMIN — OMEPRAZOLE 20 MG: 20 CAPSULE, DELAYED RELEASE ORAL at 08:47

## 2019-06-15 RX ADMIN — Medication 900 MG: at 12:26

## 2019-06-15 RX ADMIN — BUSPIRONE HYDROCHLORIDE 15 MG: 7.5 TABLET ORAL at 16:37

## 2019-06-15 RX ADMIN — HYDROMORPHONE HYDROCHLORIDE 8 MG: 2 TABLET ORAL at 12:26

## 2019-06-15 RX ADMIN — RANITIDINE 150 MG: 150 TABLET ORAL at 08:47

## 2019-06-15 RX ADMIN — PROMETHAZINE HYDROCHLORIDE 25 MG: 25 TABLET ORAL at 08:48

## 2019-06-15 RX ADMIN — ACETAMINOPHEN 975 MG: 325 TABLET, FILM COATED ORAL at 10:04

## 2019-06-15 RX ADMIN — OMEPRAZOLE 40 MG: 20 CAPSULE, DELAYED RELEASE ORAL at 16:37

## 2019-06-15 ASSESSMENT — ACTIVITIES OF DAILY LIVING (ADL)
ADLS_ACUITY_SCORE: 11

## 2019-06-15 NOTE — PROGRESS NOTES
Social Work Services Progress Note    Hospital Day: 6  Date of Initial Social Work Evaluation:  6/14/19   Collaborated with:  [facility] RN supervisor ([phone]).     Data:  SW informed that John is medically ready to discharge to TCU today or tomorrow.     Intervention:  SW called [facility] to check if they received insurance auth.   Updated John.     Assessment:  Per RN supervisor at [facility]; they are unable to get workers comp insurance auth over the weekend.    John reports that he's not surprised and was expecting to be here over the weekend.    Plan:    Anticipated Disposition:  Facility:  [facility]    Barriers to d/c plan:  Workers Comp insurance auth.     Follow Up:  Saturday SW will update weekday SW. Available as needed.    SARA Peraza, MSW  Saturday only pager: [phone]

## 2019-06-15 NOTE — PLAN OF CARE
"      VS:   /73 (BP Location: Left arm)   Pulse 90   Temp 99.4  F (37.4  C) (Oral)   Resp 16   Ht 1.778 m (5' 10\")   Wt 127.6 kg (281 lb 4.9 oz)   SpO2 96%   BMI 40.36 kg/m    RA. Denies chest pain.    Output:   Voiding without difficulty, loose BM today.    Activity:   Independent with walker   Skin: Intact ex for blister on chest and incision.    Pain:   Managed well with Dilaudid 8 mg.    Neuro/CMS:   Intact denies n/t    Dressing(s):   C/d/i   Diet:   Regular tolerating well    LDA:   None    Equipment:   Walker   Plan:   Continue to monitor. Pt is able to make needs known, call light is within reach. Plan is to discontinue to TCU.    Additional Info:          "

## 2019-06-15 NOTE — PROGRESS NOTES
"Orthopaedic Surgery  Progress Note     Subjective: No acute events overnight. Pain controlled.     Denies fever or chills, CP, SOB, numbness or tingling, motor dysfunction or weakness.     Objective:  /75 (BP Location: Left arm)   Pulse 90   Temp 97.7  F (36.5  C) (Oral)   Resp 16   Ht 1.778 m (5' 10\")   Wt 127.6 kg (281 lb 4.9 oz)   SpO2 96%   BMI 40.36 kg/m    General: NAD, alert and oriented, cooperative with exam.  Cardio: RRR, extremities wwp.   Respiratory: Non-labored breathing.  MSK: Dressing changed 6/13. c/d/i. DP and PT 2+. Blisters on the anterior shoulders.     Sensation: intact to light touch L3-S1 distribution BLE    Lower Extremity Strength Left Right   L2-3: Hip flexion 5/5 5/5   L4:  Knee extension  5/5 5/5   L5:  Foot / EHL dorsiflexion 5/5 5/5   S1:  Plantarflexion  5/5 5/5       Labs:  Recent Labs   Lab 06/14/19  0810 06/13/19  1157 06/11/19  0455 06/11/19  0035 06/10/19  1650 06/10/19  1344   HGB 8.9* 7.8* 9.7* 9.7* 11.3* 13.4   WBC 6.7 6.3 9.3 9.8  --   --        I/O last 3 completed shifts:  In: -   Out: 550 [Urine:550]    All cultures:  Recent Labs   Lab 06/11/19  0036 06/11/19  0035 06/10/19  0942 06/10/19  0940   CULT No growth after 4 days No growth after 4 days Culture negative monitoring continues  Culture negative monitoring continues Culture negative monitoring continues  Culture negative monitoring continues       Assessment:  John Olvera is a 59 year old male with PMH including chronic pain, depression, pre-diabetes, history of wound infections now s/p Revision Thoracic 10-Pelvis Posterior Instrumented Fusion With O-Arm.Stealth, Lumbar 4 Pedicle Subtraction Osteotomy, Removal Of Prior Instrumentation on 6/10 with Dr. Sebastian and Dr. Orellana.     Orthopaedics Primary; Hoapitalist comanagement  Activity: Up with assist until independent. No excessive bending or twisting. No lifting >10 lbs x 6 weeks. No Marine lift for transfers  Weight bearing status: WBAT  Pain " management: No NSAIDs   Antibiotics: completed  Diet: regular  DVT prophylaxis: SCDs only. No chemical DVT ppx needed.  Imaging: done  Bracing/Splinting: None.  Dressings: Keep Aquacel c/d/i x 7 days.  Drains: Hemovac and STAN drains removed on 6/13  Leon catheter: out  Physical Therapy/Occupational Therapy: Eval and treat.  Cultures: Pending, follow culture results closely.  Aerobic and anaerobic  Consults: Hospitalist, Pain management    Follow-up: Clinic with Dr. Sebastian in 6 weeks with repeat x-rays    Disposition: Pending progress with therapies, pain control on orals, and medical stability, anticipate discharge to TCU when available     Henrry Holguin MD  Orthopaedic Resident, PGY-4  Pager: [phone]

## 2019-06-15 NOTE — PLAN OF CARE
"Vitals: /73 (BP Location: Left arm)   Pulse 90   Temp 99.4  F (37.4  C) (Oral)   Resp 16   Ht 1.778 m (5' 10\")   Wt 127.6 kg (281 lb 4.9 oz)   SpO2 96%   BMI 40.36 kg/m    A&O x 4.  Lung sounds clear. Denies CP, SOB.   Output: Voiding spontaneously without difficulty.  Last BM: 6/14   Activity: Ind with walker   Skin: Intact ex incision and blisters    Pain: Incisional - managed with PRN dilaudid 8mg   CMS/Neuro: Intact.   Dressing: CDI - new dressing applied to incision by ortho  Tegaderm placed on R shoulder. Blisters looking more moist this am.   Diet: Regular   LDA: None   Equipment:    Plan/Add'l info: Able to make needs known. Call light within reach. Continue with POC  Discharge plan: TCU next week         "

## 2019-06-15 NOTE — PLAN OF CARE
VS:   VSS. LS clear. O2 sats upper 90's on RA. Denies CP/SOB   Output:   Voiding + w/out difficulty. BS+/flatus+. LBM 6/14/2019   Activity:   Ind   Skin: Popped blisters in bilat armpits from positioning during surgery. Bacitracin applied and covered in gauze.    Pain:   Pain managed w/8 mg dilaudid q 3hrs and robaxin admin x1. Pain in back and R leg   Neuro/CMS:   A&Ox4. Cms+   Dressing(s):   New aquacel placed by ortho this am c/d/i   Diet:   Tolerating red diet no n/v   LDA:   No IV access   Equipment:      Plan:   Discharge to TCU, awaiting ok from workers comp   Additional Info:

## 2019-06-15 NOTE — PLAN OF CARE
Discharge Planner PT   Patient plan for discharge: TCU  Current status: Pt is able to completed rolling and sidelying to sitting at EOB with SBA x 1.  Sit to/from standing from EOB with FWW and SBA x 1.  Pt ambulated 300' with FWW and SBA x 1.  Pt tolerated LE strengthening exercises well, but did have some increased pain.  Pt needed moderate assist with lifting LEs into bed with sitting to supine.    Barriers to return to prior living situation: Level of assist, lives alone, and stairs.   Recommendations for discharge: TCU  Rationale for recommendations: Pt would benefit from further skilled PT for LE strengthening and increase independence with all functional mobility/gait.        Entered by: Swetha Mora 06/15/2019 12:21 PM

## 2019-06-15 NOTE — PROGRESS NOTES
"[facility] Internal Medicine Progress Note            Interval History:   Record reviewed.  Seen with RN.  S/p Revision Thoracic 10-Pelvis Posterior Instrumented Fusion With O-Arm.Stealth, Lumbar 4 Pedicle Subtraction Osteotomy, Removal Of Prior Instrumentation on 6/10 with Dr. Sebastian and Dr. Orellana.  EBL 2200 ml's.  Gradual improvement.  Persistent but decrease aching discomfort RLE.  Up to BR and room without LH.  No CP, SOB, cough, nausea.  Ongoing reflux despite ranitidine.  No  abd pain.  BM LN.    Voiding OK.  Appetite improved.  Taking Boost.           Medications:   All medications reviewed today          Physical Exam:   Blood pressure 129/65, pulse 90, temperature 98.7  F (37.1  C), temperature source Oral, resp. rate 16, height 1.778 m (5' 10\"), weight 127.6 kg (281 lb 4.9 oz), SpO2 96 %.    Intake/Output Summary (Last 24 hours) at 6/12/2019 1354  Last data filed at 6/12/2019 0838  Gross per 24 hour   Intake 240 ml   Output 3330 ml   Net -3090 ml       General:  Alert.  Appropriate.  No distress.  Off O2.     Heent:      Neck:    Skin:    Chest:  clear    Cardiac:  Reg without gallop, murmur.  No JVD.     Abdomen:  Non distended, soft, non-tender.  BS normal.     Extremities:  Perfused.  No edema, calf, posterior thigh tenderness to suggest DVT.     Neuro:            Data:     Results for orders placed or performed during the hospital encounter of 06/10/19 (from the past 24 hour(s))   Hemoglobin   Result Value Ref Range    Hemoglobin 8.2 (L) 13.3 - 17.7 g/dL     Hemoglobin   Date Value Ref Range Status   06/15/2019 8.2 (L) 13.3 - 17.7 g/dL Final   06/14/2019 8.9 (L) 13.3 - 17.7 g/dL Final     Last Comprehensive Metabolic Panel:  Sodium   Date Value Ref Range Status   06/14/2019 139 133 - 144 mmol/L Final     Potassium   Date Value Ref Range Status   06/14/2019 3.5 3.4 - 5.3 mmol/L Final     Chloride   Date Value Ref Range Status   06/14/2019 105 94 - 109 mmol/L Final     Carbon Dioxide   Date " Value Ref Range Status   06/14/2019 27 20 - 32 mmol/L Final     Anion Gap   Date Value Ref Range Status   06/14/2019 7 3 - 14 mmol/L Final     Glucose   Date Value Ref Range Status   06/14/2019 128 (H) 70 - 99 mg/dL Final     Urea Nitrogen   Date Value Ref Range Status   06/14/2019 9 7 - 30 mg/dL Final     Creatinine   Date Value Ref Range Status   06/14/2019 1.17 0.66 - 1.25 mg/dL Final     GFR Estimate   Date Value Ref Range Status   06/14/2019 68 >60 mL/min/[1.73_m2] Final     Comment:     Non  GFR Calc  Starting 12/18/2018, serum creatinine based estimated GFR (eGFR) will be   calculated using the Chronic Kidney Disease Epidemiology Collaboration   (CKD-EPI) equation.       Calcium   Date Value Ref Range Status   06/14/2019 7.6 (L) 8.5 - 10.1 mg/dL Final                Assessment and Plan:   1)  S/P Revision Thoracic 10-Pelvis Posterior Instrumented Fusion With O-Arm.Stealth, Lumbar 4 Pedicle Subtraction Osteotomy, Removal Of Prior Instrumentation on 6/10 with Dr. Sebastian and Dr. Orellana.  EBL 2200 ml's.  Adequate pain control.  Mobilizing. Decrease bilateral LE pain.   Already on high dose gabapentin.    2)  Lactic acidosis normalized.  Likely related to decrease volume, hypoperfusion.  3)  Acute blood loss anemia.  Adequate. Relatively stable.    4)  GERD not controlled on present meds.   5)  CKD, stable.   6)  Anxiety, depression appears compensated.   7)  Thrombocytopenia c/w consumption.   Normalized.     PLAN:  1)  Working on TCU.  Need WC insurance authorization.   2)  Same opiates, gabapentin, IS, bowel meds, mobilize, mechanical DVT prophylaxis.  3)  Omeprazole BID.   Boost.  4)  Trend labs.  Monitor clinically.   Disposition Plan   Expected discharge in approx 2 days to TCU once insurance authorization in place.      Entered: Miguel Walters 06/15/2019, 9:56 AM              Attestation:  I have reviewed today's vital signs, notes, medications, labs and imaging.     Miguel RESENDIZ  MD [place]

## 2019-06-16 ENCOUNTER — APPOINTMENT (OUTPATIENT)
Dept: OCCUPATIONAL THERAPY | Facility: CLINIC | Age: 59
DRG: 457 | End: 2019-06-16
Attending: ORTHOPAEDIC SURGERY
Payer: OTHER MISCELLANEOUS

## 2019-06-16 ENCOUNTER — APPOINTMENT (OUTPATIENT)
Dept: PHYSICAL THERAPY | Facility: CLINIC | Age: 59
DRG: 457 | End: 2019-06-16
Attending: ORTHOPAEDIC SURGERY
Payer: OTHER MISCELLANEOUS

## 2019-06-16 LAB — HGB BLD-MCNC: 8.2 G/DL (ref 13.3–17.7)

## 2019-06-16 PROCEDURE — 97535 SELF CARE MNGMENT TRAINING: CPT | Mod: GO

## 2019-06-16 PROCEDURE — 25000132 ZZH RX MED GY IP 250 OP 250 PS 637: Performed by: NURSE PRACTITIONER

## 2019-06-16 PROCEDURE — 25000132 ZZH RX MED GY IP 250 OP 250 PS 637: Performed by: INTERNAL MEDICINE

## 2019-06-16 PROCEDURE — 25000132 ZZH RX MED GY IP 250 OP 250 PS 637: Performed by: PHYSICIAN ASSISTANT

## 2019-06-16 PROCEDURE — 12000001 ZZH R&B MED SURG/OB UMMC

## 2019-06-16 PROCEDURE — 97530 THERAPEUTIC ACTIVITIES: CPT | Mod: GO

## 2019-06-16 PROCEDURE — 36415 COLL VENOUS BLD VENIPUNCTURE: CPT | Performed by: INTERNAL MEDICINE

## 2019-06-16 PROCEDURE — 85018 HEMOGLOBIN: CPT | Performed by: INTERNAL MEDICINE

## 2019-06-16 PROCEDURE — 97116 GAIT TRAINING THERAPY: CPT | Mod: GP | Performed by: PHYSICAL THERAPIST

## 2019-06-16 PROCEDURE — 97530 THERAPEUTIC ACTIVITIES: CPT | Mod: GP | Performed by: PHYSICAL THERAPIST

## 2019-06-16 PROCEDURE — 97110 THERAPEUTIC EXERCISES: CPT | Mod: GP | Performed by: PHYSICAL THERAPIST

## 2019-06-16 PROCEDURE — 99232 SBSQ HOSP IP/OBS MODERATE 35: CPT | Performed by: INTERNAL MEDICINE

## 2019-06-16 RX ADMIN — SENNOSIDES AND DOCUSATE SODIUM 1 TABLET: 8.6; 5 TABLET ORAL at 19:44

## 2019-06-16 RX ADMIN — BUSPIRONE HYDROCHLORIDE 15 MG: 7.5 TABLET ORAL at 16:15

## 2019-06-16 RX ADMIN — HYDROMORPHONE HYDROCHLORIDE 8 MG: 2 TABLET ORAL at 09:15

## 2019-06-16 RX ADMIN — HYDROMORPHONE HYDROCHLORIDE 8 MG: 2 TABLET ORAL at 22:41

## 2019-06-16 RX ADMIN — ESCITALOPRAM OXALATE 20 MG: 20 TABLET ORAL at 10:29

## 2019-06-16 RX ADMIN — MENTHOL 1 PATCH: 205.5 PATCH TOPICAL at 16:13

## 2019-06-16 RX ADMIN — ACETAMINOPHEN 975 MG: 325 TABLET, FILM COATED ORAL at 19:43

## 2019-06-16 RX ADMIN — TRAZODONE HYDROCHLORIDE 150 MG: 100 TABLET ORAL at 22:41

## 2019-06-16 RX ADMIN — HYDROMORPHONE HYDROCHLORIDE 8 MG: 2 TABLET ORAL at 13:12

## 2019-06-16 RX ADMIN — ACETAMINOPHEN 975 MG: 325 TABLET, FILM COATED ORAL at 01:52

## 2019-06-16 RX ADMIN — OMEPRAZOLE 40 MG: 20 CAPSULE, DELAYED RELEASE ORAL at 16:15

## 2019-06-16 RX ADMIN — Medication 900 MG: at 19:44

## 2019-06-16 RX ADMIN — Medication 900 MG: at 13:12

## 2019-06-16 RX ADMIN — Medication 900 MG: at 10:27

## 2019-06-16 RX ADMIN — HYDROMORPHONE HYDROCHLORIDE 8 MG: 2 TABLET ORAL at 19:43

## 2019-06-16 RX ADMIN — ZOLPIDEM TARTRATE 12.5 MG: 6.25 TABLET, EXTENDED RELEASE ORAL at 22:41

## 2019-06-16 RX ADMIN — HYDROMORPHONE HYDROCHLORIDE 8 MG: 2 TABLET ORAL at 01:52

## 2019-06-16 RX ADMIN — SENNOSIDES AND DOCUSATE SODIUM 1 TABLET: 8.6; 5 TABLET ORAL at 13:17

## 2019-06-16 RX ADMIN — HYDROMORPHONE HYDROCHLORIDE 8 MG: 2 TABLET ORAL at 04:55

## 2019-06-16 RX ADMIN — HYDROMORPHONE HYDROCHLORIDE 8 MG: 2 TABLET ORAL at 16:15

## 2019-06-16 RX ADMIN — ACETAMINOPHEN 975 MG: 325 TABLET, FILM COATED ORAL at 10:29

## 2019-06-16 RX ADMIN — SENNOSIDES AND DOCUSATE SODIUM 2 TABLET: 8.6; 5 TABLET ORAL at 10:30

## 2019-06-16 RX ADMIN — PROMETHAZINE HYDROCHLORIDE 25 MG: 25 TABLET ORAL at 10:29

## 2019-06-16 RX ADMIN — Medication 900 MG: at 16:15

## 2019-06-16 RX ADMIN — METHOCARBAMOL 500 MG: 500 TABLET, FILM COATED ORAL at 13:17

## 2019-06-16 RX ADMIN — OMEPRAZOLE 40 MG: 20 CAPSULE, DELAYED RELEASE ORAL at 10:30

## 2019-06-16 ASSESSMENT — ACTIVITIES OF DAILY LIVING (ADL)
ADLS_ACUITY_SCORE: 11

## 2019-06-16 NOTE — PLAN OF CARE
"Discharge Planner OT   Patient plan for discharge: Home \"I don't think I need rehab anymore, what are they going to do for me there that I can't do at home?\"  Current status: Patient is making good progress in therapy. Mod I using log roll for bed mobility. Patient ambulated in hallway to/from shower room using FWW with mod I. Patient completed tub transfer with SBA and grab bar as simulated for home envt. Patient declined concerns with toilet task, showering, dressing or g/h. Has DME in place from previous surgeries.   Barriers to return to prior living situation: none anticipated  Recommendations for discharge: home   Rationale for recommendations: patient is doing well from OT standpoint, is completing basic transfers and ADLs mod I. Patient is familiar with spine precautions and rehab process as this is his 6th fusion. Has DME in place.        Entered by: BECKY AKERS 06/16/2019 2:41 PM       Occupational Therapy Discharge Summary    Reason for therapy discharge:    All goals and outcomes met, no further needs identified.    Progress towards therapy goal(s). See goals on Care Plan in [facility] electronic health record for goal details.  Goals met    Therapy recommendation(s):    No further therapy is recommended.        "

## 2019-06-16 NOTE — PLAN OF CARE
Discharge Planner PT   Patient plan for discharge: Home  Current status: Pt demonstrated supine to/from sitting with mod I.  Sit to/from standing from EOB with FWW and mod I and then sit to/from standing from EOB without FWW independently.  Pt ambulated 270' with FWW and mod I.  Pt was able to ambulate short distances safety without FWW, SBA for 20' and 10'.  Pt ascended/descended 3 steps with 2 railings and mod I.  Pt educated on car transfer and verbalized understanding of car transfer.    Barriers to return to prior living situation: No barriers to discharge home.   Recommendations for discharge: Home  Rationale for recommendations: Pt has family who will be able to check in/assist at home.        Entered by: Swetha Mora 06/16/2019 3:26 PM       Physical Therapy Discharge Summary    Reason for therapy discharge:    All goals and outcomes met, no further needs identified.    Progress towards therapy goal(s). See goals on Care Plan in [facility] electronic health record for goal details.  Goals met    Therapy recommendation(s):    Continue home exercise program.

## 2019-06-16 NOTE — PROGRESS NOTES
"Orthopaedic Surgery  Progress Note     Subjective: No acute events overnight. Pain controlled.     Denies fever or chills, CP, SOB, numbness or tingling, motor dysfunction or weakness.     Objective:  /49 (BP Location: Right arm)   Pulse 76   Temp 98.6  F (37  C) (Oral)   Resp 12   Ht 1.778 m (5' 10\")   Wt 127.6 kg (281 lb 4.9 oz)   SpO2 98%   BMI 40.36 kg/m    General: NAD, alert and oriented, cooperative with exam.  Cardio: RRR, extremities wwp.   Respiratory: Non-labored breathing.  MSK: Dressing changed 6/13. c/d/i. DP and PT 2+. Blisters on the anterior shoulders.     Sensation: intact to light touch L3-S1 distribution BLE    Lower Extremity Strength Left Right   L2-3: Hip flexion 5/5 5/5   L4:  Knee extension  5/5 5/5   L5:  Foot / EHL dorsiflexion 5/5 5/5   S1:  Plantarflexion  5/5 5/5       Labs:  Recent Labs   Lab 06/16/19  0645 06/15/19  0617 06/14/19  0810 06/13/19  1157 06/11/19  0455 06/11/19  0035   HGB 8.2* 8.2* 8.9* 7.8* 9.7* 9.7*   WBC  --   --  6.7 6.3 9.3 9.8       I/O last 3 completed shifts:  In: 960 [P.O.:960]  Out: -     All cultures:  Recent Labs   Lab 06/11/19  0036 06/11/19  0035 06/10/19  0942 06/10/19  0940   CULT No growth after 5 days No growth after 5 days No growth  Culture negative monitoring continues No growth  Culture negative monitoring continues       Assessment:  John Olvera is a 59 year old male with PMH including chronic pain, depression, pre-diabetes, history of wound infections now s/p Revision Thoracic 10-Pelvis Posterior Instrumented Fusion With O-Arm.Stealth, Lumbar 4 Pedicle Subtraction Osteotomy, Removal Of Prior Instrumentation on 6/10 with Dr. Sebastian and Dr. Orellana.     Orthopaedics Primary; Hoapitalist comanagement  Activity: Up with assist until independent. No excessive bending or twisting. No lifting >10 lbs x 6 weeks. No Marine lift for transfers  Weight bearing status: WBAT  Pain management: No NSAIDs   Antibiotics: completed  Diet: " regular  DVT prophylaxis: SCDs only. No chemical DVT ppx needed.  Imaging: done  Bracing/Splinting: None.  Dressings: Keep Aquacel c/d/i x 7 days.  Drains: Hemovac and STAN drains removed on 6/13  Leon catheter: out  Physical Therapy/Occupational Therapy: Eval and treat.  Cultures: Pending, follow culture results closely.  Aerobic and anaerobic  Consults: Hospitalist, Pain management    Follow-up: Clinic with Dr. Sebastian in 6 weeks with repeat x-rays    Disposition: Pending progress with therapies, pain control on orals, and medical stability, anticipate discharge to TCU when available, likely early this week    Henrry Holguin MD  Orthopaedic Resident, PGY-4  Pager: [phone]

## 2019-06-16 NOTE — PLAN OF CARE
Patient A & O x 4. Neuros and CMS intact, no numbness/tingling present except patient has some shooting pain at times from hip down his right leg. VSS and afebrile, SpO2 96% on room air (see flowsheet), MD aware. LS clear, BS + x 4, patient passing flatus and had a BM this shift. Patient urinating good amounts of clear yellow urine. Patient states pain is comfortably manageable. Pain meds given as ordered and PRN, heat packs used for comfort. Surgical site dressing is C/D/I. Patient has some blisters in bilateral arm pits and on left wrist. Triple antibiotic ointment applied. No PIV's present. Patient on a regular diet and tolerating it well. Patient up ad arley independently wit walker in room and hallway. Hgb 8.2 this AM, MD aware. Patient able to make needs known. Call light within reach. Will continue with POC.

## 2019-06-16 NOTE — PLAN OF CARE
VS: VSS; alert and oriented x4   O2: >95% on RA; denies SOB/cough    Output: Voiding spontaneously in toilet independently    Last BM: 6/14/19 per pt    Activity:   Independent in room with walker; tolerating well     Ambulated in penaloza x3 this shift   Skin: Intact w/ exception of incision and blisters in anterior axilla; MICHAEL per pt preference and antibiotic cream applied   Pain: Bilateral leg pain controlled with 8 mg dilaudid q3h   CMS:   Denies n/t    Pulses +2   Dressing: New aquacel x2 placed on previous shift; CDI   Diet: Regular; tolerating well    LDA: No IV access   Equipment: Walker   Plan: Discharge to TCU pending workmans comp insurance clearance    Additional Info: Able to make needs known. Calm and cooperative with nursing cares.

## 2019-06-16 NOTE — PLAN OF CARE
"Vitals: /49 (BP Location: Right arm)   Pulse 76   Temp 98.6  F (37  C) (Oral)   Resp 12   Ht 1.778 m (5' 10\")   Wt 127.6 kg (281 lb 4.9 oz)   SpO2 98%   BMI 40.36 kg/m    A&O x 4.  Lung sounds clear. Denies CP, SOB.   Output: Voiding spontaneously without difficulty.  Last BM: 6/14   Activity: Ind with walker   Skin: Intact ex incision and bilat axilla blisters   Pain: Bilateral leg pain - managed with PRN dilaudid 8mg   CMS/Neuro: Intact.   Dressing: CDI   Diet: Regular   LDA: None   Equipment:     Plan/Add'l info: Able to make needs known. Call light within reach. Continue with POC  Discharge plan: TCU next week               "

## 2019-06-17 LAB
BACTERIA SPEC CULT: NO GROWTH
BACTERIA SPEC CULT: NO GROWTH
BACTERIA SPEC CULT: NORMAL
BACTERIA SPEC CULT: NORMAL
Lab: NORMAL
SPECIMEN SOURCE: NORMAL

## 2019-06-17 PROCEDURE — 25000132 ZZH RX MED GY IP 250 OP 250 PS 637: Performed by: INTERNAL MEDICINE

## 2019-06-17 PROCEDURE — 25000132 ZZH RX MED GY IP 250 OP 250 PS 637: Performed by: NURSE PRACTITIONER

## 2019-06-17 PROCEDURE — 25000132 ZZH RX MED GY IP 250 OP 250 PS 637: Performed by: PHYSICIAN ASSISTANT

## 2019-06-17 PROCEDURE — 99207 ZZC NO CHARGE SIGN-OFF PS: CPT

## 2019-06-17 PROCEDURE — 99231 SBSQ HOSP IP/OBS SF/LOW 25: CPT | Performed by: INTERNAL MEDICINE

## 2019-06-17 PROCEDURE — G0463 HOSPITAL OUTPT CLINIC VISIT: HCPCS

## 2019-06-17 PROCEDURE — 12000001 ZZH R&B MED SURG/OB UMMC

## 2019-06-17 RX ORDER — AMOXICILLIN 250 MG
2 CAPSULE ORAL 2 TIMES DAILY
Qty: 20 TABLET | Refills: 0 | Status: SHIPPED | OUTPATIENT
Start: 2019-06-17

## 2019-06-17 RX ORDER — BENZOCAINE/MENTHOL 6 MG-10 MG
LOZENGE MUCOUS MEMBRANE 2 TIMES DAILY
Status: DISCONTINUED | OUTPATIENT
Start: 2019-06-17 | End: 2019-06-18 | Stop reason: HOSPADM

## 2019-06-17 RX ADMIN — Medication 900 MG: at 17:11

## 2019-06-17 RX ADMIN — HYDROCORTISONE: 1 CREAM TOPICAL at 17:11

## 2019-06-17 RX ADMIN — TRAZODONE HYDROCHLORIDE 150 MG: 100 TABLET ORAL at 22:40

## 2019-06-17 RX ADMIN — PROMETHAZINE HYDROCHLORIDE 25 MG: 25 TABLET ORAL at 07:51

## 2019-06-17 RX ADMIN — HYDROMORPHONE HYDROCHLORIDE 8 MG: 2 TABLET ORAL at 01:49

## 2019-06-17 RX ADMIN — OMEPRAZOLE 40 MG: 20 CAPSULE, DELAYED RELEASE ORAL at 17:10

## 2019-06-17 RX ADMIN — ACETAMINOPHEN 975 MG: 325 TABLET, FILM COATED ORAL at 17:11

## 2019-06-17 RX ADMIN — ESCITALOPRAM OXALATE 20 MG: 20 TABLET ORAL at 07:51

## 2019-06-17 RX ADMIN — MENTHOL 1 PATCH: 205.5 PATCH TOPICAL at 00:15

## 2019-06-17 RX ADMIN — OMEPRAZOLE 40 MG: 20 CAPSULE, DELAYED RELEASE ORAL at 08:28

## 2019-06-17 RX ADMIN — HYDROMORPHONE HYDROCHLORIDE 8 MG: 2 TABLET ORAL at 20:30

## 2019-06-17 RX ADMIN — Medication 900 MG: at 11:27

## 2019-06-17 RX ADMIN — HYDROMORPHONE HYDROCHLORIDE 8 MG: 2 TABLET ORAL at 17:10

## 2019-06-17 RX ADMIN — SENNOSIDES AND DOCUSATE SODIUM 2 TABLET: 8.6; 5 TABLET ORAL at 20:32

## 2019-06-17 RX ADMIN — Medication 900 MG: at 20:30

## 2019-06-17 RX ADMIN — ZOLPIDEM TARTRATE 12.5 MG: 6.25 TABLET, EXTENDED RELEASE ORAL at 22:39

## 2019-06-17 RX ADMIN — HYDROMORPHONE HYDROCHLORIDE 8 MG: 2 TABLET ORAL at 05:25

## 2019-06-17 RX ADMIN — HYDROMORPHONE HYDROCHLORIDE 8 MG: 2 TABLET ORAL at 14:27

## 2019-06-17 RX ADMIN — ACETAMINOPHEN 975 MG: 325 TABLET, FILM COATED ORAL at 10:09

## 2019-06-17 RX ADMIN — SENNOSIDES AND DOCUSATE SODIUM 1 TABLET: 8.6; 5 TABLET ORAL at 20:30

## 2019-06-17 RX ADMIN — ACETAMINOPHEN 975 MG: 325 TABLET, FILM COATED ORAL at 01:48

## 2019-06-17 RX ADMIN — HYDROMORPHONE HYDROCHLORIDE 8 MG: 2 TABLET ORAL at 08:27

## 2019-06-17 RX ADMIN — HYDROMORPHONE HYDROCHLORIDE 8 MG: 2 TABLET ORAL at 11:27

## 2019-06-17 RX ADMIN — BUSPIRONE HYDROCHLORIDE 15 MG: 7.5 TABLET ORAL at 17:11

## 2019-06-17 RX ADMIN — Medication 900 MG: at 07:51

## 2019-06-17 RX ADMIN — HYDROCORTISONE: 1 CREAM TOPICAL at 20:33

## 2019-06-17 RX ADMIN — SENNOSIDES AND DOCUSATE SODIUM 1 TABLET: 8.6; 5 TABLET ORAL at 07:52

## 2019-06-17 ASSESSMENT — ACTIVITIES OF DAILY LIVING (ADL)
ADLS_ACUITY_SCORE: 11

## 2019-06-17 NOTE — PROGRESS NOTES
[facility] Nurse Inpatient Pressure Injury Assessment   Reason for consultation: Evaluate and treat bilateral lateral chest wound      ASSESSMENT  Pressure Injury: on bilateral lateral chest, hospital acquired   Pressure Injury is Stage 2   Contributing factor of the pressure injury: pressure, shear and immobility  Status: initial assessment     TREATMENT PLAN  Bilateral lateral chest wounds: Daily : wash with wound cleanser and gauze. Coat with Aquaphor or Vaseline to keep area moist. No cover dressing indicated at this time.  Orders Written  WO Nurse follow-up plan:weekly  Nursing to notify the Provider(s) and re-consult the [facility] Nurse if wound(s) deteriorates or new skin concern.    Patient History  According to provider note(s): Per Dr Henrry Holguin on 6/17/19: John Olvera is a 59 year old male with PMH including chronic pain, depression, pre-diabetes, history of wound infections now s/p Revision Thoracic 10-Pelvis Posterior Instrumented Fusion With O-Arm.Stealth, Lumbar 4 Pedicle Subtraction Osteotomy, Removal Of Prior Instrumentation on 6/10 with Dr. Sebastian and Dr. Orellana.    Objective Data  Containment of urine/stool: Continent of bowel and Continent of bladder    Current Diet/ Nutrition:  Orders Placed This Encounter      Advance Diet as Tolerated: Regular Diet Adult      Diet      Output:   I/O last 3 completed shifts:  In: 350 [P.O.:350]  Out: -     Risk Assessment:   Sensory Perception: 4-->no impairment  Moisture: 4-->rarely moist  Activity: 3-->walks occasionally  Mobility: 3-->slightly limited  Nutrition: 3-->adequate  Friction and Shear: 3-->no apparent problem  Skyler Score: 20      Labs:   Recent Labs   Lab 06/16/19  0645  06/14/19  0810  06/10/19  1955  06/10/19  1344   ALBUMIN  --   --   --   --  2.9*  --   --    HGB 8.2*   < > 8.9*   < >  --    < > 13.4   INR  --   --   --   --   --   --  1.16*   WBC  --   --  6.7   < >  --   --   --     < > = values in this interval not displayed.       Physical  Exam  Skin inspection: focused chest    Wound Location:  Bilateral lateral chest     6/17/19 Right lateral chest                               6/17/19 Left lateral chest    Date of last Photo 6/17/19  Wound History: Patient prone in OR for 8 hours on 6/10/19. Blistering noted when patient returned to floot  Measurements (length x width x depth, in cm)Right:  2 cm x 4 cm  x  0.1 cm or dermis, Left, total area: 4 cm x 3 cm x 0 cm maroon scab  Palpation of the wound bed: normal   Periwound skin: intact  Color: pink  Temperature: normal   Drainage: none  Description of drainage: none  Odor: none  Pain: denies     Interventions  Current support surface: Standard  Atmos Air mattress  Current off-loading measures: Pillows under calves  Repositioning aid: Pillows  Visual inspection of wound(s) completed   Tube Securement: n/a  Wound Care: was done per plan of care.  Supplies: floor stock  Educated provided: importance of repositioning, plan of care and wound progress  Education provided to: patient   Discussed importance of:off-loading pressure to wound and their role in pressure injury prevention  Discussed plan of care with Patient    Jocelyn Fontenot RN, CWOCN

## 2019-06-17 NOTE — PLAN OF CARE
"      VS:   /61 (BP Location: Right arm)   Pulse 86   Temp 98.8  F (37.1  C) (Oral)   Resp 16   Ht 1.778 m (5' 10\")   Wt 127.6 kg (281 lb 4.9 oz)   SpO2 95%   BMI 40.36 kg/m       Output:   Voiding without difficulty. Passing flatus. LBM 6/16   Lungs LS clear, using IS independently   Activity:   Up indep in room and penaloza with walker.    Skin: Intact except for incision, rash inner aspect wrist area, and bilateral blisters axillary area from positioning in surgery,MICHAEL, seen by WOCN today, new orders for care. Cortaid ordered for rash on wrist.    Pain:   Incisional radiating to R thigh, managed with po dilaudid, robaxin, scheduled tylenol.    Neuro/CMS:   Intact, denies any numbness or tingling   Dressing(s):   CDI   Diet:   Tolerating reg diet, denies nausea   LDA:   No LDA's   Equipment:   PCD's, walker   Plan:   Discharge 6/18 0800 via private transport.    Additional Info:   Discharge instructions have been given and explained to pt. Xrays completed 6/11. Discharge medications are on unit in locked box and need to be given to pt on discharge tomorrow.      "

## 2019-06-17 NOTE — PROGRESS NOTES
Pt was seen, case reviewed with nursing staff, orthopedics and with Dr. Walters.    Patient feels well, expects to discharge home early tomorrow morning.    Pain is been controlled.    He has a new complaint of pruritic rash left wrist which he thinks is related to a identification bracelet which is since been removed.    Vital signs stable.  Afebrile  Lungs clear  CV RRR  Abdomen soft  Eczematous appearing rash left wrist, in a linear pattern, consistent with contact dermatitis.    Assessment    Left wrist rash, consistent with contact dermatitis    Status post extensive thoracal pelvis fusion.  Patient is doing well    Acute blood loss anemia, stable, asymptomatic.    Plan  Hydrocortisone cream to left wrist.  Patient is medically stable for discharge tomorrow morning.

## 2019-06-17 NOTE — PLAN OF CARE
VS: VSS; alert and oriented x4   O2: >95% on RA; denies SOB/cough    Output: Voiding spontaneously in toilet independently    Last BM: 6/16/19    Activity: Independent in room with walker; tolerating well   Ambulated to shower and in hallway x2   Skin: Posterior back incision and scabbed/pink blisters in bilateral anterior axilla; MICHAEL per pt preference and abx ointment applied    Pain: R leg pain- burning sensation per pt  Controlled with 8 mg prn dilaudid q3h, heat packs, and icy hot patch to R thigh    CMS: Denies n/t; pulses +2   Dressing: Aquacel x2 CDI    Diet: Regular; tolerating well    LDA: No IV access   Equipment: Walker   Plan: Discharge to home; cleared by therapy     Additional Info: Pt took shower this shift with minimal assistance; shower set up only. Tolerated well. Able to make needs known; calm and cooperative with nursing cares.

## 2019-06-17 NOTE — PROGRESS NOTES
"Orthopaedic Surgery  Progress Note     Subjective: No acute events overnight. Pain controlled.     Denies fever or chills, CP, SOB, numbness or tingling, motor dysfunction or weakness.     Objective:  /61 (BP Location: Right arm)   Pulse 86   Temp 98.8  F (37.1  C) (Oral)   Resp 16   Ht 1.778 m (5' 10\")   Wt 127.6 kg (281 lb 4.9 oz)   SpO2 95%   BMI 40.36 kg/m    General: NAD, alert and oriented, cooperative with exam.  Cardio: RRR, extremities wwp.   Respiratory: Non-labored breathing.  MSK: Dressing changed 6/13. c/d/i. DP and PT 2+. Blisters on the anterior shoulders.     Sensation: intact to light touch L3-S1 distribution BLE    Lower Extremity Strength Left Right   L2-3: Hip flexion 5/5 5/5   L4:  Knee extension  5/5 5/5   L5:  Foot / EHL dorsiflexion 5/5 5/5   S1:  Plantarflexion  5/5 5/5       Labs:  Recent Labs   Lab 06/16/19  0645 06/15/19  0617 06/14/19  0810 06/13/19  1157 06/11/19  0455 06/11/19  0035   HGB 8.2* 8.2* 8.9* 7.8* 9.7* 9.7*   WBC  --   --  6.7 6.3 9.3 9.8       I/O last 3 completed shifts:  In: 350 [P.O.:350]  Out: -     All cultures:  Recent Labs   Lab 06/11/19  0036 06/11/19  0035 06/10/19  0942 06/10/19  0940   CULT No growth after 5 days No growth after 5 days No growth  Culture negative monitoring continues No growth  Culture negative monitoring continues       Assessment:  John Olvera is a 59 year old male with PMH including chronic pain, depression, pre-diabetes, history of wound infections now s/p Revision Thoracic 10-Pelvis Posterior Instrumented Fusion With O-Arm.Stealth, Lumbar 4 Pedicle Subtraction Osteotomy, Removal Of Prior Instrumentation on 6/10 with Dr. Sebastian and Dr. Orellana.     Orthopaedics Primary; Hoapitalist comanagement  Activity: Up with assist until independent. No excessive bending or twisting. No lifting >10 lbs x 6 weeks. No Marine lift for transfers  Weight bearing status: WBAT  Pain management: No NSAIDs   Antibiotics: completed  Diet: " regular  DVT prophylaxis: SCDs only. No chemical DVT ppx needed.  Imaging: done  Bracing/Splinting: None.  Dressings: Keep Aquacel c/d/i x 7 days.  Drains: Hemovac and STAN drains removed on 6/13  Leon catheter: out  Physical Therapy/Occupational Therapy: Eval and treat.  Cultures: Pending, follow culture results closely.  Aerobic and anaerobic  Consults: Hospitalist, Pain management    Follow-up: Clinic with Dr. Sebastian in 6 weeks with repeat x-rays    Disposition: Pending progress with therapies, pain control on orals, and medical stability, anticipate discharge to TCU when available, likely early this week    Henrry Holguin MD  Orthopaedic Resident, PGY-4  Pager: [phone]

## 2019-06-17 NOTE — PROGRESS NOTES
Care Coordinator Progress Note    Admission Date/Time:  6/10/2019  Attending MD:  Salazar Sebastian*    Data  Chart reviewed, discussed with interdisciplinary team.   Patient was admitted for: S/P spinal surgery.    Concerns with insurance coverage for discharge needs: None.  Current Living Situation: Patient lives alone.  Support System: Involved  Services Involved: NA  Transportation at Discharge: Family or friend will provide  Transportation to Medical Appointments:   - Not applicable  Barriers to Discharge: NA       Assessment  Patient and PT/OT do not indicate any need for therapies at this time. Patient to discharge to home on 6/18 with family assist. RNCC available as needed.       Plan  Anticipated Discharge Date:  6/18/2019  Anticipated Discharge Plan:  Home     Keely Christopher RN, BSN  Care Coordinator, 8A  Phone [phone]  Pager [phone]

## 2019-06-17 NOTE — PLAN OF CARE
"Vitals: /61 (BP Location: Right arm)   Pulse 86   Temp 98.8  F (37.1  C) (Oral)   Resp 16   Ht 1.778 m (5' 10\")   Wt 127.6 kg (281 lb 4.9 oz)   SpO2 95%   BMI 40.36 kg/m    A&O x 4.  Lung sounds clear. Denies CP, SOB.   Output: Voiding spontaneously without difficulty.  Last BM: 6/16   Activity: Ind with walker   Skin: Intact ex incision, bilat axilla blisters, rash on L wrist   Pain: Incisional radiating to legs - managed with PRN dilaudid 8mg, Icy hot, and repositioning   CMS/Neuro: Intact.   Dressing: CDI   Diet: Regular   LDA: None   Equipment:    Plan/Add'l info: Able to make needs known. Call light within reach. Continue with POC.  Discharge plan: home Tues.         "

## 2019-06-18 ENCOUNTER — PATIENT OUTREACH (OUTPATIENT)
Dept: CARE COORDINATION | Facility: CLINIC | Age: 59
End: 2019-06-18

## 2019-06-18 VITALS
RESPIRATION RATE: 16 BRPM | TEMPERATURE: 98.3 F | SYSTOLIC BLOOD PRESSURE: 107 MMHG | OXYGEN SATURATION: 98 % | HEART RATE: 86 BPM | HEIGHT: 70 IN | BODY MASS INDEX: 40.27 KG/M2 | DIASTOLIC BLOOD PRESSURE: 54 MMHG | WEIGHT: 281.31 LBS

## 2019-06-18 PROCEDURE — 25000132 ZZH RX MED GY IP 250 OP 250 PS 637: Performed by: PHYSICIAN ASSISTANT

## 2019-06-18 PROCEDURE — 25000132 ZZH RX MED GY IP 250 OP 250 PS 637: Performed by: NURSE PRACTITIONER

## 2019-06-18 PROCEDURE — 25000132 ZZH RX MED GY IP 250 OP 250 PS 637: Performed by: INTERNAL MEDICINE

## 2019-06-18 RX ADMIN — OMEPRAZOLE 40 MG: 20 CAPSULE, DELAYED RELEASE ORAL at 08:06

## 2019-06-18 RX ADMIN — SENNOSIDES AND DOCUSATE SODIUM 1 TABLET: 8.6; 5 TABLET ORAL at 08:05

## 2019-06-18 RX ADMIN — ESCITALOPRAM OXALATE 20 MG: 20 TABLET ORAL at 08:05

## 2019-06-18 RX ADMIN — PROMETHAZINE HYDROCHLORIDE 25 MG: 25 TABLET ORAL at 08:05

## 2019-06-18 RX ADMIN — HYDROMORPHONE HYDROCHLORIDE 8 MG: 2 TABLET ORAL at 03:48

## 2019-06-18 RX ADMIN — Medication 900 MG: at 08:04

## 2019-06-18 RX ADMIN — METHOCARBAMOL 500 MG: 500 TABLET, FILM COATED ORAL at 08:05

## 2019-06-18 RX ADMIN — HYDROMORPHONE HYDROCHLORIDE 8 MG: 2 TABLET ORAL at 07:01

## 2019-06-18 RX ADMIN — ACETAMINOPHEN 975 MG: 325 TABLET, FILM COATED ORAL at 03:49

## 2019-06-18 RX ADMIN — HYDROMORPHONE HYDROCHLORIDE 8 MG: 2 TABLET ORAL at 00:07

## 2019-06-18 ASSESSMENT — ACTIVITIES OF DAILY LIVING (ADL)
ADLS_ACUITY_SCORE: 11

## 2019-06-18 NOTE — PLAN OF CARE
A/O x 4. Resting in PO dilaudid given for pain and it is tolerable. Positioning self. Uses walker, independent. Pt is anticipating discharge this AM. CMS and neuro's intact. Will apply a primapore dressing to spine this AM. Cont to assess.

## 2019-06-18 NOTE — PLAN OF CARE
Nursing     S- Pt requested am meds and family here to give him ride home this am- 6 hr ride home.  Pt alert, awake, walking in penaloza, requested robaxin and am meds- given,  states understands discharge instructions from last evening.    Home w belongings, discharge meds, per w/chair ride to car w sister  A- ready for discharge to home

## 2019-06-18 NOTE — PROGRESS NOTES
"Orthopaedic Surgery  Progress Note     Subjective: No acute events overnight. Pain controlled.     Denies fever or chills, CP, SOB, numbness or tingling, motor dysfunction or weakness.     Objective:  /54 (BP Location: Left arm)   Pulse 86   Temp 98.3  F (36.8  C) (Oral)   Resp 16   Ht 1.778 m (5' 10\")   Wt 127.6 kg (281 lb 4.9 oz)   SpO2 98%   BMI 40.36 kg/m    General: NAD, alert and oriented, cooperative with exam.  Cardio: RRR, extremities wwp.   Respiratory: Non-labored breathing.  MSK: Dressing changed 6/13. c/d/i. DP and PT 2+. Blisters on the anterior shoulders.     Sensation: intact to light touch L3-S1 distribution BLE    Lower Extremity Strength Left Right   L2-3: Hip flexion 5/5 5/5   L4:  Knee extension  5/5 5/5   L5:  Foot / EHL dorsiflexion 5/5 5/5   S1:  Plantarflexion  5/5 5/5       Labs:  Recent Labs   Lab 06/16/19  0645 06/15/19  0617 06/14/19  0810 06/13/19  1157   HGB 8.2* 8.2* 8.9* 7.8*   WBC  --   --  6.7 6.3       No intake/output data recorded.    All cultures:  No results for input(s): CULT in the last 168 hours.    Assessment:  John Olvera is a 59 year old male with PMH including chronic pain, depression, pre-diabetes, history of wound infections now s/p Revision Thoracic 10-Pelvis Posterior Instrumented Fusion With O-Arm.Stealth, Lumbar 4 Pedicle Subtraction Osteotomy, Removal Of Prior Instrumentation on 6/10 with Dr. Sebastian and Dr. Orellana.     Orthopaedics Primary; Hoapitalist comanagement  Activity: Up with assist until independent. No excessive bending or twisting. No lifting >10 lbs x 6 weeks. No Marine lift for transfers  Weight bearing status: WBAT  Pain management: No NSAIDs   Antibiotics: completed  Diet: regular  DVT prophylaxis: SCDs only. No chemical DVT ppx needed.  Imaging: done  Bracing/Splinting: None.  Dressings: Keep Aquacel c/d/i x 7 days.  Drains: Hemovac and STAN drains removed on 6/13  Leon catheter: out  Physical Therapy/Occupational Therapy: Ariella and " treat.  Cultures: Pending, follow culture results closely.  Aerobic and anaerobic  Consults: Hospitalist, Pain management    Follow-up: Clinic with Dr. Sebastian in 6 weeks with repeat x-rays    Disposition: Pending progress with therapies, pain control on orals, and medical stability, anticipate discharge to home today    Henrry Holguin MD  Orthopaedic Resident, PGY-4  Pager: [phone]

## 2019-06-18 NOTE — PLAN OF CARE
"      VS:   /50 (BP Location: Right arm)   Pulse 86   Temp 98.7  F (37.1  C) (Oral)   Resp 16   Ht 1.778 m (5' 10\")   Wt 127.6 kg (281 lb 4.9 oz)   SpO2 95%   BMI 40.36 kg/m    LS clear denies SOB/CP   Output:   BS+ passing flatus, BM 6/17. Voids spontaneously   Activity:   Independent in room/hallway with walker   Skin: Ex incision, axilla pressure injuries open to air, may apply Vaseline, left wrist rash ( hydrocortisone cream applied with relief   Pain:   Tolerable with PRN Dilaudid   Neuro/CMS:   Intact denies n/t   Dressing(s):   CDI reinforced at bottom with prima pore, Pt ca have dressing changed in AM before discharge   Diet:   REG   LDA:   none   Equipment:   Walker call light within reach   Plan:   discharge tomorrow morning via family to [facility]   Additional Info:         "

## 2019-06-19 NOTE — PROGRESS NOTES
[facility]: Post-Discharge Note  SITUATION                                                      Admission:    Admission Date: 06/10/19   Reason for Admission: Lumbar Stenosis and Neurogenic Claudication,Flatbak Syndrome Of Lumbar Region  Discharge:   Discharge Date: 06/18/19  Discharge Diagnosis: Lumbar Stenosis and Neurogenic Claudication,Flatbak Syndrome Of Lumbar Region  Discharge Service: Orthopedics     BACKGROUND                                                      John Olvera is a 59 year old male PMH of GERD   CKD,anxiety/depression, back surgery  C/b history of wound infections now s/p Revision Thoracic 10-Pelvis Posterior Instrumented Fusion With O-Arm.Stealth, Lumbar 4 Pedicle Subtraction Osteotomy, Removal Of Prior Instrumentation on 6/10 with Dr. Sebastian and Dr. Orellana transfered from [facility] room for transient hypotension and lactic acidosis.    ASSESSMENT      Discharge Assessment  Patient reports symptoms are: Improved  Does the patient have all of their medications?: Yes  Does patient know what their new medications are for?: Yes  Does patient have a follow-up appointment scheduled?: Yes(Patient will schedule with his PCP the first week in July to have his surtures removed. He lives 6 hours away. )  Does patient have any other questions or concerns?: No    Post-op  Did the patient have surgery or a procedure: Yes  Incision: closed;healing  Drainage: No  Bleeding: none  Fever: No  Chills: No  Redness: No  Warmth: No  Swelling: No  Incision site pain: No  Closure: suture  Eating & Drinking: eating and drinking without complaints/concerns  PO Intake: regular diet  Bowel Function: normal  Urinary Status: voiding without complaint/concerns    PLAN                                                      Outpatient Plan:  Follow up with Dr. Sebastian at 6 weeks postoperatively.    Future Appointments   Date Time Provider Department Center   7/26/2019  1:20 PM Salazar Sebastian  MD [facility]           Terrie Villarreal, CMA

## 2019-06-25 ENCOUNTER — TELEPHONE (OUTPATIENT)
Dept: ORTHOPEDICS | Facility: CLINIC | Age: 59
End: 2019-06-25

## 2019-06-25 NOTE — TELEPHONE ENCOUNTER
John underwent Revision T10-Pelvis PIF on 6/10/19.  Pt left voicemail with RN asking about wound care and suture removal.  He lives in [place] and pt was phoned back by RN and voicemail was left that if a local provider is available and willing, they can monitor and remove sutures if indicated.  We would like a report if that is done.  Pt was called a second time on both his home and mobile number and voicemail was left again for him to call back with update about his wound and postop course.  Namita Flowers RN

## 2019-06-28 ENCOUNTER — TELEPHONE (OUTPATIENT)
Dept: ORTHOPEDICS | Facility: CLINIC | Age: 59
End: 2019-06-28

## 2019-06-28 NOTE — TELEPHONE ENCOUNTER
John sent photo of his incision, it looks clean, dry, intact without drainage.  Pt was instructed that it's fine to remove top 25% of the sutures, leave the lower 3/4 sutures where there is more of a curve and waistband for another week and send photo for further instructions.  Image was sent to image specialist to enter into Epic media.  Namita Flowers RN

## 2019-07-23 ASSESSMENT — ENCOUNTER SYMPTOMS
FEVER: 0
DEPRESSION: 1
JAUNDICE: 0
NIGHT SWEATS: 1
MYALGIAS: 1
DISTURBANCES IN COORDINATION: 0
MUSCLE CRAMPS: 1
WEIGHT GAIN: 1
LOSS OF CONSCIOUSNESS: 0
DIZZINESS: 0
BACK PAIN: 1
SPEECH CHANGE: 0
WEIGHT LOSS: 0
CONSTIPATION: 0
MEMORY LOSS: 0
HEARTBURN: 0
BLOOD IN STOOL: 0
INCREASED ENERGY: 1
NUMBNESS: 0
ABDOMINAL PAIN: 0
SEIZURES: 0
TINGLING: 1
HEADACHES: 0
POLYDIPSIA: 0
NERVOUS/ANXIOUS: 1
MUSCLE WEAKNESS: 0
WEAKNESS: 0
RECTAL PAIN: 0
FATIGUE: 1
INSOMNIA: 1
TREMORS: 0
ALTERED TEMPERATURE REGULATION: 0
ARTHRALGIAS: 1
NECK PAIN: 0
POLYPHAGIA: 0
JOINT SWELLING: 0
DECREASED CONCENTRATION: 1
BOWEL INCONTINENCE: 0
DIARRHEA: 1
HALLUCINATIONS: 0
DECREASED APPETITE: 1
BLOATING: 0
NAUSEA: 0
STIFFNESS: 1
PANIC: 0
VOMITING: 0
PARALYSIS: 0
CHILLS: 0

## 2019-07-25 DIAGNOSIS — M54.16 LUMBAR RADICULOPATHY: Primary | ICD-10-CM

## 2019-07-26 ENCOUNTER — OFFICE VISIT (OUTPATIENT)
Dept: ORTHOPEDICS | Facility: CLINIC | Age: 59
End: 2019-07-26
Payer: OTHER MISCELLANEOUS

## 2019-07-26 ENCOUNTER — ANCILLARY PROCEDURE (OUTPATIENT)
Dept: GENERAL RADIOLOGY | Facility: CLINIC | Age: 59
End: 2019-07-26
Attending: ORTHOPAEDIC SURGERY
Payer: OTHER MISCELLANEOUS

## 2019-07-26 VITALS — WEIGHT: 281 LBS | HEIGHT: 70 IN | BODY MASS INDEX: 40.23 KG/M2

## 2019-07-26 DIAGNOSIS — M54.16 LUMBAR RADICULOPATHY: Primary | ICD-10-CM

## 2019-07-26 ASSESSMENT — MIFFLIN-ST. JEOR: SCORE: 2095.86

## 2019-07-26 NOTE — NURSING NOTE
"Reason For Visit:   Chief Complaint   Patient presents with     RECHECK     6 week POP        Primary MD: Sarah Daly  Ref. MD: none  Date of surgery: Dr. Sebastian   Type of surgery: Dr. Sebastian .  Smoker: No  Request smoking cessation information: No    Ht 1.778 m (5' 10\")   Wt 127.5 kg (281 lb)   BMI 40.32 kg/m           Oswestry (ANNITA) Questionnaire    OSWESTRY DISABILITY INDEX 7/23/2019   Count 9   Sum 21   Oswestry Score (%) 46.67   Some recent data might be hidden            Neck Disability Index (NDI) Questionnaire    No flowsheet data found.                Promis 10 Assessment    PROMIS 10 7/23/2019   In general, would you say your health is: Fair   In general, would you say your quality of life is: Poor   In general, how would you rate your physical health? Poor   In general, how would you rate your mental health, including your mood and your ability to think? Fair   In general, how would you rate your satisfaction with your social activities and relationships? Poor   In general, please rate how well you carry out your usual social activities and roles Poor   To what extent are you able to carry out your everyday physical activities such as walking, climbing stairs, carrying groceries, or moving a chair? A little   How often have you been bothered by emotional problems such as feeling anxious, depressed or irritable? Sometimes   How would you rate your fatigue on average? Moderate   How would you rate your pain on average?   0 = No Pain  to  10 = Worst Imaginable Pain 6   In general, would you say your health is: 2   In general, would you say your quality of life is: 1   In general, how would you rate your physical health? 1   In general, how would you rate your mental health, including your mood and your ability to think? 2   In general, how would you rate your satisfaction with your social activities and relationships? 1   In general, please rate how well you carry out your usual social activities and " roles. (This includes activities at home, at work and in your community, and responsibilities as a parent, child, spouse, employee, friend, etc.) 1   To what extent are you able to carry out your everyday physical activities such as walking, climbing stairs, carrying groceries, or moving a chair? 2   In the past 7 days, how often have you been bothered by emotional problems such as feeling anxious, depressed, or irritable? 3   In the past 7 days, how would you rate your fatigue on average? 3   In the past 7 days, how would you rate your pain on average, where 0 means no pain, and 10 means worst imaginable pain? 6   Global Mental Health Score 7   Global Physical Health Score 9   PROMIS TOTAL - SUBSCORES 16   Some recent data might be hidden                Kali Ortiz ATC

## 2019-07-26 NOTE — LETTER
7/26/2019       RE: John Olvera  [address] [place] [address]     Dear Colleague,    Thank you for referring your patient, John Olvera, to the [facility]. Please see a copy of my visit note below.    Spine Surgery Return Clinic Visit      Chief Complaint:   RECHECK (6 week POP )      Interval HPI:  Symptom Profile Including: location of symptoms, onset, severity, exacerbating/alleviating factors, previous treatments:        John Olvera is a 59 year old male who is 6 weeks s/p revision T10-pelvis fusion done with Dr. Orellana as co-surgeon on 6/10/19.  Patient reports that he is overall satisfied with surgery, and he is very pleased because he is now able to stand up straight.  He has been walking around a bit, but is still having some pain in his low back and right rib pains that were both present before surgery.  He says that these pains are slightly improved, but he thought that they would be better by now.  He is not having any new pain, numbness, tingling, or weakness in any other areas.  No other questions or concerns today.         Past Medical History:     Past Medical History:   Diagnosis Date     Anxiety      Chronic back pain      CKD (chronic kidney disease)      Class 3 severe obesity due to excess calories with serious comorbidity and body mass index (BMI) of 40.0 to 44.9 in adult (H) 10/12/2018     Depression      GERD (gastroesophageal reflux disease)      Lumbar radiculopathy 10/12/2018     Prediabetes             Past Surgical History:     Past Surgical History:   Procedure Laterality Date     BACK SURGERY  2015    laminectomy     BACK SURGERY      fixation kyphoplasty lumbar spine     BACK SURGERY  2001    spinal fusion     COLONOSCOPY       EPIDIDYMECTOMY  1998     OPTICAL TRACKING SYSTEM FUSION POSTERIOR SPINE THORACIC THREE+ LEVELS N/A 6/10/2019    Procedure: Revision Thoracic 10-Pelvis Posterior Instrumented Fusion With  "O-Arm.Stealth, Lumbar 4 Pedicle Subtraction Osteotomy, Removal Of Prior Instrumentation;  Surgeon: Salazar Sebastian MD;  Location: [facility] OR            Social History:     Social History     Tobacco Use     Smoking status: Former Smoker     Packs/day: 1.00     Years: 40.00     Pack years: 40.00     Last attempt to quit: 2019     Years since quittin.4     Smokeless tobacco: Never Used     Tobacco comment: Quit 2019   Substance Use Topics     Alcohol use: Yes     Binge frequency: Less than monthly            Family History:     Family History   Problem Relation Age of Onset     Pancreatic Cancer Father      Bladder Cancer Brother      Anesthesia Reaction Daughter         PONV     Deep Vein Thrombosis (DVT) No family hx of             Allergies:     Allergies   Allergen Reactions     Diagnostic X-Ray Materials Nausea and Vomiting     Patient vomits every time he has the contrast. Has tried medication for nausea and it did not work     Silver Nitrate Rash            Medications:     Current Outpatient Medications   Medication     BusPIRone HCl (BUSPAR PO)     Escitalopram Oxalate (LEXAPRO PO)     gabapentin (NEURONTIN) 800 MG tablet     HYDROmorphone (DILAUDID) 4 MG tablet     HYDROmorphone HCl (DILAUDID PO)     methocarbamol (ROBAXIN) 500 MG tablet     OMEPRAZOLE PO     promethazine (PHENERGAN) 25 MG tablet     senna-docusate (SENOKOT-S/PERICOLACE) 8.6-50 MG tablet     senna-docusate (SENOKOT-S;PERICOLACE) 8.6-50 MG per tablet     tiZANidine (ZANAFLEX) 4 MG tablet     TRAZODONE HCL PO     VITAMIN D, CHOLECALCIFEROL, PO     Zolpidem Tartrate (AMBIEN CR PO)     No current facility-administered medications for this visit.              Review of Systems:   A focused musculoskeletal and neurologic ROS was performed with pertinent positives and negatives noted in the HPI.  Additional systems were also reviewed and are documented at the bottom of the note.         Physical Exam:   Vitals: Ht 1.778 m (5' 10\")  "  Wt 127.5 kg (281 lb)   BMI 40.32 kg/m     PHYSICAL EXAM:   Constitutional - Patient is healthy, well-nourished and appears stated age. Patient is obese.   Respiratory - Patient is breathing normally and in no respiratory distress.   Skin - No suspicious rashes or lesions.   Psychiatric - Normal mood and affect.   Cardiovascular - Extremities warm and well perfused.   Eyes - Visual acuity is normal to the written word.   ENT - Hearing intact to the spoken word   GI - No abdominal distention.   Musculoskeletal - Non-antalgic gait with use of cane.        Thoracic Spine:    Appearance - Normal. Well healed midline surgical scar from N78-[id number] cleft    Palpation - Non-tender to palpation    Strength/ROM - deferred            Lumbar Spine:    Appearance - Normal. Well healed midline surgical scar from C22-mdstwis cleft; small pencil-eraser sized scab at the bottom of scar without surrounding erythma, drainage, area of fluctuance, warmth, or other signs of infection.     Palpation - Non-tender to palpation midline or paraspinal muscles.    ROM - Full     Motor -        LOWER EXTREMITY Left Right   Hip flexion 5/5 5/5   Knee flexion 5/5 5/5   Knee extension 5/5 5/5   Ankle dorsiflexion 5/5 5/5   Ankle plantarflexion 5/5 5/5   Great toe extension 5/5 5/5        Special tests -     Straight leg raise - negative      Neurologic - Sensation intact to light touch bilaterally.    Alignment:  Patient stands straight with a neutral standing sagittal balance.         Imaging:   We ordered and independently reviewed new radiographs at this clinic visit. The results were discussed with the patient. Findings include:     Standing AP and lateral scoliosis radiographs.  I measure his lumbar lordosis is 35 degrees and pelvic incidence is 39 degrees on these films.  Pelvic tilt is 16 degrees.  Maintained global sagittal and coronal alignment.  No evidence of implant failure.  Compared to his preoperative standing films from October  2018, I measure his C7 SVA is 69 mm compared to 160 mm from before surgery.     Assessment and Plan:     59 year old male 6 weeks s/p revision T10-pelvis fusion done with Dr. Orellana as co-surgeon on 6/10/19.  Patient progressing well with no evidence of hardware failure on imaging.  Overall, satisfied with this deformity corrected and happy that patient is able to stand up straight. May take 6-12 months for bones to fully heal.  We are disappointed that he is continuing to have low back pain and right rib pain, and hopefully this will continue to improve as he continues to recover.  However, discussed that sometimes nerve pain can be permanent and much of this was present before surgery with us.  In regards to restrictions, patient should lift no more than 10- 20 pounds.  Encouraged him to do cardio exercise including stationary bike and walking as tolerated.  Patient should follow-up in 2 to 3 months with repeat x-rays or sooner if necessary.    Shahnaz Maharaj PA-C    Attending MD (Dr. Salazar Sebastian) :  I reviewed and verified the history and physical exam of the patient and discussed the patient's management with the other clinical providers involved in this patient's care including any involved residents or physicians assistants. I reviewed the above note and agree with the documented findings and plan of care, which were communicated to the patient.      Salazar Sebastian MD

## 2019-07-26 NOTE — PROGRESS NOTES
Spine Surgery Return Clinic Visit      Chief Complaint:   RECHECK (6 week POP )      Interval HPI:  Symptom Profile Including: location of symptoms, onset, severity, exacerbating/alleviating factors, previous treatments:        John Olvera is a 59 year old male who is 6 weeks s/p revision T10-pelvis fusion done with Dr. Orellana as co-surgeon on 6/10/19.  Patient reports that he is overall satisfied with surgery, and he is very pleased because he is now able to stand up straight.  He has been walking around a bit, but is still having some pain in his low back and right rib pains that were both present before surgery.  He says that these pains are slightly improved, but he thought that they would be better by now.  He is not having any new pain, numbness, tingling, or weakness in any other areas.  No other questions or concerns today.            Past Medical History:     Past Medical History:   Diagnosis Date     Anxiety      Chronic back pain      CKD (chronic kidney disease)      Class 3 severe obesity due to excess calories with serious comorbidity and body mass index (BMI) of 40.0 to 44.9 in adult (H) 10/12/2018     Depression      GERD (gastroesophageal reflux disease)      Lumbar radiculopathy 10/12/2018     Prediabetes             Past Surgical History:     Past Surgical History:   Procedure Laterality Date     BACK SURGERY  2015    laminectomy     BACK SURGERY      fixation kyphoplasty lumbar spine     BACK SURGERY  2001    spinal fusion     COLONOSCOPY       EPIDIDYMECTOMY  1998     OPTICAL TRACKING SYSTEM FUSION POSTERIOR SPINE THORACIC THREE+ LEVELS N/A 6/10/2019    Procedure: Revision Thoracic 10-Pelvis Posterior Instrumented Fusion With O-Arm.Stealth, Lumbar 4 Pedicle Subtraction Osteotomy, Removal Of Prior Instrumentation;  Surgeon: Salazar Sebastian MD;  Location:  OR            Social History:     Social History     Tobacco Use     Smoking status: Former Smoker     Packs/day: 1.00      "Years: 40.00     Pack years: 40.00     Last attempt to quit: 2019     Years since quittin.4     Smokeless tobacco: Never Used     Tobacco comment: Quit 2019   Substance Use Topics     Alcohol use: Yes     Binge frequency: Less than monthly            Family History:     Family History   Problem Relation Age of Onset     Pancreatic Cancer Father      Bladder Cancer Brother      Anesthesia Reaction Daughter         PONV     Deep Vein Thrombosis (DVT) No family hx of             Allergies:     Allergies   Allergen Reactions     Diagnostic X-Ray Materials Nausea and Vomiting     Patient vomits every time he has the contrast. Has tried medication for nausea and it did not work     Silver Nitrate Rash            Medications:     Current Outpatient Medications   Medication     BusPIRone HCl (BUSPAR PO)     Escitalopram Oxalate (LEXAPRO PO)     gabapentin (NEURONTIN) 800 MG tablet     HYDROmorphone (DILAUDID) 4 MG tablet     HYDROmorphone HCl (DILAUDID PO)     methocarbamol (ROBAXIN) 500 MG tablet     OMEPRAZOLE PO     promethazine (PHENERGAN) 25 MG tablet     senna-docusate (SENOKOT-S/PERICOLACE) 8.6-50 MG tablet     senna-docusate (SENOKOT-S;PERICOLACE) 8.6-50 MG per tablet     tiZANidine (ZANAFLEX) 4 MG tablet     TRAZODONE HCL PO     VITAMIN D, CHOLECALCIFEROL, PO     Zolpidem Tartrate (AMBIEN CR PO)     No current facility-administered medications for this visit.              Review of Systems:   A focused musculoskeletal and neurologic ROS was performed with pertinent positives and negatives noted in the HPI.  Additional systems were also reviewed and are documented at the bottom of the note.         Physical Exam:   Vitals: Ht 1.778 m (5' 10\")   Wt 127.5 kg (281 lb)   BMI 40.32 kg/m    PHYSICAL EXAM:   Constitutional - Patient is healthy, well-nourished and appears stated age. Patient is obese.   Respiratory - Patient is breathing normally and in no respiratory distress.   Skin - No suspicious rashes or " lesions.   Psychiatric - Normal mood and affect.   Cardiovascular - Extremities warm and well perfused.   Eyes - Visual acuity is normal to the written word.   ENT - Hearing intact to the spoken word   GI - No abdominal distention.   Musculoskeletal - Non-antalgic gait with use of cane.        Thoracic Spine:    Appearance - Normal. Well healed midline surgical scar from V37-vbvjhdx cleft    Palpation - Non-tender to palpation    Strength/ROM - deferred            Lumbar Spine:    Appearance - Normal. Well healed midline surgical scar from U42-dilyqhw cleft; small pencil-eraser sized scab at the bottom of scar without surrounding erythma, drainage, area of fluctuance, warmth, or other signs of infection.     Palpation - Non-tender to palpation midline or paraspinal muscles.    ROM - Full     Motor -        LOWER EXTREMITY Left Right   Hip flexion 5/5 5/5   Knee flexion 5/5 5/5   Knee extension 5/5 5/5   Ankle dorsiflexion 5/5 5/5   Ankle plantarflexion 5/5 5/5   Great toe extension 5/5 5/5        Special tests -     Straight leg raise - negative      Neurologic - Sensation intact to light touch bilaterally.    Alignment:  Patient stands straight with a neutral standing sagittal balance.         Imaging:   We ordered and independently reviewed new radiographs at this clinic visit. The results were discussed with the patient. Findings include:     Standing AP and lateral scoliosis radiographs.  I measure his lumbar lordosis is 35 degrees and pelvic incidence is 39 degrees on these films.  Pelvic tilt is 16 degrees.  Maintained global sagittal and coronal alignment.  No evidence of implant failure.  Compared to his preoperative standing films from October 2018, I measure his C7 SVA is 69 mm compared to 160 mm from before surgery.       Assessment and Plan:     59 year old male 6 weeks s/p revision T10-pelvis fusion done with Dr. Orellana as co-surgeon on 6/10/19.  Patient progressing well with no evidence of hardware  failure on imaging.  Overall, satisfied with this deformity corrected and happy that patient is able to stand up straight. May take 6-12 months for bones to fully heal.  We are disappointed that he is continuing to have low back pain and right rib pain, and hopefully this will continue to improve as he continues to recover.  However, discussed that sometimes nerve pain can be permanent and much of this was present before surgery with us.  In regards to restrictions, patient should lift no more than 10- 20 pounds.  Encouraged him to do cardio exercise including stationary bike and walking as tolerated.  Patient should follow-up in 2 to 3 months with repeat x-rays or sooner if necessary.    Attending MD (Dr. Salazar Sebastian) :  I reviewed and verified the history and physical exam of the patient and discussed the patient's management with the other clinical providers involved in this patient's care including any involved residents or physicians assistants. I reviewed the above note and agree with the documented findings and plan of care, which were communicated to the patient.      MD Shahnaz Orourke PA-C    Respectfully,  Salazar Sebastian MD  Spine Surgery  [facility]      Answers for HPI/ROS submitted by the patient on 7/23/2019   General Symptoms: Yes  Skin Symptoms: No  HENT Symptoms: No  EYE SYMPTOMS: No  HEART SYMPTOMS: No  LUNG SYMPTOMS: No  INTESTINAL SYMPTOMS: Yes  URINARY SYMPTOMS: No  REPRODUCTIVE SYMPTOMS: No  SKELETAL SYMPTOMS: Yes  BLOOD SYMPTOMS: No  NERVOUS SYSTEM SYMPTOMS: Yes  MENTAL HEALTH SYMPTOMS: Yes  Fever: No  Loss of appetite: Yes  Weight loss: No  Weight gain: Yes  Fatigue: Yes  Night sweats: Yes  Chills: No  Increased stress: Yes  Excessive hunger: No  Excessive thirst: No  Feeling hot or cold when others believe the temperature is normal: No  Loss of height: No  Post-operative complications: No  Surgical site pain: Yes  Hallucinations:  No  Change in or Loss of Energy: Yes  Hyperactivity: No  Confusion: No  Heart burn or indigestion: No  Nausea: No  Vomiting: No  Abdominal pain: No  Bloating: No  Constipation: No  Diarrhea: Yes  Blood in stool: No  Black stools: No  Rectal or Anal pain: No  Fecal incontinence: No  Yellowing of skin or eyes: No  Vomit with blood: No  Change in stools: No  Back pain: Yes  Muscle aches: Yes  Neck pain: No  Swollen joints: No  Joint pain: Yes  Bone pain: No  Muscle cramps: Yes  Muscle weakness: No  Joint stiffness: Yes  Bone fracture: No  Trouble with coordination: No  Dizziness or trouble with balance: No  Fainting or black-out spells: No  Memory loss: No  Headache: No  Seizures: No  Speech problems: No  Tingling: Yes  Tremor: No  Weakness: No  Difficulty walking: Yes  Paralysis: No  Numbness: No  Nervous or Anxious: Yes  Depression: Yes  Trouble sleeping: Yes  Trouble thinking or concentrating: Yes  Mood changes: Yes  Panic attacks: No

## 2019-09-05 ENCOUNTER — MYC MEDICAL ADVICE (OUTPATIENT)
Dept: ORTHOPEDICS | Facility: CLINIC | Age: 59
End: 2019-09-05

## 2019-10-02 ENCOUNTER — DOCUMENTATION ONLY (OUTPATIENT)
Dept: CARE COORDINATION | Facility: CLINIC | Age: 59
End: 2019-10-02

## 2019-10-13 ASSESSMENT — ENCOUNTER SYMPTOMS
CHILLS: 0
DIARRHEA: 1
NAUSEA: 0
ALTERED TEMPERATURE REGULATION: 0
MUSCLE CRAMPS: 1
ARTHRALGIAS: 0
NERVOUS/ANXIOUS: 1
FEVER: 0
MYALGIAS: 1
WEIGHT GAIN: 0
WEIGHT LOSS: 0
JAUNDICE: 0
FATIGUE: 1
ABDOMINAL PAIN: 0
HALLUCINATIONS: 0
HEARTBURN: 0
PANIC: 1
POLYDIPSIA: 0
VOMITING: 0
INCREASED ENERGY: 0
NIGHT SWEATS: 1
DECREASED CONCENTRATION: 0
BLOATING: 0
NECK PAIN: 0
BLOOD IN STOOL: 0
POLYPHAGIA: 0
CONSTIPATION: 1
STIFFNESS: 0
JOINT SWELLING: 0
DECREASED APPETITE: 0
MUSCLE WEAKNESS: 0
BOWEL INCONTINENCE: 0
INSOMNIA: 1
BACK PAIN: 1
DEPRESSION: 1
RECTAL PAIN: 0

## 2019-10-16 DIAGNOSIS — M54.16 LUMBAR RADICULOPATHY: Primary | ICD-10-CM

## 2019-10-18 ENCOUNTER — ANCILLARY PROCEDURE (OUTPATIENT)
Dept: CT IMAGING | Facility: CLINIC | Age: 59
End: 2019-10-18
Attending: ORTHOPAEDIC SURGERY
Payer: OTHER MISCELLANEOUS

## 2019-10-18 ENCOUNTER — OFFICE VISIT (OUTPATIENT)
Dept: ORTHOPEDICS | Facility: CLINIC | Age: 59
End: 2019-10-18
Payer: OTHER MISCELLANEOUS

## 2019-10-18 ENCOUNTER — ANCILLARY PROCEDURE (OUTPATIENT)
Dept: GENERAL RADIOLOGY | Facility: CLINIC | Age: 59
End: 2019-10-18
Attending: ORTHOPAEDIC SURGERY
Payer: OTHER MISCELLANEOUS

## 2019-10-18 VITALS — WEIGHT: 281 LBS | HEIGHT: 70 IN | BODY MASS INDEX: 40.23 KG/M2

## 2019-10-18 DIAGNOSIS — M54.16 LUMBAR RADICULOPATHY: ICD-10-CM

## 2019-10-18 DIAGNOSIS — M54.16 LUMBAR RADICULOPATHY: Primary | ICD-10-CM

## 2019-10-18 ASSESSMENT — MIFFLIN-ST. JEOR: SCORE: 2095.86

## 2019-10-18 NOTE — LETTER
10/18/2019       RE: John Olvera  [address] [facility] [address]     Dear Colleague,    Thank you for referring your patient, John Olvera, to the [facility] at [facility]. Please see a copy of my visit note below.    Spine Surgery Return Clinic Visit      Chief Complaint:   RECHECK (follow up revision T10-Pelvis )      Interval HPI:  Symptom Profile Including: location of symptoms, onset, severity, exacerbating/alleviating factors, previous treatments:        John Olvera is a 59 year old male who returns today status post revision thoracolumbar fusion with pedicle subtraction osteotomy for flat back.  He also had a proximal nonunion.  His surgery was done after multiple previous operations by an outside surgeon which were complicated by wound issues as well as misplaced hardware which had resulted in chronic radicular complaints particularly in the thoracic region.    We did revise his screws.  I was hopeful that this would help with the thoracic radicular complaints but he returns today noting that he still dealing with that right sided chest wall pain.  We felt this was possibly due to medially paced pedicle screws in the thoracic region.  Unfortunately it has not gotten better after her surgery.  Otherwise he is doing quite well.  He feels like he is able to stand up straight and walk around quite a bit better.  He is gotten a stationary bicycle and he has been increasing his activities with this.  He is wondering today what can be done about the thoracic radicular pain.            Past Medical History:     Past Medical History:   Diagnosis Date     Anxiety      Chronic back pain      CKD (chronic kidney disease)      Class 3 severe obesity due to excess calories with serious comorbidity and body mass index (BMI) of 40.0 to 44.9 in adult (H) 10/12/2018     Depression      GERD (gastroesophageal reflux disease)      Lumbar radiculopathy 10/12/2018      Prediabetes             Past Surgical History:     Past Surgical History:   Procedure Laterality Date     BACK SURGERY  2015    laminectomy     BACK SURGERY      fixation kyphoplasty lumbar spine     BACK SURGERY      spinal fusion     COLONOSCOPY       EPIDIDYMECTOMY       OPTICAL TRACKING SYSTEM FUSION POSTERIOR SPINE THORACIC THREE+ LEVELS N/A 6/10/2019    Procedure: Revision Thoracic 10-Pelvis Posterior Instrumented Fusion With O-Arm.Stealth, Lumbar 4 Pedicle Subtraction Osteotomy, Removal Of Prior Instrumentation;  Surgeon: Salazar Sebastian MD;  Location: [facility] OR            Social History:     Social History     Tobacco Use     Smoking status: Former Smoker     Packs/day: 1.00     Years: 40.00     Pack years: 40.00     Last attempt to quit: 2019     Years since quittin.7     Smokeless tobacco: Never Used     Tobacco comment: Quit 2019   Substance Use Topics     Alcohol use: Yes     Binge frequency: Less than monthly            Family History:     Family History   Problem Relation Age of Onset     Pancreatic Cancer Father      Bladder Cancer Brother      Anesthesia Reaction Daughter         PONV     Deep Vein Thrombosis (DVT) No family hx of             Allergies:     Allergies   Allergen Reactions     Diagnostic X-Ray Materials Nausea and Vomiting     Patient vomits every time he has the contrast. Has tried medication for nausea and it did not work     Silver Nitrate Rash            Medications:     Current Outpatient Medications   Medication     BusPIRone HCl (BUSPAR PO)     Escitalopram Oxalate (LEXAPRO PO)     gabapentin (NEURONTIN) 800 MG tablet     HYDROmorphone (DILAUDID) 4 MG tablet     HYDROmorphone HCl (DILAUDID PO)     methocarbamol (ROBAXIN) 500 MG tablet     OMEPRAZOLE PO     promethazine (PHENERGAN) 25 MG tablet     senna-docusate (SENOKOT-S/PERICOLACE) 8.6-50 MG tablet     senna-docusate (SENOKOT-S;PERICOLACE) 8.6-50 MG per tablet     tiZANidine (ZANAFLEX) 4 MG tablet      "TRAZODONE HCL PO     VITAMIN D, CHOLECALCIFEROL, PO     Zolpidem Tartrate (AMBIEN CR PO)     No current facility-administered medications for this visit.              Review of Systems:   A focused musculoskeletal and neurologic ROS was performed with pertinent positives and negatives noted in the HPI.  Additional systems were also reviewed and are documented at the bottom of the note.         Physical Exam:   Vitals: Ht 1.778 m (5' 10\")   Wt 127.5 kg (281 lb)   BMI 40.32 kg/m     Musculoskeletal, Neurologic, and Spine:          Lumbar Spine:    Appearance - No gross stepoffs or deformities    Motor -     L2-3: Hip flexion 5/5 R and 5/5 L strength          L3/4:  Knee extension R 5/5 and L 5/5 strength         L4/5:  Foot dorsiflexion R 5/5 L 5/5 and       EHL dorsiflexion R 4/5 L 4/5 strength         S1:  Plantarflexion/Peroneal Muscles  R 5/5 and L 5/5 strength    Sensation: intact to light touch L3-S1 distribution BLE      Neurologic:      REFLEXES Left Right                  Patella 1+ 1+   Ankle jerk 1+ 1+   Babinski No upgoing great toe No upgoing great toe   Clonus 0 beats 0 beats     Hip Exam:  No pain with hip log roll and no tenderness over the greater trochanters.    Alignment:  Patient stands with a neutral standing sagittal balance.         Imaging:   We ordered and independently reviewed new radiographs at this clinic visit. The results were discussed with the patient. Findings include:     Standing scoliosis radiographs today show unchanged position of the implants.  He has thoracic hyperkyphosis contributing to approximately 9 cm positive C7 SVA.  I measure his lumbar lordosis is 32 degrees against a pelvic incidence of 40 degrees.  No problems with the lumbar hardware.     Assessment and Plan:     59 year old male with continued thoracic radiculopathy.  He is improved with his global standing balance and is very happy with that change.    I recommended a CT scan to make sure there is no residual " compression on the thoracic level nerve roots.  If there is no mechanical issue, then I would recommend he be considered for a nerve root stimulator which is a somewhat newer technology.  I will contact our pain clinic about this.  I told the patient I would contact him after the CT results and after I have heard back from our pain team as to whether or not this is an option.     Respectfully,  Salazar Sebastian MD  Spine Surgery  [facility]

## 2019-10-18 NOTE — PROGRESS NOTES
Spine Surgery Return Clinic Visit      Chief Complaint:   RECHECK (follow up revision T10-Pelvis )      Interval HPI:  Symptom Profile Including: location of symptoms, onset, severity, exacerbating/alleviating factors, previous treatments:        John Olvera is a 59 year old male who returns today status post revision thoracolumbar fusion with pedicle subtraction osteotomy for flat back.  He also had a proximal nonunion.  His surgery was done after multiple previous operations by an outside surgeon which were complicated by wound issues as well as misplaced hardware which had resulted in chronic radicular complaints particularly in the thoracic region.    We did revise his screws.  I was hopeful that this would help with the thoracic radicular complaints but he returns today noting that he still dealing with that right sided chest wall pain.  We felt this was possibly due to medially paced pedicle screws in the thoracic region.  Unfortunately it has not gotten better after her surgery.  Otherwise he is doing quite well.  He feels like he is able to stand up straight and walk around quite a bit better.  He is gotten a stationary bicycle and he has been increasing his activities with this.  He is wondering today what can be done about the thoracic radicular pain.            Past Medical History:     Past Medical History:   Diagnosis Date     Anxiety      Chronic back pain      CKD (chronic kidney disease)      Class 3 severe obesity due to excess calories with serious comorbidity and body mass index (BMI) of 40.0 to 44.9 in adult (H) 10/12/2018     Depression      GERD (gastroesophageal reflux disease)      Lumbar radiculopathy 10/12/2018     Prediabetes             Past Surgical History:     Past Surgical History:   Procedure Laterality Date     BACK SURGERY  2015    laminectomy     BACK SURGERY      fixation kyphoplasty lumbar spine     BACK SURGERY  2001    spinal fusion     COLONOSCOPY       EPIDIDYMECTOMY  1998      OPTICAL TRACKING SYSTEM FUSION POSTERIOR SPINE THORACIC THREE+ LEVELS N/A 6/10/2019    Procedure: Revision Thoracic 10-Pelvis Posterior Instrumented Fusion With O-Arm.Stealth, Lumbar 4 Pedicle Subtraction Osteotomy, Removal Of Prior Instrumentation;  Surgeon: Salazar Sebastian MD;  Location: [facility] OR            Social History:     Social History     Tobacco Use     Smoking status: Former Smoker     Packs/day: 1.00     Years: 40.00     Pack years: 40.00     Last attempt to quit: 2019     Years since quittin.7     Smokeless tobacco: Never Used     Tobacco comment: Quit 2019   Substance Use Topics     Alcohol use: Yes     Binge frequency: Less than monthly            Family History:     Family History   Problem Relation Age of Onset     Pancreatic Cancer Father      Bladder Cancer Brother      Anesthesia Reaction Daughter         PONV     Deep Vein Thrombosis (DVT) No family hx of             Allergies:     Allergies   Allergen Reactions     Diagnostic X-Ray Materials Nausea and Vomiting     Patient vomits every time he has the contrast. Has tried medication for nausea and it did not work     Silver Nitrate Rash            Medications:     Current Outpatient Medications   Medication     BusPIRone HCl (BUSPAR PO)     Escitalopram Oxalate (LEXAPRO PO)     gabapentin (NEURONTIN) 800 MG tablet     HYDROmorphone (DILAUDID) 4 MG tablet     HYDROmorphone HCl (DILAUDID PO)     methocarbamol (ROBAXIN) 500 MG tablet     OMEPRAZOLE PO     promethazine (PHENERGAN) 25 MG tablet     senna-docusate (SENOKOT-S/PERICOLACE) 8.6-50 MG tablet     senna-docusate (SENOKOT-S;PERICOLACE) 8.6-50 MG per tablet     tiZANidine (ZANAFLEX) 4 MG tablet     TRAZODONE HCL PO     VITAMIN D, CHOLECALCIFEROL, PO     Zolpidem Tartrate (AMBIEN CR PO)     No current facility-administered medications for this visit.              Review of Systems:   A focused musculoskeletal and neurologic ROS was performed with pertinent positives and  "negatives noted in the HPI.  Additional systems were also reviewed and are documented at the bottom of the note.         Physical Exam:   Vitals: Ht 1.778 m (5' 10\")   Wt 127.5 kg (281 lb)   BMI 40.32 kg/m    Musculoskeletal, Neurologic, and Spine:          Lumbar Spine:    Appearance - No gross stepoffs or deformities    Motor -     L2-3: Hip flexion 5/5 R and 5/5 L strength          L3/4:  Knee extension R 5/5 and L 5/5 strength         L4/5:  Foot dorsiflexion R 5/5 L 5/5 and       EHL dorsiflexion R 4/5 L 4/5 strength         S1:  Plantarflexion/Peroneal Muscles  R 5/5 and L 5/5 strength    Sensation: intact to light touch L3-S1 distribution BLE      Neurologic:      REFLEXES Left Right                  Patella 1+ 1+   Ankle jerk 1+ 1+   Babinski No upgoing great toe No upgoing great toe   Clonus 0 beats 0 beats     Hip Exam:  No pain with hip log roll and no tenderness over the greater trochanters.    Alignment:  Patient stands with a neutral standing sagittal balance.           Imaging:   We ordered and independently reviewed new radiographs at this clinic visit. The results were discussed with the patient. Findings include:     Standing scoliosis radiographs today show unchanged position of the implants.  He has thoracic hyperkyphosis contributing to approximately 9 cm positive C7 SVA.  I measure his lumbar lordosis is 32 degrees against a pelvic incidence of 40 degrees.  No problems with the lumbar hardware.       Assessment and Plan:     59 year old male with continued thoracic radiculopathy.  He is improved with his global standing balance and is very happy with that change.    I recommended a CT scan to make sure there is no residual compression on the thoracic level nerve roots.  If there is no mechanical issue, then I would recommend he be considered for a nerve root stimulator which is a somewhat newer technology.  I will contact our pain clinic about this.  I told the patient I would contact him " after the CT results and after I have heard back from our pain team as to whether or not this is an option.           Respectfully,  Salazar Sebastian MD  Spine Surgery  [facility]    Answers for HPI/ROS submitted by the patient on 10/13/2019   General Symptoms: Yes  Skin Symptoms: No  HENT Symptoms: No  EYE SYMPTOMS: No  HEART SYMPTOMS: No  LUNG SYMPTOMS: No  INTESTINAL SYMPTOMS: Yes  URINARY SYMPTOMS: No  REPRODUCTIVE SYMPTOMS: No  SKELETAL SYMPTOMS: Yes  BLOOD SYMPTOMS: No  NERVOUS SYSTEM SYMPTOMS: No  MENTAL HEALTH SYMPTOMS: Yes  Fever: No  Loss of appetite: No  Weight loss: No  Weight gain: No  Fatigue: Yes  Night sweats: Yes  Chills: No  Increased stress: No  Excessive hunger: No  Excessive thirst: No  Feeling hot or cold when others believe the temperature is normal: No  Loss of height: No  Post-operative complications: No  Surgical site pain: No  Hallucinations: No  Change in or Loss of Energy: No  Hyperactivity: No  Confusion: No  Heart burn or indigestion: No  Nausea: No  Vomiting: No  Abdominal pain: No  Bloating: No  Constipation: Yes  Diarrhea: Yes  Blood in stool: No  Black stools: No  Rectal or Anal pain: No  Fecal incontinence: No  Yellowing of skin or eyes: No  Vomit with blood: No  Change in stools: No  Back pain: Yes  Muscle aches: Yes  Neck pain: No  Swollen joints: No  Joint pain: No  Bone pain: No  Muscle cramps: Yes  Muscle weakness: No  Joint stiffness: No  Bone fracture: No  Nervous or Anxious: Yes  Depression: Yes  Trouble sleeping: Yes  Trouble thinking or concentrating: No  Mood changes: Yes  Panic attacks: Yes

## 2019-10-18 NOTE — NURSING NOTE
"Reason For Visit:   Chief Complaint   Patient presents with     RECHECK     follow up revision T10-Pelvis        Primary MD: Sarah Daly  Ref. MD: Provider not in system   Date of surgery: Dr. Sebastian   Type of surgery: Dr. Sebastian .  Smoker: No  Request smoking cessation information: No    Ht 1.778 m (5' 10\")   Wt 127.5 kg (281 lb)   BMI 40.32 kg/m           Oswestry (ANNITA) Questionnaire    OSWESTRY DISABILITY INDEX 10/7/2019   Count 9   Sum 23   Oswestry Score (%) 51.11   Some recent data might be hidden            Neck Disability Index (NDI) Questionnaire    No flowsheet data found.                Promis 10 Assessment    PROMIS 10 10/13/2019   In general, would you say your health is: Fair   In general, would you say your quality of life is: Poor   In general, how would you rate your physical health? Fair   In general, how would you rate your mental health, including your mood and your ability to think? Poor   In general, how would you rate your satisfaction with your social activities and relationships? Poor   In general, please rate how well you carry out your usual social activities and roles Poor   To what extent are you able to carry out your everyday physical activities such as walking, climbing stairs, carrying groceries, or moving a chair? A little   How often have you been bothered by emotional problems such as feeling anxious, depressed or irritable? Often   How would you rate your fatigue on average? Mild   How would you rate your pain on average?   0 = No Pain  to  10 = Worst Imaginable Pain 8   In general, would you say your health is: 2   In general, would you say your quality of life is: 1   In general, how would you rate your physical health? 2   In general, how would you rate your mental health, including your mood and your ability to think? 1   In general, how would you rate your satisfaction with your social activities and relationships? 1   In general, please rate how well you carry out " your usual social activities and roles. (This includes activities at home, at work and in your community, and responsibilities as a parent, child, spouse, employee, friend, etc.) 1   To what extent are you able to carry out your everyday physical activities such as walking, climbing stairs, carrying groceries, or moving a chair? 2   In the past 7 days, how often have you been bothered by emotional problems such as feeling anxious, depressed, or irritable? 4   In the past 7 days, how would you rate your fatigue on average? 2   In the past 7 days, how would you rate your pain on average, where 0 means no pain, and 10 means worst imaginable pain? 8   Global Mental Health Score 5   Global Physical Health Score 10   PROMIS TOTAL - SUBSCORES 15   Some recent data might be hidden                Kali Ortiz ATC

## 2019-10-22 NOTE — TELEPHONE ENCOUNTER
Per Dr. Sebastian's request, RN sent a message to Jeff Lentz from pain clinic to set up an appt for patient to be seen by Dr. Lezama for evaluation of a root or spinal cord stimulator.    Evelyn Taylor RN

## 2019-10-22 NOTE — PROGRESS NOTES
"[facility] Internal Medicine Progress Note            Interval History:   Record reviewed.  Discussed  with RN.  S/p Revision Thoracic 10-Pelvis Posterior Instrumented Fusion With O-Arm.Stealth, Lumbar 4 Pedicle Subtraction Osteotomy, Removal Of Prior Instrumentation on 6/10 with Dr. Sebastian and Dr. Orellana.  EBL 2200 ml's.  Doing reasonably well.  No appreciable painful compromise while sedentary in bed.  RLE dysesthesia with standing and weight baring.  Ambulatory with improved mobility.  No postural LH, CP, SOB, cough, nausea, reflux, abd pain.  BM last night.  Improved po intake.  Voiding OK.           Medications:   All medications reviewed today          Physical Exam:   Blood pressure 131/46, pulse 86, temperature 98.8  F (37.1  C), temperature source Oral, resp. rate 16, height 1.778 m (5' 10\"), weight 127.6 kg (281 lb 4.9 oz), SpO2 96 %.    Intake/Output Summary (Last 24 hours) at 6/12/2019 1354  Last data filed at 6/12/2019 0838  Gross per 24 hour   Intake 240 ml   Output 3330 ml   Net -3090 ml       General:  Alert.  Appropriate.  No distress.  No O2.     Heent:      Neck:    Skin:    Chest:  clear    Cardiac:  Reg without gallop, murmur.  No JVD.     Abdomen:  Non distended, soft, non-tender.  BS normal.     Extremities:  Perfused.  No edema, calf, posterior thigh tenderness to suggest DVT.     Neuro:            Data:     Results for orders placed or performed during the hospital encounter of 06/10/19 (from the past 24 hour(s))   Hemoglobin   Result Value Ref Range    Hemoglobin 8.2 (L) 13.3 - 17.7 g/dL     Hemoglobin   Date Value Ref Range Status   06/16/2019 8.2 (L) 13.3 - 17.7 g/dL Final   06/15/2019 8.2 (L) 13.3 - 17.7 g/dL Final     Last Comprehensive Metabolic Panel:  Sodium   Date Value Ref Range Status   06/14/2019 139 133 - 144 mmol/L Final     Potassium   Date Value Ref Range Status   06/14/2019 3.5 3.4 - 5.3 mmol/L Final     Chloride   Date Value Ref Range Status   06/14/2019 105 94 - " MED REFILL REQUEST      SERTRALINE 100MG (1) QD    [facility]   109 mmol/L Final     Carbon Dioxide   Date Value Ref Range Status   06/14/2019 27 20 - 32 mmol/L Final     Anion Gap   Date Value Ref Range Status   06/14/2019 7 3 - 14 mmol/L Final     Glucose   Date Value Ref Range Status   06/14/2019 128 (H) 70 - 99 mg/dL Final     Urea Nitrogen   Date Value Ref Range Status   06/14/2019 9 7 - 30 mg/dL Final     Creatinine   Date Value Ref Range Status   06/14/2019 1.17 0.66 - 1.25 mg/dL Final     GFR Estimate   Date Value Ref Range Status   06/14/2019 68 >60 mL/min/[1.73_m2] Final     Comment:     Non  GFR Calc  Starting 12/18/2018, serum creatinine based estimated GFR (eGFR) will be   calculated using the Chronic Kidney Disease Epidemiology Collaboration   (CKD-EPI) equation.       Calcium   Date Value Ref Range Status   06/14/2019 7.6 (L) 8.5 - 10.1 mg/dL Final                Assessment and Plan:   1)  S/P Revision Thoracic 10-Pelvis Posterior Instrumented Fusion With O-Arm.Stealth, Lumbar 4 Pedicle Subtraction Osteotomy, Removal Of Prior Instrumentation on 6/10 with Dr. Sebastian and Dr. Orellana.  EBL 2200 ml's.  Adequate pain control.  Mobilizing. Improved pain control except for persistent RLE dysesthesia.  Suspect relates to surgical nerve irritation. Already on high dose neurontin.   2)  Lactic acidosis normalized.  Likely related to decrease volume, hypoperfusion.  3)  Acute blood loss anemia.  Adequate.   Stable.    4)  GERD controlled on increase PPI.    5)  CKD, stable.   6)  Anxiety, depression appears compensated.   7)  Thrombocytopenia c/w consumption.   Normalized.     PLAN:  1)  Working on TCU.  Need WC insurance authorization.   2)  Same opiates, gabapentin, IS, bowel meds, mobilize, mechanical DVT prophylaxis.  3)  Omeprazole BID.   Boost.  4)  Trend labs.  Monitor clinically.   Disposition Plan   Expected discharge in approx 1 day to TCU once insurance authorization in place.      Entered: Miguel Walters 06/16/2019, 9:19 AM               Attestation:  I have reviewed today's vital signs, notes, medications, labs and imaging.     Miguel Walters MD

## 2019-10-23 ENCOUNTER — TELEPHONE (OUTPATIENT)
Dept: ANESTHESIOLOGY | Facility: CLINIC | Age: 59
End: 2019-10-23

## 2019-10-23 NOTE — TELEPHONE ENCOUNTER
Spoke with pt regarding scheduling. Pt stated he needed to think about it and see if he can get a ride. Requested a mychart msg with contact info to respond once he finds out. Mychart msg sent.    ----- Message from Jeff Lentz RN sent at 10/23/2019  1:59 PM CDT -----  Hello,    Please call and offer this patient an appointment with Dr. Miller in the Pain Clinic 11/11 at 12:10pm. I did hold the spot for him so we don't lose it.    This is the soonest we can get him in. He can also schedule for next available with any doctor if this does not work for him.    In case he asks:  He was originally referred to Dr. Lezama by Dr. Sebastian - Dr. Sebastian is okay if he sees another doctor as they all can see this patient to discuss pain management.     Thank you, Jeff

## 2019-10-29 ENCOUNTER — TELEPHONE (OUTPATIENT)
Dept: ANESTHESIOLOGY | Facility: CLINIC | Age: 59
End: 2019-10-29

## 2019-10-29 NOTE — TELEPHONE ENCOUNTER
Pt contacted me through [employer] that nov 11th won't work for him for the pain clinic. Left vm with a few options and left my direct number.

## 2019-11-04 ENCOUNTER — HEALTH MAINTENANCE LETTER (OUTPATIENT)
Age: 59
End: 2019-11-04

## 2019-11-13 ENCOUNTER — TELEPHONE (OUTPATIENT)
Dept: ANESTHESIOLOGY | Facility: CLINIC | Age: 59
End: 2019-11-13

## 2019-11-13 NOTE — TELEPHONE ENCOUNTER
Dr. Caal added a clinic on 11/19 and 11/20, please call and see if this patient can come in that day.

## 2019-12-13 ENCOUNTER — OFFICE VISIT (OUTPATIENT)
Dept: ANESTHESIOLOGY | Facility: CLINIC | Age: 59
End: 2019-12-13
Payer: OTHER MISCELLANEOUS

## 2019-12-13 VITALS
HEART RATE: 72 BPM | HEIGHT: 70 IN | DIASTOLIC BLOOD PRESSURE: 91 MMHG | OXYGEN SATURATION: 94 % | RESPIRATION RATE: 16 BRPM | SYSTOLIC BLOOD PRESSURE: 128 MMHG | WEIGHT: 282 LBS | BODY MASS INDEX: 40.37 KG/M2

## 2019-12-13 DIAGNOSIS — M54.16 LUMBAR RADICULOPATHY: Primary | ICD-10-CM

## 2019-12-13 ASSESSMENT — ENCOUNTER SYMPTOMS
NECK PAIN: 0
MYALGIAS: 1
WEIGHT LOSS: 0
INSOMNIA: 0
NIGHT SWEATS: 0
POLYPHAGIA: 0
CHILLS: 0
NERVOUS/ANXIOUS: 1
POLYDIPSIA: 0
DEPRESSION: 1
BLOATING: 0
BACK PAIN: 1
BOWEL INCONTINENCE: 0
WEIGHT GAIN: 1
MUSCLE CRAMPS: 1
DECREASED CONCENTRATION: 1
RECTAL PAIN: 0
STIFFNESS: 1
HEARTBURN: 0
NAUSEA: 0
FEVER: 0
DIARRHEA: 1
FATIGUE: 1
DECREASED APPETITE: 0
ARTHRALGIAS: 0
VOMITING: 0
JOINT SWELLING: 0
PANIC: 0
ABDOMINAL PAIN: 0
JAUNDICE: 0
MUSCLE WEAKNESS: 1
ALTERED TEMPERATURE REGULATION: 1
BLOOD IN STOOL: 0
CONSTIPATION: 1
HALLUCINATIONS: 0
INCREASED ENERGY: 0

## 2019-12-13 ASSESSMENT — ANXIETY QUESTIONNAIRES
2. NOT BEING ABLE TO STOP OR CONTROL WORRYING: MORE THAN HALF THE DAYS
1. FEELING NERVOUS, ANXIOUS, OR ON EDGE: SEVERAL DAYS
GAD7 TOTAL SCORE: 8
7. FEELING AFRAID AS IF SOMETHING AWFUL MIGHT HAPPEN: NOT AT ALL
6. BECOMING EASILY ANNOYED OR IRRITABLE: SEVERAL DAYS
GAD7 TOTAL SCORE: 8
4. TROUBLE RELAXING: MORE THAN HALF THE DAYS
5. BEING SO RESTLESS THAT IT IS HARD TO SIT STILL: SEVERAL DAYS
3. WORRYING TOO MUCH ABOUT DIFFERENT THINGS: SEVERAL DAYS
7. FEELING AFRAID AS IF SOMETHING AWFUL MIGHT HAPPEN: NOT AT ALL

## 2019-12-13 ASSESSMENT — PATIENT HEALTH QUESTIONNAIRE - PHQ9
SUM OF ALL RESPONSES TO PHQ QUESTIONS 1-9: 9
10. IF YOU CHECKED OFF ANY PROBLEMS, HOW DIFFICULT HAVE THESE PROBLEMS MADE IT FOR YOU TO DO YOUR WORK, TAKE CARE OF THINGS AT HOME, OR GET ALONG WITH OTHER PEOPLE: VERY DIFFICULT
SUM OF ALL RESPONSES TO PHQ QUESTIONS 1-9: 9

## 2019-12-13 ASSESSMENT — PAIN SCALES - GENERAL: PAINLEVEL: SEVERE PAIN (7)

## 2019-12-13 ASSESSMENT — MIFFLIN-ST. JEOR: SCORE: 2100.39

## 2019-12-13 NOTE — LETTER
"12/13/2019       RE: John Olvera  [address] [facility] [facility] [address]     Dear Colleague,    Thank you for referring your patient, John Olvera, to the [facility]IVE [facility] MANAGEMENT at [facility]. Please see a copy of my visit note below.    The patient is a 58 y/o man with a chief complaint of mid thoracic spine pain.  He has had multiple spine surgeries, the most recent of which was March 2018 (spinal fusion).  He has back pain with a radicular component.  The pain radiates to the right.  The pain is 5-6/10 in severity.  The pain is said to be constant.  It is described as sharp steady and aching.  The patient reports having a \"nerve block\" in [facility] which provided temporary relief.  He has not identified any other alleviating factors.  The pain is exacerbated by activity.  Aside from the nerve, he has tried gabapentin with moderate relief.  He presents for evaluation for possible neuromodulation.  Current Outpatient Medications   Medication     BusPIRone HCl (BUSPAR PO)     Escitalopram Oxalate (LEXAPRO PO)     gabapentin (NEURONTIN) 800 MG tablet     HYDROmorphone HCl (DILAUDID PO)     OMEPRAZOLE PO     promethazine (PHENERGAN) 25 MG tablet     senna-docusate (SENOKOT-S/PERICOLACE) 8.6-50 MG tablet     senna-docusate (SENOKOT-S;PERICOLACE) 8.6-50 MG per tablet     tiZANidine (ZANAFLEX) 4 MG tablet     TRAZODONE HCL PO     VITAMIN D, CHOLECALCIFEROL, PO     Zolpidem Tartrate (AMBIEN CR PO)     No current facility-administered medications for this visit.      Allergies   Allergen Reactions     Diagn[employer] Nausea and Vomiting     Patient vomits every time he has the contrast. Has tried medication for nausea and it did not work     Silver Nitrate Rash     Past Medical History:   Diagnosis Date     Anxiety      Chronic back pain      CKD (chronic kidney disease)      Class 3 severe obesity due to excess calories with serious " comorbidity and body mass index (BMI) of 40.0 to 44.9 in adult (H) 10/12/2018     Depression      GERD (gastroesophageal reflux disease)      Lumbar radiculopathy 10/12/2018     Prediabetes      Past Surgical History:   Procedure Laterality Date     BACK SURGERY  2015    laminectomy     BACK SURGERY      fixation kyphoplasty lumbar spine     BACK SURGERY  2001    spinal fusion     COLONOSCOPY       EPIDIDYMECTOMY       OPTICAL TRACKING SYSTEM FUSION POSTERIOR SPINE THORACIC THREE+ LEVELS N/A 6/10/2019    Procedure: Revision Thoracic 10-Pelvis Posterior Instrumented Fusion With O-Arm.Stealth, Lumbar 4 Pedicle Subtraction Osteotomy, Removal Of Prior Instrumentation;  Surgeon: Salazar Sebastian MD;  Location: [facility] OR     Family History   Problem Relation Age of Onset     Pancreatic Cancer Father      Bladder Cancer Brother      Anesthesia Reaction Daughter         PONV     Deep Vein Thrombosis (DVT) No family hx of      Social History     Socioeconomic History     Marital status:      Spouse name: Not on file     Number of children: Not on file     Years of education: Not on file     Highest education level: Not on file   Occupational History     Not on file   Social Needs     Financial resource strain: Not on file     Food insecurity:     Worry: Not on file     Inability: Not on file     Transportation needs:     Medical: Not on file     Non-medical: Not on file   Tobacco Use     Smoking status: Former Smoker     Packs/day: 1.00     Years: 40.00     Pack years: 40.00     Last attempt to quit: 2019     Years since quittin.9     Smokeless tobacco: Never Used     Tobacco comment: Quit 2019   Substance and Sexual Activity     Alcohol use: Yes     Binge frequency: Less than monthly     Drug use: No     Sexual activity: Not on file   Lifestyle     Physical activity:     Days per week: Not on file     Minutes per session: Not on file     Stress: Not on file   Relationships     Social connections:      Talks on phone: Not on file     Gets together: Not on file     Attends Sikhism service: Not on file     Active member of club or organization: Not on file     Attends meetings of clubs or organizations: Not on file     Relationship status: Not on file     Intimate partner violence:     Fear of current or ex partner: Not on file     Emotionally abused: Not on file     Physically abused: Not on file     Forced sexual activity: Not on file   Other Topics Concern     Not on file   Social History Narrative     Not on file      ROS: 10 point ROS neg other than the symptoms noted above in the HPI.  Physical Exam  Vitals signs and nursing note reviewed.   Constitutional:       Appearance: Normal appearance.   HENT:      Head: Normocephalic and atraumatic.      Nose: Nose normal.      Mouth/Throat:      Mouth: Mucous membranes are moist.   Eyes:      Extraocular Movements: Extraocular movements intact.      Pupils: Pupils are equal, round, and reactive to light.   Neck:      Musculoskeletal: Normal range of motion and neck supple.   Cardiovascular:      Rate and Rhythm: Normal rate and regular rhythm.      Pulses: Normal pulses.      Heart sounds: Normal heart sounds.   Pulmonary:      Effort: Pulmonary effort is normal.      Breath sounds: Normal breath sounds.   Abdominal:      General: Bowel sounds are normal.      Palpations: Abdomen is soft.   Musculoskeletal:      Thoracic back: He exhibits decreased range of motion, tenderness, bony tenderness, swelling, deformity, laceration, pain, spasm and abnormal pulse. He exhibits no edema.   Neurological:      Mental Status: He is alert.     A/P:  Patient is a 60 y/o man with neuropathic pain.  He is s/p several spine surgeries.  The predominant component of his pain is a radicular neuropathic component on the right.  Neuromodulation may be a viable option to treat the radicular component and improve his function.  Will attempt to schedule a SCS trial to determine the  efficacy of a permanent implant.    Again, thank you for allowing me to participate in the care of your patient.      Sincerely,    Cristo Lezama MD

## 2019-12-13 NOTE — PATIENT INSTRUCTIONS
Treatment planning:    We recommend that you touch base with your [facility] to assist you in locating a provider who is able to conduct a Neuropsychology evaluation.     Please have the facility fax us the Evaluation-     Sax-[phone]    Recommended Follow up:    As indicated, per evaluation.     To speak with a nurse, schedule/reschedule/cancel a clinic appointment, or request a medication refill call: [phone]     You can also reach us by PASSNFLY: [email or web address]

## 2019-12-14 ASSESSMENT — ANXIETY QUESTIONNAIRES: GAD7 TOTAL SCORE: 8

## 2019-12-14 ASSESSMENT — PATIENT HEALTH QUESTIONNAIRE - PHQ9: SUM OF ALL RESPONSES TO PHQ QUESTIONS 1-9: 9

## 2020-01-07 ENCOUNTER — TELEPHONE (OUTPATIENT)
Dept: ANESTHESIOLOGY | Facility: CLINIC | Age: 60
End: 2020-01-07

## 2020-01-07 DIAGNOSIS — M54.16 LUMBAR RADICULOPATHY: Primary | ICD-10-CM

## 2020-01-07 NOTE — TELEPHONE ENCOUNTER
Pt called RN[facility] to report that he has been unable to find provider to do SCS neuropsychology evaluation and is requesting internal referral to have it done here at [facility].  Internal referral placed.  Scheduling information sent to pt via My Chart.     Rosita Weaver RN, BSN

## 2020-01-13 NOTE — PROGRESS NOTES
"The patient is a 60 y/o man with a chief complaint of mid thoracic spine pain.  He has had multiple spine surgeries, the most recent of which was March 2018 (spinal fusion).  He has back pain with a radicular component.  The pain radiates to the right.  The pain is 5-6/10 in severity.  The pain is said to be constant.  It is described as sharp steady and aching.  The patient reports having a \"nerve block\" in [facility] which provided temporary relief.  He has not identified any other alleviating factors.  The pain is exacerbated by activity.  Aside from the nerve, he has tried gabapentin with moderate relief.  He presents for evaluation for possible neuromodulation.  Current Outpatient Medications   Medication     BusPIRone HCl (BUSPAR PO)     Escitalopram Oxalate (LEXAPRO PO)     gabapentin (NEURONTIN) 800 MG tablet     HYDROmorphone HCl (DILAUDID PO)     OMEPRAZOLE PO     promethazine (PHENERGAN) 25 MG tablet     senna-docusate (SENOKOT-S/PERICOLACE) 8.6-50 MG tablet     senna-docusate (SENOKOT-S;PERICOLACE) 8.6-50 MG per tablet     tiZANidine (ZANAFLEX) 4 MG tablet     TRAZODONE HCL PO     VITAMIN D, CHOLECALCIFEROL, PO     Zolpidem Tartrate (AMBIEN CR PO)     No current facility-administered medications for this visit.      Allergies   Allergen Reactions     Diagn[employer] Nausea and Vomiting     Patient vomits every time he has the contrast. Has tried medication for nausea and it did not work     Silver Nitrate Rash     Past Medical History:   Diagnosis Date     Anxiety      Chronic back pain      CKD (chronic kidney disease)      Class 3 severe obesity due to excess calories with serious comorbidity and body mass index (BMI) of 40.0 to 44.9 in adult (H) 10/12/2018     Depression      GERD (gastroesophageal reflux disease)      Lumbar radiculopathy 10/12/2018     Prediabetes      Past Surgical History:   Procedure Laterality Date     BACK SURGERY  2015    laminectomy     BACK SURGERY      " fixation kyphoplasty lumbar spine     BACK SURGERY      spinal fusion     COLONOSCOPY       EPIDIDYMECTOMY       OPTICAL TRACKING SYSTEM FUSION POSTERIOR SPINE THORACIC THREE+ LEVELS N/A 6/10/2019    Procedure: Revision Thoracic 10-Pelvis Posterior Instrumented Fusion With O-Arm.Stealth, Lumbar 4 Pedicle Subtraction Osteotomy, Removal Of Prior Instrumentation;  Surgeon: Salazar Sebastian MD;  Location: [facility] OR     Family History   Problem Relation Age of Onset     Pancreatic Cancer Father      Bladder Cancer Brother      Anesthesia Reaction Daughter         PONV     Deep Vein Thrombosis (DVT) No family hx of      Social History     Socioeconomic History     Marital status:      Spouse name: Not on file     Number of children: Not on file     Years of education: Not on file     Highest education level: Not on file   Occupational History     Not on file   Social Needs     Financial resource strain: Not on file     Food insecurity:     Worry: Not on file     Inability: Not on file     Transportation needs:     Medical: Not on file     Non-medical: Not on file   Tobacco Use     Smoking status: Former Smoker     Packs/day: 1.00     Years: 40.00     Pack years: 40.00     Last attempt to quit: 2019     Years since quittin.9     Smokeless tobacco: Never Used     Tobacco comment: Quit 2019   Substance and Sexual Activity     Alcohol use: Yes     Binge frequency: Less than monthly     Drug use: No     Sexual activity: Not on file   Lifestyle     Physical activity:     Days per week: Not on file     Minutes per session: Not on file     Stress: Not on file   Relationships     Social connections:     Talks on phone: Not on file     Gets together: Not on file     Attends Muslim service: Not on file     Active member of club or organization: Not on file     Attends meetings of clubs or organizations: Not on file     Relationship status: Not on file     Intimate partner violence:     Fear of  current or ex partner: Not on file     Emotionally abused: Not on file     Physically abused: Not on file     Forced sexual activity: Not on file   Other Topics Concern     Not on file   Social History Narrative     Not on file      ROS: 10 point ROS neg other than the symptoms noted above in the HPI.  Physical Exam  Vitals signs and nursing note reviewed.   Constitutional:       Appearance: Normal appearance.   HENT:      Head: Normocephalic and atraumatic.      Nose: Nose normal.      Mouth/Throat:      Mouth: Mucous membranes are moist.   Eyes:      Extraocular Movements: Extraocular movements intact.      Pupils: Pupils are equal, round, and reactive to light.   Neck:      Musculoskeletal: Normal range of motion and neck supple.   Cardiovascular:      Rate and Rhythm: Normal rate and regular rhythm.      Pulses: Normal pulses.      Heart sounds: Normal heart sounds.   Pulmonary:      Effort: Pulmonary effort is normal.      Breath sounds: Normal breath sounds.   Abdominal:      General: Bowel sounds are normal.      Palpations: Abdomen is soft.   Musculoskeletal:      Thoracic back: He exhibits decreased range of motion, tenderness, bony tenderness, swelling, deformity, laceration, pain, spasm and abnormal pulse. He exhibits no edema.   Neurological:      Mental Status: He is alert.     A/P:  Patient is a 58 y/o man with neuropathic pain.  He is s/p several spine surgeries.  The predominant component of his pain is a radicular neuropathic component on the right.  Neuromodulation may be a viable option to treat the radicular component and improve his function.  Will attempt to schedule a SCS trial to determine the efficacy of a permanent implant.    Answers for HPI/ROS submitted by the patient on 12/13/2019   MELANY 7 TOTAL SCORE: 8  If you checked off any problems, how difficult have these problems made it for you to do your work, take care of things at home, or get along with other people?: Very difficult  PHQ9  TOTAL SCORE: 9  General Symptoms: Yes  Skin Symptoms: No  HENT Symptoms: No  EYE SYMPTOMS: No  HEART SYMPTOMS: No  LUNG SYMPTOMS: No  INTESTINAL SYMPTOMS: Yes  URINARY SYMPTOMS: No  REPRODUCTIVE SYMPTOMS: No  SKELETAL SYMPTOMS: Yes  BLOOD SYMPTOMS: No  NERVOUS SYSTEM SYMPTOMS: No  MENTAL HEALTH SYMPTOMS: Yes  Fever: No  Loss of appetite: No  Weight loss: No  Weight gain: Yes  Fatigue: Yes  Night sweats: No  Chills: No  Increased stress: Yes  Excessive hunger: No  Excessive thirst: No  Feeling hot or cold when others believe the temperature is normal: Yes  Loss of height: No  Post-operative complications: No  Surgical site pain: No  Hallucinations: No  Change in or Loss of Energy: No  Hyperactivity: No  Confusion: No  Heart burn or indigestion: No  Nausea: No  Vomiting: No  Abdominal pain: No  Bloating: No  Constipation: Yes  Diarrhea: Yes  Blood in stool: No  Black stools: No  Rectal or Anal pain: No  Fecal incontinence: No  Yellowing of skin or eyes: No  Vomit with blood: No  Change in stools: No  Back pain: Yes  Muscle aches: Yes  Neck pain: No  Swollen joints: No  Joint pain: No  Bone pain: No  Muscle cramps: Yes  Muscle weakness: Yes  Joint stiffness: Yes  Bone fracture: No  Nervous or Anxious: Yes  Depression: Yes  Trouble sleeping: No  Trouble thinking or concentrating: Yes  Mood changes: Yes  Panic attacks: No

## 2020-01-22 ENCOUNTER — TELEPHONE (OUTPATIENT)
Dept: ANESTHESIOLOGY | Facility: CLINIC | Age: 60
End: 2020-01-22

## 2020-01-22 NOTE — TELEPHONE ENCOUNTER
[facility] Call Center    Phone Message    May a detailed message be left on voicemail: yes    Reason for Call: Other: Please call Maurice at [phone] to see if pt should see a [facility] or a [facility] social. Thank you.     Action Taken: Message routed to:  [facility] ([facility] Pain

## 2020-01-28 NOTE — TELEPHONE ENCOUNTER
RN[facility] called back Maurice regarding neuropsychology evaluation.  She was advised that the evaluation is driven primarily by insurance requirements, and pt's are given MMPI and then meet with psychologist for review of results and evaluation for any psychological barriers that would impact success of the treatment.  I advised that she call back with any questions and also provided the fax number to have the report sent to.     Rosita Weaver RN, BSN

## 2020-02-16 ENCOUNTER — HEALTH MAINTENANCE LETTER (OUTPATIENT)
Age: 60
End: 2020-02-16

## 2020-03-03 ENCOUNTER — TELEPHONE (OUTPATIENT)
Dept: ANESTHESIOLOGY | Facility: CLINIC | Age: 60
End: 2020-03-03

## 2020-03-03 NOTE — TELEPHONE ENCOUNTER
Message received from pt requesting fax number.  RNCC called back pt and left VM with number and also sent via my chart.     Rosita Weaver RN, BSN

## 2020-03-04 ENCOUNTER — TRANSFERRED RECORDS (OUTPATIENT)
Dept: HEALTH INFORMATION MANAGEMENT | Facility: CLINIC | Age: 60
End: 2020-03-04

## 2020-03-26 ENCOUNTER — TELEPHONE (OUTPATIENT)
Dept: ANESTHESIOLOGY | Facility: CLINIC | Age: 60
End: 2020-03-26

## 2020-04-02 NOTE — TELEPHONE ENCOUNTER
Voice message left for pt.  Pt advised that clinic has not received SCS evaluation.  Fax number left, and I requested that pt put attention to GHADA Becker.  Pt left my direct call back number for questions.     Rosita Weaver RN, BSN

## 2020-04-24 ENCOUNTER — TELEPHONE (OUTPATIENT)
Dept: ANESTHESIOLOGY | Facility: CLINIC | Age: 60
End: 2020-04-24

## 2020-05-12 NOTE — TELEPHONE ENCOUNTER
RN[facility] confirmed that SCS neuropsychology evaluation has been received.  Dr. Lezama provided copy to review and places orders for scs trial if appropriate.  Pt updated via My Chart.     Rosita Weaver, RN, BSN

## 2020-06-17 DIAGNOSIS — M96.1 POSTLAMINECTOMY SYNDROME: Primary | ICD-10-CM

## 2020-06-17 RX ORDER — LIDOCAINE 40 MG/G
CREAM TOPICAL
Status: CANCELLED | OUTPATIENT
Start: 2020-06-17

## 2020-06-30 ENCOUNTER — MYC MEDICAL ADVICE (OUTPATIENT)
Dept: ANESTHESIOLOGY | Facility: CLINIC | Age: 60
End: 2020-06-30

## 2020-07-07 NOTE — TELEPHONE ENCOUNTER
[facility] called pt to discuss PA denial for SCS trial. Pt advised that Dr. Lezama has been updated and will be reviewing denial letter to determine approach for appeal. Pt verbalized understanding and declined questions.     BECCA SheehanN, RN

## 2020-07-10 ENCOUNTER — MYC MEDICAL ADVICE (OUTPATIENT)
Dept: ANESTHESIOLOGY | Facility: CLINIC | Age: 60
End: 2020-07-10

## 2020-07-10 NOTE — LETTER
July 17, 2020        To Whom It May Concern:      We have received the prior authorization denial for John Olvera's spinal cord stimulator trial. We are working on compiling information for an appeal, and we are kindly requesting an extension beyond the 30 days allotted for appeal from the date listed on the denial letter (6/23/20). We are requesting a 15 day extension and expect to have additional information submitted by 8/6/20.    Please contact the clinic with any questions.       Sincerely,      Cristo Lezama MD

## 2020-07-22 NOTE — TELEPHONE ENCOUNTER
Voice message left for pt with update regarding PA appeal to work comp for SCS trial. Pt asked to call [facility] back to discuss plan and information that will be submitted tomorrow 8/6/20.    BECCA SheehanN, RN

## 2020-08-27 NOTE — TELEPHONE ENCOUNTER
[facility] called pt to discuss WC PA appeal denial. Pt advised that personal insurance may be an option for coverage of therapy. Pt declined at this time and will contact clinic with any future needs.     BECCA SheehanN, RN

## 2020-11-22 ENCOUNTER — HEALTH MAINTENANCE LETTER (OUTPATIENT)
Age: 60
End: 2020-11-22

## 2021-04-04 ENCOUNTER — HEALTH MAINTENANCE LETTER (OUTPATIENT)
Age: 61
End: 2021-04-04

## 2021-09-18 ENCOUNTER — HEALTH MAINTENANCE LETTER (OUTPATIENT)
Age: 61
End: 2021-09-18

## 2022-04-30 ENCOUNTER — HEALTH MAINTENANCE LETTER (OUTPATIENT)
Age: 62
End: 2022-04-30

## 2022-11-20 ENCOUNTER — HEALTH MAINTENANCE LETTER (OUTPATIENT)
Age: 62
End: 2022-11-20

## 2023-06-02 ENCOUNTER — HEALTH MAINTENANCE LETTER (OUTPATIENT)
Age: 63
End: 2023-06-02

## 2024-05-17 NOTE — TELEPHONE ENCOUNTER
Patient is scheduled for surgery with Dr. Sebastian and Dr. Orellana     Spoke or left message with: patient via phone call     Date of Surgery: 6/10/19     Location: [place]     Informed patient they will need an adult  yes    Post-ops made 6 wks with provider     Pre-op with surgeon (if applicable): yes     H&P: Scheduled with PAC 5/28/19    Additional imaging/appointments: n/a     Surgery packet: will be given in clinic 5/28/19      Additional comments: WC approval per E-mail from Misa    absent

## (undated) DEVICE — WIPES FOLEY CARE SURESTEP PROVON DFC100

## (undated) DEVICE — DRAPE SLEEVE 599

## (undated) DEVICE — DRSG TEGADERM 4X4 3/4" 1626W

## (undated) DEVICE — KIT PATIENT CARE PROAXIS SYSTEM 6988-PV-ACP

## (undated) DEVICE — GLOVE PROTEXIS BLUE W/NEU-THERA 8.0  2D73EB80

## (undated) DEVICE — STPL SKIN PROXIMATE 35 WIDE PMW35

## (undated) DEVICE — DRAPE POUCH INSTRUMENT 1018

## (undated) DEVICE — DRAPE STERI TOWEL SM 1000

## (undated) DEVICE — SPONGE COTTONOID 1X1" 80-1403

## (undated) DEVICE — PREP CHLORAPREP 26ML TINTED ORANGE  260815

## (undated) DEVICE — SU ETHILON 3-0 PS-1 18" 1663H

## (undated) DEVICE — ESU CORD BIPOLAR GREEN 10-4000

## (undated) DEVICE — MIDAS REX DISSECTING TOOL  MC30

## (undated) DEVICE — SYR 10ML LL W/O NDL 302995

## (undated) DEVICE — DRAPE O ARM TUBE 9732722

## (undated) DEVICE — SOL ISOPROPYL RUBBING ALCOHOL USP 70% 4OZ HDX-20 I0020

## (undated) DEVICE — GLOVE PROTEXIS BLUE W/NEU-THERA 8.5  2D73EB85

## (undated) DEVICE — GOWN IMPERVIOUS SPECIALTY XLG/XLONG 32474

## (undated) DEVICE — BLADE BONE MILL STRK 5.0MM MED 5400-701-000

## (undated) DEVICE — ESU GROUND PAD UNIVERSAL W/O CORD

## (undated) DEVICE — GOWN IMPERVIOUS ZONED XLG 9041

## (undated) DEVICE — PACK SET-UP STD 9102

## (undated) DEVICE — IOM MONITORING FIRST 7 HOURS ---- 15 MIN

## (undated) DEVICE — SU VICRYL 1 CT-1 36" J347H

## (undated) DEVICE — CELL SAVER

## (undated) DEVICE — SYR 05ML LL W/O NDL

## (undated) DEVICE — CATH TRAY FOLEY SURESTEP 16FR WDRAIN BAG STLK LATEX A300316A

## (undated) DEVICE — DRAIN JACKSON PRATT ROUND W/TROCAR 15FR LF JP-HUR151

## (undated) DEVICE — SPONGE SURGIFOAM 100 1974

## (undated) DEVICE — SPONGE SURGIFOAM 01GM POWDER 1978

## (undated) DEVICE — MIDAS REX DISSECTING TOOL 4MM BALL 140MM 14BA40

## (undated) DEVICE — TUBING SUCTION MEDI-VAC SOFT 3/16"X20' N520A

## (undated) DEVICE — NDL 18GA 1.5" 305196

## (undated) DEVICE — SUCTION TIP YANKAUER STR K87

## (undated) DEVICE — DECANTER TRANSFER DEVICE 2008S

## (undated) DEVICE — LINEN BACK PACK 5440

## (undated) DEVICE — BONE WAX 2.5GM W31G

## (undated) DEVICE — ESU ELEC BLADE 2.75" COATED/INSULATED E1455

## (undated) DEVICE — SOL WATER IRRIG 1000ML BOTTLE 2F7114

## (undated) DEVICE — SU MONOCRYL 2-0 SH 27" UND Y417H

## (undated) DEVICE — MARKER SPHERES PASSIVE MEDT PACK 5 8801075

## (undated) DEVICE — KIT CULTURE TRANSPORT SYS A.C.T. II DUAL ANEROBE R124022

## (undated) DEVICE — GLOVE PROTEXIS W/NEU-THERA 7.5  2D73TE75

## (undated) DEVICE — DRSG AQUACEL AG 3.5X13.75" HYDROFIBER 412012

## (undated) DEVICE — MIDAS REX DIFFUSER LUBRICANT LEGEND PA100-A

## (undated) DEVICE — SU MONOCRYL 3-0 PS-2 18" UND Y497G

## (undated) DEVICE — POSITIONER ARMBOARD FOAM 1PAIR LF FP-ARMB1

## (undated) DEVICE — DRAPE IOBAN INCISE 23X17" 6650EZ

## (undated) DEVICE — DRAPE MAYO STAND 23X54 8337

## (undated) DEVICE — GLOVE PROTEXIS W/NEU-THERA 8.5  2D73TE85

## (undated) DEVICE — DRSG DRAIN 4X4" 7086

## (undated) DEVICE — DRAIN JACKSON PRATT RESERVOIR 100ML SU130-1305

## (undated) DEVICE — SU VICRYL 0 CT-1 CR 8X18" J740D

## (undated) DEVICE — Device

## (undated) DEVICE — IOM MONITORING ----- 15 MIN

## (undated) DEVICE — BRUSH SURGICAL SCRUB W/4% CHLORHEXIDINE GLUCONATE SOL 4458A

## (undated) DEVICE — SPONGE RAY-TEC 4X8" 7318

## (undated) DEVICE — BASIN SET MAJOR

## (undated) DEVICE — NDL SPINAL 18GA 3.5" 405184

## (undated) DEVICE — GLOVE PROTEXIS W/NEU-THERA 6.5  2D73TE65

## (undated) DEVICE — SPONGE COTTONOID 1/2X1/2" 80-1400

## (undated) DEVICE — ESU PENCIL W/HOLSTER E2350H

## (undated) DEVICE — NDL ANGIOCATH 14GA 1.25" 4048

## (undated) DEVICE — SPECIMEN CONTAINER 5OZ STERILE 2600SA

## (undated) DEVICE — DRAIN HEMOVAC RESERVOIR KIT 10FR 1/8" MED 00-2550-002-10

## (undated) DEVICE — SPONGE COTTONOID 1X3" 80-1408

## (undated) DEVICE — GLOVE PROTEXIS MICRO 8.0  2D73PM80

## (undated) DEVICE — IOM SUPPLIES

## (undated) DEVICE — SU VICRYL 2-0 CT-2 8X18" UND D8144

## (undated) DEVICE — LINEN TOWEL PACK X30 5481

## (undated) RX ORDER — FENTANYL CITRATE 50 UG/ML
INJECTION, SOLUTION INTRAMUSCULAR; INTRAVENOUS
Status: DISPENSED
Start: 2019-06-10

## (undated) RX ORDER — HYDROMORPHONE HYDROCHLORIDE 2 MG/1
TABLET ORAL
Status: DISPENSED
Start: 2019-06-10

## (undated) RX ORDER — CEFAZOLIN SODIUM IN 0.9 % NACL 3 G/100 ML
INTRAVENOUS SOLUTION, PIGGYBACK (ML) INTRAVENOUS
Status: DISPENSED
Start: 2019-06-10

## (undated) RX ORDER — SODIUM CHLORIDE, SODIUM LACTATE, POTASSIUM CHLORIDE, CALCIUM CHLORIDE 600; 310; 30; 20 MG/100ML; MG/100ML; MG/100ML; MG/100ML
INJECTION, SOLUTION INTRAVENOUS
Status: DISPENSED
Start: 2019-06-10

## (undated) RX ORDER — SODIUM BICARBONATE 42 MG/ML
INJECTION, SOLUTION INTRAVENOUS
Status: DISPENSED
Start: 2019-06-10

## (undated) RX ORDER — CEFAZOLIN SODIUM 1 G/3ML
INJECTION, POWDER, FOR SOLUTION INTRAMUSCULAR; INTRAVENOUS
Status: DISPENSED
Start: 2019-06-10

## (undated) RX ORDER — ACETAMINOPHEN 325 MG/1
TABLET ORAL
Status: DISPENSED
Start: 2019-06-10

## (undated) RX ORDER — DEXAMETHASONE SODIUM PHOSPHATE 4 MG/ML
INJECTION, SOLUTION INTRA-ARTICULAR; INTRALESIONAL; INTRAMUSCULAR; INTRAVENOUS; SOFT TISSUE
Status: DISPENSED
Start: 2019-06-10

## (undated) RX ORDER — ONDANSETRON 2 MG/ML
INJECTION INTRAMUSCULAR; INTRAVENOUS
Status: DISPENSED
Start: 2019-06-10

## (undated) RX ORDER — ALBUTEROL SULFATE 0.83 MG/ML
SOLUTION RESPIRATORY (INHALATION)
Status: DISPENSED
Start: 2019-06-10

## (undated) RX ORDER — ALBUMIN, HUMAN INJ 5% 5 %
SOLUTION INTRAVENOUS
Status: DISPENSED
Start: 2019-06-10

## (undated) RX ORDER — PHENYLEPHRINE HCL IN 0.9% NACL 1 MG/10 ML
SYRINGE (ML) INTRAVENOUS
Status: DISPENSED
Start: 2019-06-10

## (undated) RX ORDER — PROPOFOL 10 MG/ML
INJECTION, EMULSION INTRAVENOUS
Status: DISPENSED
Start: 2019-06-10

## (undated) RX ORDER — GABAPENTIN 300 MG/1
CAPSULE ORAL
Status: DISPENSED
Start: 2019-06-10

## (undated) RX ORDER — VANCOMYCIN HYDROCHLORIDE 1 G/20ML
INJECTION, POWDER, LYOPHILIZED, FOR SOLUTION INTRAVENOUS
Status: DISPENSED
Start: 2019-06-10

## (undated) RX ORDER — HYDROMORPHONE HYDROCHLORIDE 1 MG/ML
INJECTION, SOLUTION INTRAMUSCULAR; INTRAVENOUS; SUBCUTANEOUS
Status: DISPENSED
Start: 2019-06-10

## (undated) RX ORDER — EPHEDRINE SULFATE 50 MG/ML
INJECTION, SOLUTION INTRAMUSCULAR; INTRAVENOUS; SUBCUTANEOUS
Status: DISPENSED
Start: 2019-06-10

## (undated) RX ORDER — CALCIUM CHLORIDE 100 MG/ML
INJECTION INTRAVENOUS; INTRAVENTRICULAR
Status: DISPENSED
Start: 2019-06-10

## (undated) RX ORDER — LIDOCAINE HYDROCHLORIDE 20 MG/ML
INJECTION, SOLUTION EPIDURAL; INFILTRATION; INTRACAUDAL; PERINEURAL
Status: DISPENSED
Start: 2019-06-10